# Patient Record
Sex: FEMALE | Race: WHITE | NOT HISPANIC OR LATINO | Employment: FULL TIME | URBAN - METROPOLITAN AREA
[De-identification: names, ages, dates, MRNs, and addresses within clinical notes are randomized per-mention and may not be internally consistent; named-entity substitution may affect disease eponyms.]

---

## 2017-11-12 ENCOUNTER — APPOINTMENT (EMERGENCY)
Dept: CT IMAGING | Facility: HOSPITAL | Age: 52
End: 2017-11-12
Payer: COMMERCIAL

## 2017-11-12 ENCOUNTER — HOSPITAL ENCOUNTER (EMERGENCY)
Facility: HOSPITAL | Age: 52
Discharge: HOME | End: 2017-11-12
Attending: EMERGENCY MEDICINE | Admitting: EMERGENCY MEDICINE
Payer: COMMERCIAL

## 2017-11-12 VITALS
BODY MASS INDEX: 26.34 KG/M2 | HEART RATE: 84 BPM | WEIGHT: 158.07 LBS | RESPIRATION RATE: 17 BRPM | DIASTOLIC BLOOD PRESSURE: 76 MMHG | SYSTOLIC BLOOD PRESSURE: 143 MMHG | HEIGHT: 65 IN | TEMPERATURE: 98.8 F | OXYGEN SATURATION: 100 %

## 2017-11-12 DIAGNOSIS — R42 DIZZINESS: Primary | ICD-10-CM

## 2017-11-12 PROCEDURE — 99284 EMERGENCY DEPT VISIT MOD MDM: CPT

## 2017-11-12 PROCEDURE — 93005 ELECTROCARDIOGRAM TRACING: CPT | Performed by: EMERGENCY MEDICINE

## 2017-11-12 PROCEDURE — 70450 CT HEAD/BRAIN W/O DYE: CPT

## 2017-11-12 RX ORDER — METRONIDAZOLE 500 MG/1
500 TABLET ORAL 2 TIMES DAILY
COMMUNITY
Start: 2017-11-09 | End: 2017-11-16

## 2017-11-12 NOTE — DISCHARGE INSTRUCTIONS
Dizziness   WHAT YOU NEED TO KNOW:   What is dizziness? Dizziness is a feeling of being off balance or unsteady  Common causes of dizziness are an inner ear fluid imbalance or a lack of oxygen in your blood  Dizziness may be acute (lasts 3 days or less) or chronic (lasts longer than 3 days)  You may have dizzy spells that last from seconds to a few hours  What increases my risk for dizziness? Dizziness may get worse during certain activities or when you move a certain way  The following may also increase your risk for dizziness:  · Older age    · An infection, ear surgery, or an inner ear condition, such as Ménière disease    · Stroke, a brain tumor, or a recent head trauma     · An injury that causes a large amount of blood loss    · Heart or blood pressure problems     · Exposure to chemicals, or long-term alcohol use     · Medicines used to treat high blood pressure, seizure disorders, or anxiety and depression     · A nerve disorder, such as multiple sclerosis  What signs and symptoms may happen with dizziness? · A feeling that your surroundings are moving even though you are standing still    · Ringing in your ears or hearing loss     · Feeling faint or lightheaded     · Weakness or unsteadiness     · Double vision or eye movements you cannot control    · Nausea or vomiting     · Confusion  How is the cause of dizziness diagnosed? Your healthcare provider may ask when the dizziness started  Tell the provider if you have dizzy spells, and how long they last  Tell him or her what happens before you become dizzy  The provider will ask if you have other health conditions and if you take any medicines  He or she will check your blood pressure and pulse to see if your dizziness may be related to your heart  Your balance, strength, reflexes, and the way you walk may also be checked   You may need any of the following tests to help find the cause of your dizziness:  · An EKG  records the electrical activity of your heart  An EKG can be used to check for an abnormal heart beat or heart damage  · Blood tests  will check your blood sugar level, infection, and your blood cell levels  · CT or MRI  pictures check for a stroke, head injury, or brain tumor  They also check for brain bleeding or swelling  You may be given contrast liquid to help your brain show up better in the pictures  Tell the healthcare provider if you have ever had an allergic reaction to contrast liquid  Do not enter the MRI room with anything metal  Metal can cause serious injury  Tell the healthcare provider if you have any metal in or on your body  How is dizziness treated? Treatment will depend on the cause of your dizziness  Your healthcare provider may give you oxygen or medicines to decrease your dizziness and nausea  He may also refer you to a specialist  Reanna Bush may need to be admitted to the hospital for treatment  How can I manage my symptoms? · Do not drive  or operate heavy machinery when you are dizzy  · Get up slowly  from sitting or lying down  · Drink plenty of liquids  Liquids help prevent dehydration  Ask how much liquid to drink each day and which liquids are best for you  When should I seek immediate care? · You have a headache and a stiff neck  · You have shaking chills and a fever  · You vomit over and over with no relief  · Your vomit or bowel movements are red or black  · You have pain in your chest, back, or abdomen  · You have numbness, especially in your face, arms, or legs  · You have trouble moving your arms or legs  · You are confused  When should I contact my healthcare provider? · You have a fever  · Your symptoms do not get better with treatment  · You have questions or concerns about your condition or care  CARE AGREEMENT:   You have the right to help plan your care  Learn about your health condition and how it may be treated   Discuss treatment options with your caregivers to decide what care you want to receive  You always have the right to refuse treatment  The above information is an  only  It is not intended as medical advice for individual conditions or treatments  Talk to your doctor, nurse or pharmacist before following any medical regimen to see if it is safe and effective for you  © 2017 [employer] [employer] Information is for End User's use only and may not be sold, redistributed or otherwise used for commercial purposes  All illustrations and images included in CareNotes® are the copyrighted property of [employer]  or Randy Shaunna  Dizziness   WHAT YOU NEED TO KNOW:   Dizziness is a feeling of being off balance or unsteady  Common causes of dizziness are an inner ear fluid imbalance or a lack of oxygen in your blood  Dizziness may be acute (lasts 3 days or less) or chronic (lasts longer than 3 days)  You may have dizzy spells that last from seconds to a few hours  DISCHARGE INSTRUCTIONS:   Return to the emergency department if:   · You have a headache and a stiff neck  · You have shaking chills and a fever  · You vomit over and over with no relief  · Your vomit or bowel movements are red or black  · You have pain in your chest, back, or abdomen  · You have numbness, especially in your face, arms, or legs  · You have trouble moving your arms or legs  · You are confused  Contact your healthcare provider if:   · You have a fever  · Your symptoms do not get better with treatment  · You have questions or concerns about your condition or care  Manage your symptoms:   · Do not drive  or operate heavy machinery when you are dizzy  · Get up slowly  from sitting or lying down  · Drink plenty of liquids  Liquids help prevent dehydration  Ask how much liquid to drink each day and which liquids are best for you    Follow up with your healthcare provider as directed:  Write down your questions so you remember to ask them during your visits  © 2017 [employer] [employer] Information is for End User's use only and may not be sold, redistributed or otherwise used for commercial purposes  All illustrations and images included in CareNotes® are the copyrighted property of [employer]  or Randy Maza  The above information is an  only  It is not intended as medical advice for individual conditions or treatments  Talk to your doctor, nurse or pharmacist before following any medical regimen to see if it is safe and effective for you

## 2017-11-13 LAB
ATRIAL RATE: 71 BPM
P AXIS: 72 DEGREES
PR INTERVAL: 166 MS
QRS AXIS: 6 DEGREES
QRSD INTERVAL: 96 MS
QT INTERVAL: 402 MS
QTC INTERVAL: 436 MS
T WAVE AXIS: 68 DEGREES
VENTRICULAR RATE: 71 BPM

## 2018-11-03 ENCOUNTER — HOSPITAL ENCOUNTER (EMERGENCY)
Facility: HOSPITAL | Age: 53
Discharge: HOME | End: 2018-11-03
Attending: EMERGENCY MEDICINE | Admitting: EMERGENCY MEDICINE
Payer: COMMERCIAL

## 2018-11-03 ENCOUNTER — APPOINTMENT (EMERGENCY)
Dept: CT IMAGING | Facility: HOSPITAL | Age: 53
End: 2018-11-03
Payer: COMMERCIAL

## 2018-11-03 VITALS
DIASTOLIC BLOOD PRESSURE: 82 MMHG | HEIGHT: 65 IN | RESPIRATION RATE: 16 BRPM | OXYGEN SATURATION: 98 % | SYSTOLIC BLOOD PRESSURE: 122 MMHG | HEART RATE: 87 BPM | BODY MASS INDEX: 25.49 KG/M2 | TEMPERATURE: 98.1 F | WEIGHT: 153 LBS

## 2018-11-03 DIAGNOSIS — R55 NEAR SYNCOPE: Primary | ICD-10-CM

## 2018-11-03 DIAGNOSIS — R42 DIZZINESS: ICD-10-CM

## 2018-11-03 DIAGNOSIS — I10 HYPERTENSION: ICD-10-CM

## 2018-11-03 LAB
ALBUMIN SERPL BCP-MCNC: 4.4 G/DL (ref 3.5–5.7)
ALP SERPL-CCNC: 40 U/L (ref 40–150)
ALT SERPL W P-5'-P-CCNC: 15 U/L (ref 7–52)
ANION GAP SERPL CALCULATED.3IONS-SCNC: 9 MMOL/L (ref 4–13)
APTT PPP: 32 SECONDS (ref 24–36)
AST SERPL W P-5'-P-CCNC: 14 U/L (ref 13–39)
ATRIAL RATE: 76 BPM
BASOPHILS # BLD AUTO: 0 THOUSANDS/ΜL (ref 0–0.1)
BASOPHILS NFR BLD AUTO: 1 % (ref 0–2)
BILIRUB SERPL-MCNC: 0.4 MG/DL (ref 0.2–1)
BNP SERPL-MCNC: 21 PG/ML (ref 1–100)
BUN SERPL-MCNC: 16 MG/DL (ref 7–25)
CALCIUM SERPL-MCNC: 9.6 MG/DL (ref 8.6–10.5)
CHLORIDE SERPL-SCNC: 105 MMOL/L (ref 98–107)
CO2 SERPL-SCNC: 25 MMOL/L (ref 21–31)
CREAT SERPL-MCNC: 0.71 MG/DL (ref 0.6–1.2)
EOSINOPHIL # BLD AUTO: 0.1 THOUSAND/ΜL (ref 0–0.61)
EOSINOPHIL NFR BLD AUTO: 3 % (ref 0–5)
ERYTHROCYTE [DISTWIDTH] IN BLOOD BY AUTOMATED COUNT: 13.5 % (ref 11.5–14.5)
GFR SERPL CREATININE-BSD FRML MDRD: 100 ML/MIN/1.73SQ M
GLUCOSE SERPL-MCNC: 102 MG/DL (ref 65–99)
HCT VFR BLD AUTO: 42.5 % (ref 34.8–46.1)
HGB BLD-MCNC: 14.2 G/DL (ref 12–16)
INR PPP: 0.94 (ref 0.9–1.5)
LYMPHOCYTES # BLD AUTO: 0.9 THOUSANDS/ΜL (ref 0.6–4.47)
LYMPHOCYTES NFR BLD AUTO: 20 % (ref 21–51)
MCH RBC QN AUTO: 31 PG (ref 26–34)
MCHC RBC AUTO-ENTMCNC: 33.3 G/DL (ref 31–37)
MCV RBC AUTO: 93 FL (ref 81–99)
MONOCYTES # BLD AUTO: 0.4 THOUSAND/ΜL (ref 0.17–1.22)
MONOCYTES NFR BLD AUTO: 8 % (ref 2–12)
NEUTROPHILS # BLD AUTO: 3 THOUSANDS/ΜL (ref 1.4–6.5)
NEUTS SEG NFR BLD AUTO: 68 % (ref 42–75)
NRBC BLD AUTO-RTO: 0 /100 WBCS
P AXIS: 74 DEGREES
PLATELET # BLD AUTO: 224 THOUSANDS/UL (ref 149–390)
PMV BLD AUTO: 8.8 FL (ref 8.6–11.7)
POTASSIUM SERPL-SCNC: 3.9 MMOL/L (ref 3.5–5.5)
PR INTERVAL: 154 MS
PROT SERPL-MCNC: 6.6 G/DL (ref 6.4–8.9)
PROTHROMBIN TIME: 10.9 SECONDS (ref 10.1–12.9)
QRS AXIS: 0 DEGREES
QRSD INTERVAL: 96 MS
QT INTERVAL: 410 MS
QTC INTERVAL: 461 MS
RBC # BLD AUTO: 4.56 MILLION/UL (ref 3.9–5.2)
SODIUM SERPL-SCNC: 139 MMOL/L (ref 134–143)
T WAVE AXIS: 54 DEGREES
TROPONIN I SERPL-MCNC: <0.03 NG/ML
VENTRICULAR RATE: 76 BPM
WBC # BLD AUTO: 4.4 THOUSAND/UL (ref 4.8–10.8)

## 2018-11-03 PROCEDURE — 85610 PROTHROMBIN TIME: CPT | Performed by: EMERGENCY MEDICINE

## 2018-11-03 PROCEDURE — 85730 THROMBOPLASTIN TIME PARTIAL: CPT | Performed by: EMERGENCY MEDICINE

## 2018-11-03 PROCEDURE — 36415 COLL VENOUS BLD VENIPUNCTURE: CPT | Performed by: EMERGENCY MEDICINE

## 2018-11-03 PROCEDURE — 85025 COMPLETE CBC W/AUTO DIFF WBC: CPT | Performed by: EMERGENCY MEDICINE

## 2018-11-03 PROCEDURE — 93005 ELECTROCARDIOGRAM TRACING: CPT

## 2018-11-03 PROCEDURE — 70450 CT HEAD/BRAIN W/O DYE: CPT

## 2018-11-03 PROCEDURE — 84484 ASSAY OF TROPONIN QUANT: CPT | Performed by: EMERGENCY MEDICINE

## 2018-11-03 PROCEDURE — 96360 HYDRATION IV INFUSION INIT: CPT

## 2018-11-03 PROCEDURE — 93010 ELECTROCARDIOGRAM REPORT: CPT | Performed by: INTERNAL MEDICINE

## 2018-11-03 PROCEDURE — 99284 EMERGENCY DEPT VISIT MOD MDM: CPT

## 2018-11-03 PROCEDURE — 83880 ASSAY OF NATRIURETIC PEPTIDE: CPT | Performed by: EMERGENCY MEDICINE

## 2018-11-03 PROCEDURE — 80053 COMPREHEN METABOLIC PANEL: CPT | Performed by: EMERGENCY MEDICINE

## 2018-11-03 RX ADMIN — SODIUM CHLORIDE 1000 ML: 0.9 INJECTION, SOLUTION INTRAVENOUS at 13:12

## 2018-11-03 NOTE — DISCHARGE INSTRUCTIONS
Dizziness   WHAT YOU NEED TO KNOW:   Dizziness is a feeling of being off balance or unsteady  Common causes of dizziness are an inner ear fluid imbalance or a lack of oxygen in your blood  Dizziness may be acute (lasts 3 days or less) or chronic (lasts longer than 3 days)  You may have dizzy spells that last from seconds to a few hours  DISCHARGE INSTRUCTIONS:   Return to the emergency department if:   · You have a headache and a stiff neck  · You have shaking chills and a fever  · You vomit over and over with no relief  · Your vomit or bowel movements are red or black  · You have pain in your chest, back, or abdomen  · You have numbness, especially in your face, arms, or legs  · You have trouble moving your arms or legs  · You are confused  Contact your healthcare provider if:   · You have a fever  · Your symptoms do not get better with treatment  · You have questions or concerns about your condition or care  Manage your symptoms:   · Do not drive  or operate heavy machinery when you are dizzy  · Get up slowly  from sitting or lying down  · Drink plenty of liquids  Liquids help prevent dehydration  Ask how much liquid to drink each day and which liquids are best for you  Follow up with your healthcare provider as directed:  Write down your questions so you remember to ask them during your visits  © 2017 [address] Information is for End User's use only and may not be sold, redistributed or otherwise used for commercial purposes  All illustrations and images included in CareNotes® are the copyrighted property of [employer]  or Randy Maza  The above information is an  only  It is not intended as medical advice for individual conditions or treatments  Talk to your doctor, nurse or pharmacist before following any medical regimen to see if it is safe and effective for you      Lightheadedness   WHAT YOU NEED TO KNOW: Lightheadedness is the feeling that you may faint, but you do not  Your heartbeat may be fast or feel like it flutters  Lightheadedness may occur when you take certain medicines, such as medicine to lower your blood pressure  Dehydration, low sodium, low blood sugar, an abnormal heart rhythm, and anxiety are other common causes  DISCHARGE INSTRUCTIONS:   Return to the emergency department if:   · You have sudden chest pain  · You have trouble breathing or shortness of breath  · You have vision changes, are sweating, and have nausea while you are sitting or lying down  · You feel flushed and your heart is fluttering  · You faint  Contact your healthcare provider if:   · You feel lightheaded often  · Your heart beats faster or slower than usual      · You have questions or concerns about your condition or care  Follow up with your healthcare provider as directed: You may need more tests to help find the cause of your lightheadedness  The tests will help healthcare providers plan the best treatment for you  Write down your questions so you remember to ask them during your visits  Self-care:  Talk with your healthcare provider about these and other ways to manage your symptoms:  · Lie down  when you feel lightheaded, your throat gets tight, or your vision changes  Raise your legs above the level of your heart  · Stand up slowly  Sit on the side of the bed or couch for a few minutes before you stand up  · Take slow, deep breaths when you feel lightheaded  This can help decrease the feeling that you might faint  · Ask if you need to avoid hot baths and saunas  These may make your symptoms worse  Watch for signs of low blood sugar: These include hunger, nervousness, sweating, and fast or fluttery heartbeats  Talk with your healthcare provider about ways to keep your blood sugar level steady    Check your blood pressure often:  You should do this especially if you take medicine to lower your blood pressure  Check your blood pressure when you are lying down and when you are standing  Ask how often to check during the day  Keep a record of your blood pressure numbers  Your healthcare provider may use the record to help plan your treatment  Keep a record of your lightheadedness episodes:  Include your symptoms and your activity before and after the episode  The record can help your healthcare provider find the cause of your lightheadedness and help you manage episodes  © 2017 [address] [facility] Information is for End User's use only and may not be sold, redistributed or otherwise used for commercial purposes  All illustrations and images included in CareNotes® are the copyrighted property of [employer]  or Randy Maza  The above information is an  only  It is not intended as medical advice for individual conditions or treatments  Talk to your doctor, nurse or pharmacist before following any medical regimen to see if it is safe and effective for you

## 2018-11-03 NOTE — ED NOTES
Pt states she still feels uncomfortably  BP sitting 123/62 pulse88   BP standing 121/76 pulse 78      After short walk /80     Sylvester Nava RN  11/03/18 1314

## 2018-11-03 NOTE — ED PROVIDER NOTES
History  Chief Complaint   Patient presents with    Dizziness     on and off for several months     Patient is a 61-year-old female with prior history of getting dizzy and lightheaded with similar symptoms about a year ago and she reports that this was attributed to a medication reaction from Flagyl at the time she presents to the emergency department now complaining of lightheadedness and near syncope with exertion by her description ongoing for last 2 days she denies any chest pain or trouble breathing  Patient denies any numbness weakness or headaches or change in mental status although she does admit to feeling very cloudy in the head  History provided by:  Patient  Dizziness   Quality:  Lightheadedness  Severity:  Moderate  Onset quality:  Gradual  Duration:  2 days  Timing:  Intermittent  Progression:  Waxing and waning  Chronicity:  New  Context: physical activity and standing up    Context: not with head movement    Relieved by:  Lying down  Worsened by:  Standing up and movement  Associated symptoms: no chest pain, no diarrhea, no headaches, no nausea, no palpitations, no shortness of breath, no vomiting and no weakness    Risk factors: no anemia, no heart disease, no hx of stroke, no hx of vertigo, no Meniere's disease, no multiple medications and no new medications        Prior to Admission Medications   Prescriptions Last Dose Informant Patient Reported? Taking? DIPHENHYDRAMINE HCL PO   Yes No   Sig: Take 50 mg by mouth      Facility-Administered Medications: None       History reviewed  No pertinent past medical history  Past Surgical History:   Procedure Laterality Date    HYSTERECTOMY  1997       History reviewed  No pertinent family history  I have reviewed and agree with the history as documented      Social History   Substance Use Topics    Smoking status: Former Smoker     Types: Cigarettes     Quit date: 1/12/2001    Smokeless tobacco: Never Used    Alcohol use No Review of Systems   Constitutional: Negative for activity change, appetite change, chills, fatigue and fever  HENT: Negative for congestion, ear pain, rhinorrhea and sore throat  Eyes: Negative for discharge, redness and visual disturbance  Respiratory: Negative for cough, chest tightness, shortness of breath and wheezing  Cardiovascular: Negative for chest pain and palpitations  Gastrointestinal: Negative for abdominal pain, constipation, diarrhea, nausea and vomiting  Endocrine: Negative for polydipsia and polyuria  Genitourinary: Negative for difficulty urinating, dysuria, frequency, hematuria and urgency  Musculoskeletal: Negative for arthralgias and myalgias  Skin: Negative for color change, pallor and rash  Neurological: Positive for dizziness  Negative for weakness, light-headedness, numbness and headaches  Hematological: Negative for adenopathy  Does not bruise/bleed easily  All other systems reviewed and are negative  Physical Exam  Physical Exam   Constitutional: She is oriented to person, place, and time  She appears well-developed and well-nourished  HENT:   Head: Normocephalic and atraumatic  Right Ear: External ear normal    Left Ear: External ear normal    Nose: Nose normal    Mouth/Throat: Oropharynx is clear and moist    Eyes: Pupils are equal, round, and reactive to light  Conjunctivae and EOM are normal    Neck: Normal range of motion  Neck supple  Cardiovascular: Normal rate, regular rhythm, normal heart sounds and intact distal pulses  Pulmonary/Chest: Effort normal and breath sounds normal  No respiratory distress  She has no wheezes  She has no rales  She exhibits no tenderness  Abdominal: Soft  Bowel sounds are normal  She exhibits no distension  There is no tenderness  There is no guarding  Musculoskeletal: Normal range of motion  Neurological: She is alert and oriented to person, place, and time  No cranial nerve deficit or sensory deficit  Skin: Skin is warm and dry  Psychiatric: She has a normal mood and affect  Nursing note and vitals reviewed  Vital Signs  ED Triage Vitals [11/03/18 1113]   Temperature Pulse Respirations Blood Pressure SpO2   (!) 96 9 °F (36 1 °C) 96 20 (!) 173/77 99 %      Temp Source Heart Rate Source Patient Position - Orthostatic VS BP Location FiO2 (%)   Temporal -- Lying Left arm --      Pain Score       No Pain           Vitals:    11/03/18 1113   BP: (!) 173/77   Pulse: 96   Patient Position - Orthostatic VS: Lying       Visual Acuity  Visual Acuity      Most Recent Value   L Pupil Size (mm)  4   R Pupil Size (mm)  4          ED Medications  Medications   sodium chloride 0 9 % bolus 1,000 mL (not administered)       Diagnostic Studies  Results Reviewed     Procedure Component Value Units Date/Time    B-Type Natriuretic Peptide [facility] ONLY) [[id number]]  (Normal) Collected:  11/03/18 1136    Lab Status:  Final result Specimen:  Blood from Arm, Left Updated:  11/03/18 1221     BNP 21 pg/mL     Comprehensive metabolic panel [[id number]]  (Abnormal) Collected:  11/03/18 1136    Lab Status:  Final result Specimen:  Blood from Arm, Left Updated:  11/03/18 1215     Sodium 139 mmol/L      Potassium 3 9 mmol/L      Chloride 105 mmol/L      CO2 25 mmol/L      ANION GAP 9 mmol/L      BUN 16 mg/dL      Creatinine 0 71 mg/dL      Glucose 102 (H) mg/dL      Calcium 9 6 mg/dL      AST 14 U/L      ALT 15 U/L      Alkaline Phosphatase 40 U/L      Total Protein 6 6 g/dL      Albumin 4 4 g/dL      Total Bilirubin 0 40 mg/dL      eGFR 100 ml/min/1 73sq m     Narrative:         National Kidney Disease Education Program recommendations are as follows:  GFR calculation is accurate only with a steady state creatinine  Chronic Kidney disease less than 60 ml/min/1 73 sq  meters  Kidney failure less than 15 ml/min/1 73 sq  meters      Troponin I [[id number]]  (Normal) Collected:  11/03/18 1136    Lab Status:  Final result Specimen: Blood from Arm, Left Updated:  11/03/18 1215     Troponin I <0 03 ng/mL     Protime-INR [[id number]]  (Normal) Collected:  11/03/18 1136    Lab Status:  Final result Specimen:  Blood from Arm, Left Updated:  11/03/18 1159     Protime 10 9 seconds      INR 0 94    APTT [[id number]]  (Normal) Collected:  11/03/18 1136    Lab Status:  Final result Specimen:  Blood from Arm, Left Updated:  11/03/18 1159     PTT 32 seconds     CBC and differential [[id number]]  (Abnormal) Collected:  11/03/18 1136    Lab Status:  Final result Specimen:  Blood from Arm, Left Updated:  11/03/18 1145     WBC 4 40 (L) Thousand/uL      RBC 4 56 Million/uL      Hemoglobin 14 2 g/dL      Hematocrit 42 5 %      MCV 93 fL      MCH 31 0 pg      MCHC 33 3 g/dL      RDW 13 5 %      MPV 8 8 fL      Platelets 151 Thousands/uL      nRBC 0 /100 WBCs      Neutrophils Relative 68 %      Lymphocytes Relative 20 (L) %      Monocytes Relative 8 %      Eosinophils Relative 3 %      Basophils Relative 1 %      Neutrophils Absolute 3 00 Thousands/µL      Lymphocytes Absolute 0 90 Thousands/µL      Monocytes Absolute 0 40 Thousand/µL      Eosinophils Absolute 0 10 Thousand/µL      Basophils Absolute 0 00 Thousands/µL                  CT head without contrast   Final Result by Beata Pop MD (11/03 1232)      1  There are no findings of intra or extra-axial hemorrhage, midline   shift or herniation  2   The MRI of the brain would be more sensitive examination and can be   considered for further evaluation with patient history of dizziness           Signed by Beata Pop                 Procedures  ECG 12 Lead Documentation  Date/Time: 11/3/2018 11:35 AM  Performed by: Liz Young  Authorized by: Liz Young     ECG reviewed by me, the ED Provider: yes    Patient location:  ED  Previous ECG:     Comparison to cardiac monitor: Yes    Quality:     Tracing quality:  Limited by artifact  Rate:     ECG rate:  76    ECG rate assessment: normal    Rhythm: Rhythm: sinus rhythm    QRS:     QRS axis:  Left  ST segments:     ST segments:  Non-specific  T waves:     T waves: non-specific             Phone Contacts  ED Phone Contact    ED Course                               MDM  Number of Diagnoses or Management Options  Dizziness: new and requires workup  Hypertension: new and requires workup  Near syncope: new and requires workup  Diagnosis management comments: Patient remained stable in the emergency department felt improved and well after IV fluids administered she states that her blood pressure was normal at her last physical she is advised of elevated blood pressure noted in the emergency department today and recommended follow-up with primary care physician promptly for further evaluation re-evaluation and obtain test results patient is neurologically intact with normal nonfocal neurologic examination in the ED workup in ED is unremarkable at this point she remains clinically stable for discharge in return home and follow up with family doctor return precautions and anticipatory guidance discussed           Amount and/or Complexity of Data Reviewed  Clinical lab tests: ordered and reviewed  Tests in the radiology section of CPT®: ordered and reviewed  Tests in the medicine section of CPT®: ordered and reviewed  Independent visualization of images, tracings, or specimens: yes    Risk of Complications, Morbidity, and/or Mortality  Presenting problems: moderate  Management options: moderate    Patient Progress  Patient progress: stable    CritCare Time    Disposition  Final diagnoses:   Near syncope   Dizziness   Hypertension     Time reflects when diagnosis was documented in both MDM as applicable and the Disposition within this note     Time User Action Codes Description Comment    11/3/2018 12:27 PM Vj Burton Add [R55] Near syncope     11/3/2018 12:27 PM Vj Burton Add [R42] Dizziness     11/3/2018 12:27 PM Vj Burton Add [I10] Hypertension       ED Disposition     ED Disposition Condition Comment    Discharge  Ton Castro discharge to home/self care  Condition at discharge: Stable        Follow-up Information     Follow up With Specialties Details Why Contact Info    Lewis Rene DO Family Medicine Schedule an appointment as soon as possible for a visit in 2 days  [address] [place] [address],[address]  [address]  [facility] [address]  [phone]            Patient's Medications   Discharge Prescriptions    No medications on file     No discharge procedures on file      ED Provider  Electronically Signed by           Mariposa Kay DO  11/03/18 3440

## 2018-11-03 NOTE — ED NOTES
Pt states she is feeling better after the fluids  Pt states she is on a diet and at times doesn't eat enough at times and typically doesn't drink water  Spoke with pt re: dietary needs and supplements  Pt in agreement and verbalizes understanding of the same        Romayne Arnold, RN  11/03/18 4572

## 2019-01-30 ENCOUNTER — APPOINTMENT (EMERGENCY)
Dept: RADIOLOGY | Facility: HOSPITAL | Age: 54
End: 2019-01-30
Payer: COMMERCIAL

## 2019-01-30 ENCOUNTER — APPOINTMENT (EMERGENCY)
Dept: CT IMAGING | Facility: HOSPITAL | Age: 54
End: 2019-01-30
Payer: COMMERCIAL

## 2019-01-30 ENCOUNTER — HOSPITAL ENCOUNTER (EMERGENCY)
Facility: HOSPITAL | Age: 54
Discharge: HOME | End: 2019-01-30
Attending: EMERGENCY MEDICINE | Admitting: EMERGENCY MEDICINE
Payer: COMMERCIAL

## 2019-01-30 VITALS
DIASTOLIC BLOOD PRESSURE: 104 MMHG | HEART RATE: 90 BPM | OXYGEN SATURATION: 100 % | TEMPERATURE: 98.3 F | RESPIRATION RATE: 18 BRPM | SYSTOLIC BLOOD PRESSURE: 139 MMHG

## 2019-01-30 DIAGNOSIS — R42 DIZZINESS: Primary | ICD-10-CM

## 2019-01-30 DIAGNOSIS — J01.90 ACUTE SINUSITIS: ICD-10-CM

## 2019-01-30 LAB
ALBUMIN SERPL BCP-MCNC: 4.2 G/DL (ref 3.5–5)
ALP SERPL-CCNC: 56 U/L (ref 46–116)
ALT SERPL W P-5'-P-CCNC: 29 U/L (ref 12–78)
AMPHETAMINES SERPL QL SCN: NEGATIVE
ANION GAP SERPL CALCULATED.3IONS-SCNC: 12 MMOL/L (ref 4–13)
APTT PPP: 25 SECONDS (ref 26–38)
AST SERPL W P-5'-P-CCNC: 11 U/L (ref 5–45)
BARBITURATES UR QL: NEGATIVE
BASOPHILS # BLD AUTO: 0.06 THOUSANDS/ΜL (ref 0–0.1)
BASOPHILS NFR BLD AUTO: 1 % (ref 0–1)
BENZODIAZ UR QL: NEGATIVE
BILIRUB SERPL-MCNC: 0.4 MG/DL (ref 0.2–1)
BILIRUB UR QL STRIP: NEGATIVE
BUN SERPL-MCNC: 24 MG/DL (ref 5–25)
CALCIUM SERPL-MCNC: 9.4 MG/DL (ref 8.3–10.1)
CHLORIDE SERPL-SCNC: 103 MMOL/L (ref 100–108)
CLARITY UR: CLEAR
CO2 SERPL-SCNC: 27 MMOL/L (ref 21–32)
COCAINE UR QL: NEGATIVE
COLOR UR: YELLOW
CREAT SERPL-MCNC: 0.82 MG/DL (ref 0.6–1.3)
EOSINOPHIL # BLD AUTO: 0.2 THOUSAND/ΜL (ref 0–0.61)
EOSINOPHIL NFR BLD AUTO: 3 % (ref 0–6)
ERYTHROCYTE [DISTWIDTH] IN BLOOD BY AUTOMATED COUNT: 12.8 % (ref 11.6–15.1)
GFR SERPL CREATININE-BSD FRML MDRD: 83 ML/MIN/1.73SQ M
GLUCOSE SERPL-MCNC: 100 MG/DL (ref 65–140)
GLUCOSE UR STRIP-MCNC: NEGATIVE MG/DL
HCT VFR BLD AUTO: 44.3 % (ref 34.8–46.1)
HGB BLD-MCNC: 14.4 G/DL (ref 11.5–15.4)
HGB UR QL STRIP.AUTO: NEGATIVE
IMM GRANULOCYTES # BLD AUTO: 0.02 THOUSAND/UL (ref 0–0.2)
IMM GRANULOCYTES NFR BLD AUTO: 0 % (ref 0–2)
INR PPP: 0.88 (ref 0.86–1.17)
KETONES UR STRIP-MCNC: ABNORMAL MG/DL
LEUKOCYTE ESTERASE UR QL STRIP: NEGATIVE
LYMPHOCYTES # BLD AUTO: 2.1 THOUSANDS/ΜL (ref 0.6–4.47)
LYMPHOCYTES NFR BLD AUTO: 33 % (ref 14–44)
MAGNESIUM SERPL-MCNC: 1.9 MG/DL (ref 1.6–2.6)
MCH RBC QN AUTO: 30.7 PG (ref 26.8–34.3)
MCHC RBC AUTO-ENTMCNC: 32.5 G/DL (ref 31.4–37.4)
MCV RBC AUTO: 95 FL (ref 82–98)
METHADONE UR QL: NEGATIVE
MONOCYTES # BLD AUTO: 0.53 THOUSAND/ΜL (ref 0.17–1.22)
MONOCYTES NFR BLD AUTO: 8 % (ref 4–12)
NEUTROPHILS # BLD AUTO: 3.49 THOUSANDS/ΜL (ref 1.85–7.62)
NEUTS SEG NFR BLD AUTO: 55 % (ref 43–75)
NITRITE UR QL STRIP: NEGATIVE
NRBC BLD AUTO-RTO: 0 /100 WBCS
NT-PROBNP SERPL-MCNC: 23 PG/ML
OPIATES UR QL SCN: NEGATIVE
PCP UR QL: NEGATIVE
PH UR STRIP.AUTO: 5.5 [PH] (ref 4.5–8)
PLATELET # BLD AUTO: 296 THOUSANDS/UL (ref 149–390)
PMV BLD AUTO: 10.4 FL (ref 8.9–12.7)
POTASSIUM SERPL-SCNC: 3.8 MMOL/L (ref 3.5–5.3)
PROT SERPL-MCNC: 7.3 G/DL (ref 6.4–8.2)
PROT UR STRIP-MCNC: NEGATIVE MG/DL
PROTHROMBIN TIME: 11.7 SECONDS (ref 11.8–14.2)
RBC # BLD AUTO: 4.69 MILLION/UL (ref 3.81–5.12)
SODIUM SERPL-SCNC: 142 MMOL/L (ref 136–145)
SP GR UR STRIP.AUTO: <=1.005 (ref 1–1.03)
THC UR QL: NEGATIVE
TROPONIN I SERPL-MCNC: <0.02 NG/ML
TSH SERPL DL<=0.05 MIU/L-ACNC: 2.23 UIU/ML (ref 0.36–3.74)
UROBILINOGEN UR QL STRIP.AUTO: 0.2 E.U./DL
WBC # BLD AUTO: 6.4 THOUSAND/UL (ref 4.31–10.16)

## 2019-01-30 PROCEDURE — 93005 ELECTROCARDIOGRAM TRACING: CPT

## 2019-01-30 PROCEDURE — 81003 URINALYSIS AUTO W/O SCOPE: CPT

## 2019-01-30 PROCEDURE — 70450 CT HEAD/BRAIN W/O DYE: CPT

## 2019-01-30 PROCEDURE — 85025 COMPLETE CBC W/AUTO DIFF WBC: CPT | Performed by: EMERGENCY MEDICINE

## 2019-01-30 PROCEDURE — 99285 EMERGENCY DEPT VISIT HI MDM: CPT

## 2019-01-30 PROCEDURE — 83735 ASSAY OF MAGNESIUM: CPT | Performed by: EMERGENCY MEDICINE

## 2019-01-30 PROCEDURE — 80053 COMPREHEN METABOLIC PANEL: CPT | Performed by: EMERGENCY MEDICINE

## 2019-01-30 PROCEDURE — 85610 PROTHROMBIN TIME: CPT | Performed by: EMERGENCY MEDICINE

## 2019-01-30 PROCEDURE — 80307 DRUG TEST PRSMV CHEM ANLYZR: CPT | Performed by: EMERGENCY MEDICINE

## 2019-01-30 PROCEDURE — 71045 X-RAY EXAM CHEST 1 VIEW: CPT

## 2019-01-30 PROCEDURE — 84443 ASSAY THYROID STIM HORMONE: CPT | Performed by: EMERGENCY MEDICINE

## 2019-01-30 PROCEDURE — 83880 ASSAY OF NATRIURETIC PEPTIDE: CPT | Performed by: EMERGENCY MEDICINE

## 2019-01-30 PROCEDURE — 36415 COLL VENOUS BLD VENIPUNCTURE: CPT | Performed by: EMERGENCY MEDICINE

## 2019-01-30 PROCEDURE — 84484 ASSAY OF TROPONIN QUANT: CPT | Performed by: EMERGENCY MEDICINE

## 2019-01-30 PROCEDURE — 85730 THROMBOPLASTIN TIME PARTIAL: CPT | Performed by: EMERGENCY MEDICINE

## 2019-01-30 RX ORDER — FLUTICASONE PROPIONATE 50 MCG
1 SPRAY, SUSPENSION (ML) NASAL DAILY
Qty: 16 G | Refills: 0 | Status: SHIPPED | OUTPATIENT
Start: 2019-01-30 | End: 2021-12-22

## 2019-01-30 RX ORDER — MECLIZINE HYDROCHLORIDE 25 MG/1
25 TABLET ORAL 3 TIMES DAILY PRN
Qty: 30 TABLET | Refills: 0 | Status: SHIPPED | OUTPATIENT
Start: 2019-01-30 | End: 2019-06-25

## 2019-01-30 NOTE — ED PROVIDER NOTES
History  Chief Complaint   Patient presents with    Altered Mental Status     pt was seen for dizziness 2 months ago in the [facility] ER  SInce then states has not had any improvement of symptoms  Continues to complain of dizziness and confusion  53-year-old female comes in for evaluation of dizziness  Patient states that approximately 2 months ago she had a similar problem was seen at [facility] emergency department she had blood work and a CT scan that were deemed normal   Patient was told to follow up but never did up until yesterday, where she went to go see her family doctor who saw and examined her and reviewed those findings and have was going to have her follow up with a neurologist   Patient comes in very anxious and upset that something is wrong and no one is finding it"  Patient's exam is benign she has no focal neurological deficits patient's only complaint is of a posterior headache no nausea no vomiting no fever no chills no chest pain no shortness of breath no abdominal pain no dysuria no hematuria no constipation  Patient's only complaint is of a posterior headache and intermittent blurry vision states that her vision is normal right now          History provided by:  Patient   used: No    Dizziness   Quality:  Lightheadedness  Severity:  Moderate  Onset quality:  Gradual  Timing:  Intermittent  Progression:  Waxing and waning  Chronicity:  New  Context: not with ear pain and not with loss of consciousness    Ineffective treatments:  None tried  Associated symptoms: vision changes    Associated symptoms: no blood in stool, no chest pain, no diarrhea, no headaches, no hearing loss, no nausea, no palpitations, no shortness of breath, no syncope, no tinnitus, no vomiting and no weakness    Risk factors: no anemia, no Meniere's disease, no multiple medications and no new medications        Prior to Admission Medications   Prescriptions Last Dose Informant Patient Reported? Taking? DIPHENHYDRAMINE HCL PO   Yes No   Sig: Take 50 mg by mouth      Facility-Administered Medications: None       History reviewed  No pertinent past medical history  Past Surgical History:   Procedure Laterality Date    HYSTERECTOMY  1997       History reviewed  No pertinent family history  I have reviewed and agree with the history as documented  Social History   Substance Use Topics    Smoking status: Former Smoker     Types: Cigarettes     Quit date: 1/12/2001    Smokeless tobacco: Never Used    Alcohol use No        Review of Systems   Constitutional: Negative for fatigue and fever  HENT: Negative for congestion, ear pain, hearing loss and tinnitus  Eyes: Negative for discharge and redness  Respiratory: Negative for apnea, cough, shortness of breath and wheezing  Cardiovascular: Negative for chest pain, palpitations and syncope  Gastrointestinal: Negative for abdominal pain, blood in stool, diarrhea, nausea and vomiting  Endocrine: Negative for cold intolerance and polydipsia  Genitourinary: Negative for difficulty urinating and hematuria  Musculoskeletal: Negative for arthralgias and back pain  Skin: Negative for color change and rash  Allergic/Immunologic: Negative for environmental allergies and immunocompromised state  Neurological: Positive for dizziness  Negative for weakness, numbness and headaches  Hematological: Negative for adenopathy  Does not bruise/bleed easily  Psychiatric/Behavioral: Negative for agitation and behavioral problems  Physical Exam  Physical Exam   Constitutional: She is oriented to person, place, and time  Vital signs are normal  She appears well-developed and well-nourished  Non-toxic appearance  HENT:   Head: Normocephalic and atraumatic  Right Ear: Tympanic membrane and external ear normal    Left Ear: Tympanic membrane and external ear normal    Nose: Mucosal edema and rhinorrhea present   No sinus tenderness or nasal deformity  Mouth/Throat: Uvula is midline and oropharynx is clear and moist  Normal dentition  Eyes: Pupils are equal, round, and reactive to light  Conjunctivae, EOM and lids are normal  Right eye exhibits no discharge  Left eye exhibits no discharge  Neck: Trachea normal and normal range of motion  Neck supple  No JVD present  Carotid bruit is not present  Cardiovascular: Normal rate, regular rhythm, intact distal pulses and normal pulses  No extrasystoles are present  PMI is not displaced  Pulmonary/Chest: Effort normal and breath sounds normal  No accessory muscle usage  No respiratory distress  She has no wheezes  She has no rhonchi  She has no rales  Abdominal: Soft  Normal appearance and bowel sounds are normal  She exhibits no mass  There is no tenderness  There is no rigidity, no rebound and no guarding  Musculoskeletal:        Right shoulder: She exhibits normal range of motion, no bony tenderness, no swelling and no deformity  Cervical back: Normal  She exhibits normal range of motion, no tenderness, no bony tenderness and no deformity  Lymphadenopathy:     She has no cervical adenopathy  She has no axillary adenopathy  Neurological: She is alert and oriented to person, place, and time  She has normal strength and normal reflexes  No cranial nerve deficit or sensory deficit  GCS eye subscore is 4  GCS verbal subscore is 5  GCS motor subscore is 6  Skin: Skin is warm and dry  No rash noted  Psychiatric: She has a normal mood and affect  Her speech is normal and behavior is normal    Nursing note and vitals reviewed        Vital Signs  ED Triage Vitals [01/30/19 1825]   Temperature Pulse Respirations Blood Pressure SpO2   98 3 °F (36 8 °C) 90 18 (!) 139/104 100 %      Temp Source Heart Rate Source Patient Position - Orthostatic VS BP Location FiO2 (%)   Oral Monitor Sitting Right arm --      Pain Score       No Pain           Vitals:    01/30/19 1825   BP: (!) 139/104   Pulse: 90   Patient Position - Orthostatic VS: Sitting       Visual Acuity      ED Medications  Medications - No data to display    Diagnostic Studies  Results Reviewed     Procedure Component Value Units Date/Time    Troponin I [[id number]]  (Normal) Collected:  01/30/19 1848    Lab Status:  Final result Specimen:  Blood from Arm, Left Updated:  01/30/19 1936     Troponin I <0 02 ng/mL     Protime-INR [[id number]]  (Abnormal) Collected:  01/30/19 1848    Lab Status:  Final result Specimen:  Blood from Arm, Left Updated:  01/30/19 1922     Protime 11 7 (L) seconds      INR 0 88    APTT [[id number]]  (Abnormal) Collected:  01/30/19 1848    Lab Status:  Final result Specimen:  Blood from Arm, Left Updated:  01/30/19 1922     PTT 25 (L) seconds     TSH, 3rd generation [[id number]]  (Normal) Collected:  01/30/19 1848    Lab Status:  Final result Specimen:  Blood from Arm, Left Updated:  01/30/19 1921     TSH 3RD GENERATON 2 226 uIU/mL     Narrative:         Patients undergoing fluorescein dye angiography may retain small amounts of fluorescein in the body for 48-72 hours post procedure  Samples containing fluorescein can produce falsely depressed TSH values  If the patient had this procedure,a specimen should be resubmitted post fluorescein clearance            The recommended reference ranges for TSH during pregnancy are as follows:  First trimester 0 1 to 2 5 uIU/mL  Second trimester  0 2 to 3 0 uIU/mL  Third trimester 0 3 to 3 0 uIU/m      Magnesium [[id number]]  (Normal) Collected:  01/30/19 1848    Lab Status:  Final result Specimen:  Blood from Arm, Left Updated:  01/30/19 1921     Magnesium 1 9 mg/dL     B-type natriuretic peptide [[id number]]  (Normal) Collected:  01/30/19 1848    Lab Status:  Final result Specimen:  Blood from Arm, Left Updated:  01/30/19 1921     NT-proBNP 23 pg/mL     Comprehensive metabolic panel [[id number]] Collected:  01/30/19 1848    Lab Status:  Final result Specimen:  Blood from Arm, Left Updated:  01/30/19 1911     Sodium 142 mmol/L      Potassium 3 8 mmol/L      Chloride 103 mmol/L      CO2 27 mmol/L      ANION GAP 12 mmol/L      BUN 24 mg/dL      Creatinine 0 82 mg/dL      Glucose 100 mg/dL      Calcium 9 4 mg/dL      AST 11 U/L      ALT 29 U/L      Alkaline Phosphatase 56 U/L      Total Protein 7 3 g/dL      Albumin 4 2 g/dL      Total Bilirubin 0 40 mg/dL      eGFR 83 ml/min/1 73sq m     Narrative:         National Kidney Disease Education Program recommendations are as follows:  GFR calculation is accurate only with a steady state creatinine  Chronic Kidney disease less than 60 ml/min/1 73 sq  meters  Kidney failure less than 15 ml/min/1 73 sq  meters  Rapid drug screen, urine [[id number]]  (Normal) Collected:  01/30/19 1848    Lab Status:  Final result Specimen:  Urine from Urine, Clean Catch Updated:  01/30/19 1906     Amph/Meth UR Negative     Barbiturate Ur Negative     Benzodiazepine Urine Negative     Cocaine Urine Negative     Methadone Urine Negative     Opiate Urine Negative     PCP Ur Negative     THC Urine Negative    Narrative:         FOR MEDICAL PURPOSES ONLY  IF CONFIRMATION NEEDED PLEASE CONTACT THE LAB WITHIN 5 DAYS      Drug Screen Cutoff Levels:  AMPHETAMINE/METHAMPHETAMINES  1000 ng/mL  BARBITURATES     200 ng/mL  BENZODIAZEPINES     200 ng/mL  COCAINE      300 ng/mL  METHADONE      300 ng/mL  OPIATES      300 ng/mL  PHENCYCLIDINE     25 ng/mL  THC       50 ng/mL    CBC and differential [[id number]] Collected:  01/30/19 1848    Lab Status:  Final result Specimen:  Blood from Arm, Left Updated:  01/30/19 1854     WBC 6 40 Thousand/uL      RBC 4 69 Million/uL      Hemoglobin 14 4 g/dL      Hematocrit 44 3 %      MCV 95 fL      MCH 30 7 pg      MCHC 32 5 g/dL      RDW 12 8 %      MPV 10 4 fL      Platelets 721 Thousands/uL      nRBC 0 /100 WBCs      Neutrophils Relative 55 %      Immat GRANS % 0 %      Lymphocytes Relative 33 %      Monocytes Relative 8 %      Eosinophils Relative 3 %      Basophils Relative 1 %      Neutrophils Absolute 3 49 Thousands/µL      Immature Grans Absolute 0 02 Thousand/uL      Lymphocytes Absolute 2 10 Thousands/µL      Monocytes Absolute 0 53 Thousand/µL      Eosinophils Absolute 0 20 Thousand/µL      Basophils Absolute 0 06 Thousands/µL     ED Urine Macroscopic [[id number]]  (Abnormal) Collected:  01/30/19 1852    Lab Status:  Final result Specimen:  Urine Updated:  01/30/19 1851     Color, UA Yellow     Clarity, UA Clear     pH, UA 5 5     Leukocytes, UA Negative     Nitrite, UA Negative     Protein, UA Negative mg/dl      Glucose, UA Negative mg/dl      Ketones, UA 15 (1+) (A) mg/dl      Urobilinogen, UA 0 2 E U /dl      Bilirubin, UA Negative     Blood, UA Negative     Specific Gravity, UA <=1 005    Narrative:       CLINITEK RESULT                 CT head without contrast   Final Result by Graciela Pritchard MD (01/30 1928)      No acute intracranial abnormality  Acute on chronic left maxillary sinusitis              Workstation performed: [id number]         XR chest 1 view portable    (Results Pending)              Procedures  ECG 12 Lead Documentation  Date/Time: 1/30/2019 6:46 PM  Performed by: Thomasina Blizzard by: Sherie LEA     Patient location:  ED  Rate:     ECG rate:  71  Rhythm:     Rhythm: sinus rhythm    Ectopy:     Ectopy: none    QRS:     QRS axis:  Normal  Conduction:     Conduction: normal    ST segments:     ST segments:  Normal  T waves:     T waves: normal             Phone Contacts  ED Phone Contact    ED Course                               MDM  Number of Diagnoses or Management Options  Acute sinusitis: new and requires workup  Dizziness: new and requires workup     Amount and/or Complexity of Data Reviewed  Clinical lab tests: ordered and reviewed  Tests in the radiology section of CPT®: ordered and reviewed  Tests in the medicine section of CPT®: ordered and reviewed  Review and summarize past medical records: yes    Patient Progress  Patient progress: stable      Disposition  Final diagnoses:   Dizziness   Acute sinusitis     Time reflects when diagnosis was documented in both MDM as applicable and the Disposition within this note     Time User Action Codes Description Comment    1/30/2019  7:45 PM Deepthi Ford Add [R42] Dizziness     1/30/2019  7:46 PM Deepthi Ford Add [J01 90] Acute sinusitis       ED Disposition     ED Disposition Condition Date/Time Comment    Discharge  Wed Jan 30, 2019  7:46 PM Too Martinez discharge to home/self care  Condition at discharge: Good        Follow-up Information     Follow up With Specialties Details Why Contact Info    Charlie Valdes DO Family Medicine Schedule an appointment as soon as possible for a visit  [id number] [place] [address],[address]  [address]  [facility] [address]  [address] [place] [address],[address] Ent Otolaryngology Schedule an appointment as soon as possible for a visit  [address] [place] [address] 3  [phone]            Patient's Medications   Discharge Prescriptions    FLUTICASONE (FLONASE) 50 MCG/ACT NASAL SPRAY    1 spray into each nostril daily       Start Date: 1/30/2019 End Date: --       Order Dose: 1 spray       Quantity: 16 g    Refills: 0    MECLIZINE (ANTIVERT) 25 MG TABLET    Take 1 tablet (25 mg total) by mouth 3 (three) times a day as needed for dizziness       Start Date: 1/30/2019 End Date: --       Order Dose: 25 mg       Quantity: 30 tablet    Refills: 0     No discharge procedures on file      ED Provider  Electronically Signed by           Waqar Jhaveri DO  01/30/19 1948

## 2019-01-31 NOTE — DISCHARGE INSTRUCTIONS
Dizziness, Ambulatory Care   GENERAL INFORMATION:   Dizziness  is a term used to describe a feeling of being off balance or unsteady  Common causes of dizziness are an inner ear fluid imbalance or a lack of oxygen in your blood  Your dizziness may be acute (lasts 3 days or less) or chronic (lasts longer than 3 days)  You may have dizzy spells that last from seconds to a few hours  Common symptoms related to dizziness include the following:   · A feeling that your surroundings are moving even though you are standing still    · Ringing in your ears or hearing loss     · Feeling faint or lightheaded     · Weakness or unsteadiness     · Double vision or eye movements you cannot control    · Nausea or vomiting     · Confusion  Seek immediate care for the following symptoms:   · You have a headache that does not go away with medicine  · You have shaking chills and a fever  · You vomit over and over with no relief  · Your vomit or bowel movements are red or black  · You have pain in your chest, back, or abdomen  · You have numbness, especially in your face, arms, or legs  · You have trouble moving your arms or legs  Treatment for dizziness  depends on the cause of your dizziness  You may need medicines to decrease your dizziness or nausea  You may need to be admitted to the hospital for treatment  Manage your symptoms:  Get up slowly from sitting or lying down  Do not drive or operate machinery when you are dizzy  Follow up with your healthcare provider as directed:  Write down your questions so you remember to ask them during your visits  CARE AGREEMENT:   You have the right to help plan your care  Learn about your health condition and how it may be treated  Discuss treatment options with your caregivers to decide what care you want to receive  You always have the right to refuse treatment  The above information is an  only   It is not intended as medical advice for individual conditions or treatments  Talk to your doctor, nurse or pharmacist before following any medical regimen to see if it is safe and effective for you  © 2014 [address] is for End User's use only and may not be sold, redistributed or otherwise used for commercial purposes  All illustrations and images included in CareNotes® are the copyrighted property of A "[employer] A M , Inc  or Randy Maza  Sinusitis, Ambulatory Care   GENERAL INFORMATION:   Sinusitis  is inflammation or infection of your sinuses  It is most often caused by a virus  Acute sinusitis may last up to 12 weeks  Chronic sinusitis lasts longer than 12 weeks  Recurrent sinusitis is when you have 3 or more episodes of sinusitis in 1 year  Common symptoms include the following:   · Fever    · Pain, pressure, redness, or swelling around the forehead, cheeks, or eyes    · Thick yellow or green discharge from your nose    · Tenderness when you touch your face over your sinuses    · Dry cough that happens mostly at night or when you lie down    · Headache and face pain that is worse when you lean forward    · Teeth pain or pain when you chew  Seek immediate care for the following symptoms:   · Vision changes such as double vision    · Confusion or trouble thinking clearly    · Headache and stiff neck    · Trouble breathing  Treatment for sinusitis  may include medicines to relieve nasal and sinus congestion or to decrease pain and fever  Ask your healthcare provider which medicines you should take and how much is safe  Manage sinusitis:   · Drink liquids as directed  Ask your healthcare provider how much liquid to drink each day and which liquids are best for you  Liquids will help loosen and drain the mucus in your sinuses  · Breathe in steam   Heat a bowl of water until you see steam  Lean over the bowl and make a tent over your head with a large towel  Breathe deeply for about 20 minutes   Be careful not to get too close to the steam or burn yourself  Do this 3 times a day  You can also breathe deeply when you take a hot shower  · Rinse your sinuses  Use a sinus rinse device to rinse your nasal passages with a saline (salt water) solution  This will help thin the mucus in your nose and rinse away pollen and dirt  It will also help reduce swelling so you can breathe normally  Ask how often to do this  · Use heat on your sinuses  to decrease pain  Apply heat for 15 to 20 minutes every hour for as many days as directed  · Sleep with your head elevated  Place an extra pillow under your head before you go to sleep to help your sinuses drain  · Do not smoke and avoid secondhand smoke  If you smoke, it is never too late to quit  Ask for information about how to stop smoking if you need help  Prevent the spread of germs that cause sinusitis:  Wash your hands often with soap and water  Wash your hands after you use the bathroom, change a child's diaper, or sneeze  Wash your hands before you prepare or eat food  Follow up with your healthcare provider as directed:  Write down your questions so you remember to ask them during your visits  CARE AGREEMENT:   You have the right to help plan your care  Learn about your health condition and how it may be treated  Discuss treatment options with your caregivers to decide what care you want to receive  You always have the right to refuse treatment  The above information is an  only  It is not intended as medical advice for individual conditions or treatments  Talk to your doctor, nurse or pharmacist before following any medical regimen to see if it is safe and effective for you  © 2014 [address] is for End User's use only and may not be sold, redistributed or otherwise used for commercial purposes  All illustrations and images included in CareNotes® are the copyrighted property of A D A [employer]  or Randy Maza

## 2019-01-31 NOTE — ED NOTES
Discharge instruction given to patient  No questions or concerns at this time  Patient able to ambulate out without difficulty  Medication and follow-up information provided        Claudeen Russell, RN  01/30/19 2006

## 2019-02-01 LAB
ATRIAL RATE: 71 BPM
P AXIS: 28 DEGREES
PR INTERVAL: 154 MS
QRS AXIS: -6 DEGREES
QRSD INTERVAL: 98 MS
QT INTERVAL: 424 MS
QTC INTERVAL: 460 MS
T WAVE AXIS: 15 DEGREES
VENTRICULAR RATE: 71 BPM

## 2019-02-01 PROCEDURE — 93010 ELECTROCARDIOGRAM REPORT: CPT | Performed by: INTERNAL MEDICINE

## 2019-06-25 ENCOUNTER — HOSPITAL ENCOUNTER (EMERGENCY)
Facility: HOSPITAL | Age: 54
Discharge: HOME | End: 2019-06-25
Attending: EMERGENCY MEDICINE | Admitting: EMERGENCY MEDICINE
Payer: COMMERCIAL

## 2019-06-25 ENCOUNTER — APPOINTMENT (EMERGENCY)
Dept: NON INVASIVE DIAGNOSTICS | Facility: HOSPITAL | Age: 54
End: 2019-06-25
Payer: COMMERCIAL

## 2019-06-25 VITALS
BODY MASS INDEX: 26.16 KG/M2 | OXYGEN SATURATION: 100 % | HEIGHT: 65 IN | TEMPERATURE: 96.8 F | DIASTOLIC BLOOD PRESSURE: 66 MMHG | SYSTOLIC BLOOD PRESSURE: 131 MMHG | HEART RATE: 72 BPM | WEIGHT: 157 LBS | RESPIRATION RATE: 18 BRPM

## 2019-06-25 DIAGNOSIS — I10 HYPERTENSION, UNSPECIFIED TYPE: Primary | ICD-10-CM

## 2019-06-25 DIAGNOSIS — I80.9 SUPERFICIAL THROMBOPHLEBITIS, INVOLVING UNSPECIFIED SITE: ICD-10-CM

## 2019-06-25 LAB
ALBUMIN SERPL BCP-MCNC: 4.4 G/DL (ref 3.5–5.7)
ALP SERPL-CCNC: 42 U/L (ref 40–150)
ALT SERPL W P-5'-P-CCNC: 20 U/L (ref 7–52)
ANION GAP SERPL CALCULATED.3IONS-SCNC: 8 MMOL/L (ref 4–13)
APTT PPP: 32 SECONDS (ref 23–37)
AST SERPL W P-5'-P-CCNC: 16 U/L (ref 13–39)
ATRIAL RATE: 65 BPM
ATRIAL RATE: 71 BPM
BASOPHILS # BLD AUTO: 0 THOUSANDS/ΜL (ref 0–0.1)
BASOPHILS NFR BLD AUTO: 1 % (ref 0–2)
BILIRUB SERPL-MCNC: 0.3 MG/DL (ref 0.2–1)
BILIRUB UR QL STRIP: NEGATIVE
BUN SERPL-MCNC: 18 MG/DL (ref 7–25)
CALCIUM SERPL-MCNC: 9.6 MG/DL (ref 8.6–10.5)
CHLORIDE SERPL-SCNC: 103 MMOL/L (ref 98–107)
CLARITY UR: CLEAR
CO2 SERPL-SCNC: 27 MMOL/L (ref 21–31)
COLOR UR: YELLOW
CREAT SERPL-MCNC: 0.97 MG/DL (ref 0.6–1.2)
DEPRECATED D DIMER PPP: 286 NG/ML (FEU)
EOSINOPHIL # BLD AUTO: 0.1 THOUSAND/ΜL (ref 0–0.61)
EOSINOPHIL NFR BLD AUTO: 3 % (ref 0–5)
ERYTHROCYTE [DISTWIDTH] IN BLOOD BY AUTOMATED COUNT: 13.9 % (ref 11.5–14.5)
GFR SERPL CREATININE-BSD FRML MDRD: 68 ML/MIN/1.73SQ M
GLUCOSE SERPL-MCNC: 96 MG/DL (ref 65–99)
GLUCOSE UR STRIP-MCNC: NEGATIVE MG/DL
HCT VFR BLD AUTO: 41.7 % (ref 42–47)
HGB BLD-MCNC: 14 G/DL (ref 12–16)
HGB UR QL STRIP.AUTO: NEGATIVE
INR PPP: 0.85 (ref 0.9–1.5)
KETONES UR STRIP-MCNC: NEGATIVE MG/DL
LEUKOCYTE ESTERASE UR QL STRIP: NEGATIVE
LYMPHOCYTES # BLD AUTO: 1.6 THOUSANDS/ΜL (ref 0.6–4.47)
LYMPHOCYTES NFR BLD AUTO: 32 % (ref 21–51)
MCH RBC QN AUTO: 31.3 PG (ref 26–34)
MCHC RBC AUTO-ENTMCNC: 33.6 G/DL (ref 31–37)
MCV RBC AUTO: 93 FL (ref 81–99)
MONOCYTES # BLD AUTO: 0.4 THOUSAND/ΜL (ref 0.17–1.22)
MONOCYTES NFR BLD AUTO: 8 % (ref 2–12)
NEUTROPHILS # BLD AUTO: 2.8 THOUSANDS/ΜL (ref 1.4–6.5)
NEUTS SEG NFR BLD AUTO: 57 % (ref 42–75)
NITRITE UR QL STRIP: NEGATIVE
P AXIS: 17 DEGREES
P AXIS: 71 DEGREES
PH UR STRIP.AUTO: 6 [PH]
PLATELET # BLD AUTO: 222 THOUSANDS/UL (ref 149–390)
PMV BLD AUTO: 8.6 FL (ref 8.6–11.7)
POTASSIUM SERPL-SCNC: 3.3 MMOL/L (ref 3.5–5.5)
PR INTERVAL: 158 MS
PR INTERVAL: 160 MS
PROT SERPL-MCNC: 6.8 G/DL (ref 6.4–8.9)
PROT UR STRIP-MCNC: NEGATIVE MG/DL
PROTHROMBIN TIME: 9.8 SECONDS (ref 10.2–13)
QRS AXIS: -7 DEGREES
QRS AXIS: -7 DEGREES
QRSD INTERVAL: 82 MS
QRSD INTERVAL: 92 MS
QT INTERVAL: 426 MS
QT INTERVAL: 460 MS
QTC INTERVAL: 462 MS
QTC INTERVAL: 478 MS
RBC # BLD AUTO: 4.49 MILLION/UL (ref 3.9–5.2)
SODIUM SERPL-SCNC: 138 MMOL/L (ref 134–143)
SP GR UR STRIP.AUTO: <=1.005 (ref 1–1.03)
T WAVE AXIS: 20 DEGREES
T WAVE AXIS: 40 DEGREES
TROPONIN I SERPL-MCNC: <0.03 NG/ML
TSH SERPL DL<=0.05 MIU/L-ACNC: 1.39 UIU/ML (ref 0.45–5.33)
UROBILINOGEN UR QL STRIP.AUTO: 0.2 E.U./DL
VENTRICULAR RATE: 65 BPM
VENTRICULAR RATE: 71 BPM
WBC # BLD AUTO: 4.9 THOUSAND/UL (ref 4.8–10.8)

## 2019-06-25 PROCEDURE — 81003 URINALYSIS AUTO W/O SCOPE: CPT | Performed by: PHYSICIAN ASSISTANT

## 2019-06-25 PROCEDURE — 93005 ELECTROCARDIOGRAM TRACING: CPT

## 2019-06-25 PROCEDURE — 99284 EMERGENCY DEPT VISIT MOD MDM: CPT

## 2019-06-25 PROCEDURE — 85379 FIBRIN DEGRADATION QUANT: CPT | Performed by: PHYSICIAN ASSISTANT

## 2019-06-25 PROCEDURE — 36415 COLL VENOUS BLD VENIPUNCTURE: CPT | Performed by: PHYSICIAN ASSISTANT

## 2019-06-25 PROCEDURE — 85730 THROMBOPLASTIN TIME PARTIAL: CPT | Performed by: PHYSICIAN ASSISTANT

## 2019-06-25 PROCEDURE — 93971 EXTREMITY STUDY: CPT

## 2019-06-25 PROCEDURE — 96374 THER/PROPH/DIAG INJ IV PUSH: CPT

## 2019-06-25 PROCEDURE — 85610 PROTHROMBIN TIME: CPT | Performed by: PHYSICIAN ASSISTANT

## 2019-06-25 PROCEDURE — 80053 COMPREHEN METABOLIC PANEL: CPT | Performed by: PHYSICIAN ASSISTANT

## 2019-06-25 PROCEDURE — 84484 ASSAY OF TROPONIN QUANT: CPT | Performed by: PHYSICIAN ASSISTANT

## 2019-06-25 PROCEDURE — 84443 ASSAY THYROID STIM HORMONE: CPT | Performed by: PHYSICIAN ASSISTANT

## 2019-06-25 PROCEDURE — 93010 ELECTROCARDIOGRAM REPORT: CPT | Performed by: INTERNAL MEDICINE

## 2019-06-25 PROCEDURE — 85025 COMPLETE CBC W/AUTO DIFF WBC: CPT | Performed by: PHYSICIAN ASSISTANT

## 2019-06-25 RX ORDER — ASPIRIN 325 MG
325 TABLET ORAL DAILY
COMMUNITY
End: 2021-12-22

## 2019-06-25 RX ORDER — LABETALOL 20 MG/4 ML (5 MG/ML) INTRAVENOUS SYRINGE
5 ONCE
Status: COMPLETED | OUTPATIENT
Start: 2019-06-25 | End: 2019-06-25

## 2019-06-25 RX ORDER — LABETALOL 20 MG/4 ML (5 MG/ML) INTRAVENOUS SYRINGE
10 ONCE
Status: DISCONTINUED | OUTPATIENT
Start: 2019-06-25 | End: 2019-06-25

## 2019-06-25 RX ORDER — POTASSIUM CHLORIDE 20 MEQ/1
20 TABLET, EXTENDED RELEASE ORAL ONCE
Status: COMPLETED | OUTPATIENT
Start: 2019-06-25 | End: 2019-06-25

## 2019-06-25 RX ADMIN — POTASSIUM CHLORIDE 20 MEQ: 1500 TABLET, EXTENDED RELEASE ORAL at 19:07

## 2019-06-25 RX ADMIN — LABETALOL 20 MG/4 ML (5 MG/ML) INTRAVENOUS SYRINGE 5 MG: at 18:47

## 2019-06-26 PROCEDURE — 93971 EXTREMITY STUDY: CPT | Performed by: SURGERY

## 2019-08-30 ENCOUNTER — APPOINTMENT (EMERGENCY)
Dept: CT IMAGING | Facility: HOSPITAL | Age: 54
End: 2019-08-30
Payer: COMMERCIAL

## 2019-08-30 ENCOUNTER — APPOINTMENT (EMERGENCY)
Dept: RADIOLOGY | Facility: HOSPITAL | Age: 54
End: 2019-08-30
Payer: COMMERCIAL

## 2019-08-30 ENCOUNTER — HOSPITAL ENCOUNTER (EMERGENCY)
Facility: HOSPITAL | Age: 54
Discharge: HOME | End: 2019-08-30
Attending: FAMILY MEDICINE | Admitting: EMERGENCY MEDICINE
Payer: COMMERCIAL

## 2019-08-30 VITALS
HEART RATE: 79 BPM | WEIGHT: 160 LBS | HEIGHT: 65 IN | RESPIRATION RATE: 16 BRPM | TEMPERATURE: 97.6 F | OXYGEN SATURATION: 100 % | DIASTOLIC BLOOD PRESSURE: 62 MMHG | BODY MASS INDEX: 26.66 KG/M2 | SYSTOLIC BLOOD PRESSURE: 120 MMHG

## 2019-08-30 DIAGNOSIS — R20.2 PARESTHESIA OF LEFT UPPER LIMB: ICD-10-CM

## 2019-08-30 DIAGNOSIS — R07.9 CHEST PAIN: Primary | ICD-10-CM

## 2019-08-30 DIAGNOSIS — E87.6 HYPOKALEMIA: ICD-10-CM

## 2019-08-30 DIAGNOSIS — R07.89 ATYPICAL CHEST PAIN: ICD-10-CM

## 2019-08-30 DIAGNOSIS — R20.0 LEFT ARM NUMBNESS: ICD-10-CM

## 2019-08-30 LAB
AMPHETAMINES SERPL QL SCN: NEGATIVE
ANION GAP SERPL CALCULATED.3IONS-SCNC: 12 MMOL/L (ref 4–13)
ATRIAL RATE: 89 BPM
BARBITURATES UR QL: NEGATIVE
BASOPHILS # BLD AUTO: 0 THOUSANDS/ΜL (ref 0–0.1)
BASOPHILS NFR BLD AUTO: 1 % (ref 0–2)
BENZODIAZ UR QL: NEGATIVE
BILIRUB UR QL STRIP: NEGATIVE
BUN SERPL-MCNC: 19 MG/DL (ref 7–25)
CALCIUM SERPL-MCNC: 9.2 MG/DL (ref 8.6–10.5)
CHLORIDE SERPL-SCNC: 102 MMOL/L (ref 98–107)
CLARITY UR: CLEAR
CO2 SERPL-SCNC: 21 MMOL/L (ref 21–31)
COCAINE UR QL: NEGATIVE
COLOR UR: ABNORMAL
CREAT SERPL-MCNC: 0.81 MG/DL (ref 0.6–1.2)
DEPRECATED D DIMER PPP: 176 NG/ML (FEU)
EOSINOPHIL # BLD AUTO: 0.2 THOUSAND/ΜL (ref 0–0.61)
EOSINOPHIL NFR BLD AUTO: 3 % (ref 0–5)
ERYTHROCYTE [DISTWIDTH] IN BLOOD BY AUTOMATED COUNT: 13.9 % (ref 11.5–14.5)
GFR SERPL CREATININE-BSD FRML MDRD: 84 ML/MIN/1.73SQ M
GLUCOSE SERPL-MCNC: 83 MG/DL (ref 65–140)
GLUCOSE SERPL-MCNC: 96 MG/DL (ref 65–99)
GLUCOSE UR STRIP-MCNC: NEGATIVE MG/DL
HCT VFR BLD AUTO: 39.2 % (ref 42–47)
HGB BLD-MCNC: 13.3 G/DL (ref 12–16)
HGB UR QL STRIP.AUTO: NEGATIVE
INR PPP: 0.96 (ref 0.9–1.5)
KETONES UR STRIP-MCNC: ABNORMAL MG/DL
LEUKOCYTE ESTERASE UR QL STRIP: NEGATIVE
LYMPHOCYTES # BLD AUTO: 1.6 THOUSANDS/ΜL (ref 0.6–4.47)
LYMPHOCYTES NFR BLD AUTO: 29 % (ref 21–51)
MCH RBC QN AUTO: 31.1 PG (ref 26–34)
MCHC RBC AUTO-ENTMCNC: 33.8 G/DL (ref 31–37)
MCV RBC AUTO: 92 FL (ref 81–99)
METHADONE UR QL: NEGATIVE
MONOCYTES # BLD AUTO: 0.5 THOUSAND/ΜL (ref 0.17–1.22)
MONOCYTES NFR BLD AUTO: 9 % (ref 2–12)
NEUTROPHILS # BLD AUTO: 3.2 THOUSANDS/ΜL (ref 1.4–6.5)
NEUTS SEG NFR BLD AUTO: 59 % (ref 42–75)
NITRITE UR QL STRIP: NEGATIVE
OPIATES UR QL SCN: NEGATIVE
P AXIS: 67 DEGREES
PCP UR QL: NEGATIVE
PH UR STRIP.AUTO: 5.5 [PH]
PLATELET # BLD AUTO: 243 THOUSANDS/UL (ref 149–390)
PMV BLD AUTO: 8.7 FL (ref 8.6–11.7)
POTASSIUM SERPL-SCNC: 3.2 MMOL/L (ref 3.5–5.5)
PR INTERVAL: 156 MS
PROT UR STRIP-MCNC: NEGATIVE MG/DL
PROTHROMBIN TIME: 11.1 SECONDS (ref 10.2–13)
QRS AXIS: -8 DEGREES
QRSD INTERVAL: 92 MS
QT INTERVAL: 392 MS
QTC INTERVAL: 476 MS
RBC # BLD AUTO: 4.27 MILLION/UL (ref 3.9–5.2)
SODIUM SERPL-SCNC: 135 MMOL/L (ref 134–143)
SP GR UR STRIP.AUTO: <=1.005 (ref 1–1.03)
T WAVE AXIS: 60 DEGREES
THC UR QL: NEGATIVE
TROPONIN I SERPL-MCNC: <0.03 NG/ML
TROPONIN I SERPL-MCNC: <0.03 NG/ML
UROBILINOGEN UR QL STRIP.AUTO: 0.2 E.U./DL
VENTRICULAR RATE: 89 BPM
WBC # BLD AUTO: 5.4 THOUSAND/UL (ref 4.8–10.8)

## 2019-08-30 PROCEDURE — 71045 X-RAY EXAM CHEST 1 VIEW: CPT

## 2019-08-30 PROCEDURE — 36415 COLL VENOUS BLD VENIPUNCTURE: CPT | Performed by: FAMILY MEDICINE

## 2019-08-30 PROCEDURE — 85379 FIBRIN DEGRADATION QUANT: CPT | Performed by: FAMILY MEDICINE

## 2019-08-30 PROCEDURE — 82948 REAGENT STRIP/BLOOD GLUCOSE: CPT

## 2019-08-30 PROCEDURE — 99285 EMERGENCY DEPT VISIT HI MDM: CPT

## 2019-08-30 PROCEDURE — 84484 ASSAY OF TROPONIN QUANT: CPT | Performed by: FAMILY MEDICINE

## 2019-08-30 PROCEDURE — 85610 PROTHROMBIN TIME: CPT | Performed by: FAMILY MEDICINE

## 2019-08-30 PROCEDURE — 93005 ELECTROCARDIOGRAM TRACING: CPT

## 2019-08-30 PROCEDURE — 93010 ELECTROCARDIOGRAM REPORT: CPT | Performed by: INTERNAL MEDICINE

## 2019-08-30 PROCEDURE — 80307 DRUG TEST PRSMV CHEM ANLYZR: CPT | Performed by: FAMILY MEDICINE

## 2019-08-30 PROCEDURE — 84484 ASSAY OF TROPONIN QUANT: CPT | Performed by: EMERGENCY MEDICINE

## 2019-08-30 PROCEDURE — 70450 CT HEAD/BRAIN W/O DYE: CPT

## 2019-08-30 PROCEDURE — 80048 BASIC METABOLIC PNL TOTAL CA: CPT | Performed by: FAMILY MEDICINE

## 2019-08-30 PROCEDURE — 81003 URINALYSIS AUTO W/O SCOPE: CPT | Performed by: FAMILY MEDICINE

## 2019-08-30 PROCEDURE — 85025 COMPLETE CBC W/AUTO DIFF WBC: CPT | Performed by: FAMILY MEDICINE

## 2019-08-30 RX ORDER — POTASSIUM CHLORIDE 20 MEQ/1
40 TABLET, EXTENDED RELEASE ORAL ONCE
Status: COMPLETED | OUTPATIENT
Start: 2019-08-30 | End: 2019-08-30

## 2019-08-30 RX ADMIN — POTASSIUM CHLORIDE 40 MEQ: 1500 TABLET, EXTENDED RELEASE ORAL at 18:49

## 2019-08-30 NOTE — ED PROVIDER NOTES
History  Chief Complaint   Patient presents with    Chest Pain       History provided by:  Patient and EMS personnel   used: No    Chest Pain   Pain location:  Substernal area  Pain quality: pressure    Pain radiates to:  L shoulder  Pain radiates to the back: no    Pain severity:  No pain  Onset quality:  Sudden  Timing:  Constant  Progression:  Resolved  Chronicity:  New  Context: at rest    Context: not breathing, no drug use, not lifting, no movement, no stress and no trauma    Relieved by:  Nitroglycerin and aspirin  Associated symptoms: numbness (Left arm numbness)    Associated symptoms: no abdominal pain, no anorexia, no anxiety, no headache, no PND, no shortness of breath, no syncope, not vomiting and no weakness    Risk factors: hypertension    Risk factors: no prior DVT/PE and no smoking      This is a 61-year-old female with history of hypertension received diagnosed with irregular heartbeat presented to ED with complain of chest pain and left arm numbness that started after she came home from work  Patient was rating her pain at 8/10 at that time radiating to left arm with left arm numbness  Patient states that she was having difficulty holding stopping her and  Patient was given 3 nitroglycerin and 325 mg aspirin prior to arrival   Upon arrival to the ED patient states her pain is 0/10 at this time  She still complain of numbness in her left arm  Patient states she never had similar episode in the past   She denies any cardiac history except irregular heartbeat  Patient states she is scheduled for carotid ultrasound and Holter monitor  Sitting comfortably does have slow and delayed respond to questions  Unsure if this is patient's baseline  Prior to Admission Medications   Prescriptions Last Dose Informant Patient Reported?  Taking?   aspirin 325 mg tablet   Yes No   Sig: Take 325 mg by mouth daily   fluticasone (FLONASE) 50 mcg/act nasal spray   No No   Si spray into each nostril daily      Facility-Administered Medications: None       History reviewed  No pertinent past medical history  Past Surgical History:   Procedure Laterality Date    HYSTERECTOMY         History reviewed  No pertinent family history  I have reviewed and agree with the history as documented  Social History     Tobacco Use    Smoking status: Former Smoker     Types: Cigarettes     Last attempt to quit: 2001     Years since quittin 6    Smokeless tobacco: Never Used   Substance Use Topics    Alcohol use: No    Drug use: No        Review of Systems   Constitutional: Negative  HENT: Negative  Eyes: Negative  Respiratory: Negative for shortness of breath  Cardiovascular: Positive for chest pain  Negative for syncope and PND  Gastrointestinal: Negative for abdominal pain, anorexia and vomiting  Genitourinary: Negative  Musculoskeletal: Negative  Neurological: Positive for numbness (Left arm numbness)  Negative for weakness and headaches  Physical Exam  Physical Exam   Constitutional: She is oriented to person, place, and time  She appears well-developed and well-nourished  No distress  HENT:   Head: Normocephalic and atraumatic  Eyes: Pupils are equal, round, and reactive to light  EOM are normal    Neck: Normal range of motion  Neck supple  Cardiovascular: Normal rate, regular rhythm and normal heart sounds  Pulmonary/Chest: Effort normal and breath sounds normal    Abdominal: Soft  Bowel sounds are normal  There is no tenderness  Musculoskeletal: Normal range of motion  Neurological: She is alert and oriented to person, place, and time  Skin: Skin is warm  Nursing note and vitals reviewed        Vital Signs  ED Triage Vitals [19 1740]   Temperature Pulse Respirations Blood Pressure SpO2   98 6 °F (37 °C) 86 16 150/69 94 %      Temp Source Heart Rate Source Patient Position - Orthostatic VS BP Location FiO2 (%)   Temporal Monitor Sitting Right arm --      Pain Score       No Pain           Vitals:    08/30/19 1740 08/30/19 1853 08/30/19 1857   BP: 150/69  146/67   Pulse: 86 86 86   Patient Position - Orthostatic VS: Sitting  Lying         Visual Acuity      ED Medications  Medications   potassium chloride (K-DUR,KLOR-CON) CR tablet 40 mEq (40 mEq Oral Given 8/30/19 1849)       Diagnostic Studies  Results Reviewed     Procedure Component Value Units Date/Time    Rapid drug screen, urine [[id number]]  (Normal) Collected:  08/30/19 1810    Lab Status:  Final result Specimen:  Urine Updated:  08/30/19 1835     Amph/Meth UR Negative     Barbiturate Ur Negative     Benzodiazepine Urine Negative     Cocaine Urine Negative     Methadone Urine Negative     Opiate Urine Negative     PCP Ur Negative     THC Urine Negative    Narrative:       FOR MEDICAL PURPOSES ONLY  IF CONFIRMATION NEEDED PLEASE CONTACT THE LAB WITHIN 5 DAYS      Drug Screen Cutoff Levels:  AMPHETAMINE/METHAMPHETAMINES  1000 ng/mL  BARBITURATES     200 ng/mL  BENZODIAZEPINES     200 ng/mL  COCAINE      300 ng/mL  METHADONE      300 ng/mL  OPIATES      300 ng/mL  PHENCYCLIDINE     25 ng/mL  THC       50 ng/mL      UA w Reflex to Microscopic w Reflex to Culture [[id number]]  (Abnormal) Collected:  08/30/19 1804    Lab Status:  Final result Specimen:  Urine, Clean Catch Updated:  08/30/19 1819     Color, UA Straw     Clarity, UA Clear     Specific Gravity, UA <=1 005     pH, UA 5 5     Leukocytes, UA Negative     Nitrite, UA Negative     Protein, UA Negative mg/dl      Glucose, UA Negative mg/dl      Ketones, UA 15 (1+) mg/dl      Urobilinogen, UA 0 2 E U /dl      Bilirubin, UA Negative     Blood, UA Negative    Basic metabolic panel [[id number]]  (Abnormal) Collected:  08/30/19 1744    Lab Status:  Final result Specimen:  Blood from Arm, Left Updated:  08/30/19 1810     Sodium 135 mmol/L      Potassium 3 2 mmol/L      Chloride 102 mmol/L      CO2 21 mmol/L      ANION GAP 12 mmol/L      BUN 19 mg/dL      Creatinine 0 81 mg/dL      Glucose 96 mg/dL      Calcium 9 2 mg/dL      eGFR 84 ml/min/1 73sq m     Narrative:       [place] guidelines for Chronic Kidney Disease (CKD):     Stage 1 with normal or high GFR (GFR > 90 mL/min/1 73 square meters)    Stage 2 Mild CKD (GFR = 60-89 mL/min/1 73 square meters)    Stage 3A Moderate CKD (GFR = 45-59 mL/min/1 73 square meters)    Stage 3B Moderate CKD (GFR = 30-44 mL/min/1 73 square meters)    Stage 4 Severe CKD (GFR = 15-29 mL/min/1 73 square meters)    Stage 5 End Stage CKD (GFR <15 mL/min/1 73 square meters)  Note: GFR calculation is accurate only with a steady state creatinine    Troponin I [[id number]]  (Normal) Collected:  08/30/19 1744    Lab Status:  Final result Specimen:  Blood from Arm, Left Updated:  08/30/19 1810     Troponin I <0 03 ng/mL     D-Dimer [[id number]]  (Normal) Collected:  08/30/19 1748    Lab Status:  Final result Specimen:  Blood from Arm, Left Updated:  08/30/19 1803     D-Dimer, Quant 176 ng/ml (FEU)     Protime-INR [[id number]]  (Normal) Collected:  08/30/19 1748    Lab Status:  Final result Specimen:  Blood from Arm, Left Updated:  08/30/19 1803     Protime 11 1 seconds      INR 0 96    CBC and differential [[id number]]  (Abnormal) Collected:  08/30/19 1744    Lab Status:  Final result Specimen:  Blood from Arm, Left Updated:  08/30/19 1751     WBC 5 40 Thousand/uL      RBC 4 27 Million/uL      Hemoglobin 13 3 g/dL      Hematocrit 39 2 %      MCV 92 fL      MCH 31 1 pg      MCHC 33 8 g/dL      RDW 13 9 %      MPV 8 7 fL      Platelets 540 Thousands/uL      Neutrophils Relative 59 %      Lymphocytes Relative 29 %      Monocytes Relative 9 %      Eosinophils Relative 3 %      Basophils Relative 1 %      Neutrophils Absolute 3 20 Thousands/µL      Lymphocytes Absolute 1 60 Thousands/µL      Monocytes Absolute 0 50 Thousand/µL      Eosinophils Absolute 0 20 Thousand/µL      Basophils Absolute 0 00 Thousands/µL Fingerstick Glucose (POCT) [[id number]]  (Normal) Collected:  08/30/19 1746    Lab Status:  Final result Updated:  08/30/19 1747     POC Glucose 83 mg/dl                  CT head wo contrast    (Results Pending)   XR chest 1 view    (Results Pending)              Procedures  Procedures       ED Course  ED Course as of Aug 30 1905   Fri Aug 30, 2019   1817 Negative troponin chest pain is 0/10 at this time  Troponin I: <0 03   1817 Patient is given a 40 mEq p o  Potassium  Potassium(!): 3 2 1900 Lab and x-ray result of discussed with the patient hospital by bedside will wait for 2nd troponin  Patient care is transferred to Dr Jose Hernandez    patient is awake alert oriented x3 denies any chest pain at this time  MDM    Disposition  Final diagnoses:   Chest pain   Hypokalemia   Left arm numbness     Time reflects when diagnosis was documented in both MDM as applicable and the Disposition within this note     Time User Action Codes Description Comment    8/30/2019  7:04 PM Adriana Terry [R07 9] Chest pain     8/30/2019  7:04 PM Adriana Terry [E87 6] Hypokalemia     8/30/2019  7:05 PM Elizabeth Graham Add [R20 0] Left arm numbness       ED Disposition     None      Follow-up Information    None         Patient's Medications   Discharge Prescriptions    No medications on file     No discharge procedures on file      ED Provider  Electronically Signed by           Eidlberto Vela MD  08/30/19 6564

## 2019-08-31 NOTE — ED NOTES
1st contact for d/c  Iv dc'ed  Pt denied questions or needs        Bennett Gonzalez RN  08/30/19 2959

## 2019-08-31 NOTE — ED PROVIDER NOTES
History  Chief Complaint   Patient presents with    Chest Pain     Patient received sign-out awaiting 2nd troponin  Prior to Admission Medications   Prescriptions Last Dose Informant Patient Reported? Taking?   aspirin 325 mg tablet   Yes No   Sig: Take 325 mg by mouth daily   fluticasone (FLONASE) 50 mcg/act nasal spray   No No   Si spray into each nostril daily      Facility-Administered Medications: None       History reviewed  No pertinent past medical history  Past Surgical History:   Procedure Laterality Date    HYSTERECTOMY         History reviewed  No pertinent family history  I have reviewed and agree with the history as documented  Social History     Tobacco Use    Smoking status: Former Smoker     Types: Cigarettes     Last attempt to quit: 2001     Years since quittin 6    Smokeless tobacco: Never Used   Substance Use Topics    Alcohol use: No    Drug use: No        Review of Systems   Cardiovascular: Positive for chest pain  Neurological: Positive for numbness  Physical Exam  Physical Exam   Constitutional: She is oriented to person, place, and time  She appears well-developed and well-nourished  Cardiovascular: Normal rate and regular rhythm  Pulmonary/Chest: Effort normal and breath sounds normal    Musculoskeletal:   Negative Phalen sign  Negative Tinel sign  On the left breast   Neurological: She is alert and oriented to person, place, and time  5/5 muscle strength bilateral upper extremities  Grossly nonfocal    Skin: Skin is warm and dry  Psychiatric: She has a normal mood and affect  Her behavior is normal    Nursing note and vitals reviewed        Vital Signs  ED Triage Vitals [19 1740]   Temperature Pulse Respirations Blood Pressure SpO2   98 6 °F (37 °C) 86 16 150/69 94 %      Temp Source Heart Rate Source Patient Position - Orthostatic VS BP Location FiO2 (%)   Temporal Monitor Sitting Right arm --      Pain Score       No Pain Vitals:    08/30/19 1853 08/30/19 1857 08/30/19 2000 08/30/19 2130   BP:  146/67 121/75 120/69   Pulse: 86 86 74 76   Patient Position - Orthostatic VS:  Lying Lying          Visual Acuity      ED Medications  Medications   potassium chloride (K-DUR,KLOR-CON) CR tablet 40 mEq (40 mEq Oral Given 8/30/19 1849)       Diagnostic Studies  Results Reviewed     Procedure Component Value Units Date/Time    Troponin I [[id number]]  (Normal) Collected:  08/30/19 2043    Lab Status:  Final result Specimen:  Blood from Arm, Left Updated:  08/30/19 2126     Troponin I <0 03 ng/mL     Rapid drug screen, urine [[id number]]  (Normal) Collected:  08/30/19 1810    Lab Status:  Final result Specimen:  Urine Updated:  08/30/19 1835     Amph/Meth UR Negative     Barbiturate Ur Negative     Benzodiazepine Urine Negative     Cocaine Urine Negative     Methadone Urine Negative     Opiate Urine Negative     PCP Ur Negative     THC Urine Negative    Narrative:       FOR MEDICAL PURPOSES ONLY  IF CONFIRMATION NEEDED PLEASE CONTACT THE LAB WITHIN 5 DAYS      Drug Screen Cutoff Levels:  AMPHETAMINE/METHAMPHETAMINES  1000 ng/mL  BARBITURATES     200 ng/mL  BENZODIAZEPINES     200 ng/mL  COCAINE      300 ng/mL  METHADONE      300 ng/mL  OPIATES      300 ng/mL  PHENCYCLIDINE     25 ng/mL  THC       50 ng/mL      UA w Reflex to Microscopic w Reflex to Culture [[id number]]  (Abnormal) Collected:  08/30/19 1804    Lab Status:  Final result Specimen:  Urine, Clean Catch Updated:  08/30/19 1819     Color, UA Straw     Clarity, UA Clear     Specific Gravity, UA <=1 005     pH, UA 5 5     Leukocytes, UA Negative     Nitrite, UA Negative     Protein, UA Negative mg/dl      Glucose, UA Negative mg/dl      Ketones, UA 15 (1+) mg/dl      Urobilinogen, UA 0 2 E U /dl      Bilirubin, UA Negative     Blood, UA Negative    Basic metabolic panel [[id number]]  (Abnormal) Collected:  08/30/19 1744    Lab Status:  Final result Specimen:  Blood from Arm, Left Updated:  08/30/19 1810     Sodium 135 mmol/L      Potassium 3 2 mmol/L      Chloride 102 mmol/L      CO2 21 mmol/L      ANION GAP 12 mmol/L      BUN 19 mg/dL      Creatinine 0 81 mg/dL      Glucose 96 mg/dL      Calcium 9 2 mg/dL      eGFR 84 ml/min/1 73sq m     Narrative:       [place] guidelines for Chronic Kidney Disease (CKD):     Stage 1 with normal or high GFR (GFR > 90 mL/min/1 73 square meters)    Stage 2 Mild CKD (GFR = 60-89 mL/min/1 73 square meters)    Stage 3A Moderate CKD (GFR = 45-59 mL/min/1 73 square meters)    Stage 3B Moderate CKD (GFR = 30-44 mL/min/1 73 square meters)    Stage 4 Severe CKD (GFR = 15-29 mL/min/1 73 square meters)    Stage 5 End Stage CKD (GFR <15 mL/min/1 73 square meters)  Note: GFR calculation is accurate only with a steady state creatinine    Troponin I [[id number]]  (Normal) Collected:  08/30/19 1744    Lab Status:  Final result Specimen:  Blood from Arm, Left Updated:  08/30/19 1810     Troponin I <0 03 ng/mL     D-Dimer [[id number]]  (Normal) Collected:  08/30/19 1748    Lab Status:  Final result Specimen:  Blood from Arm, Left Updated:  08/30/19 1803     D-Dimer, Quant 176 ng/ml (FEU)     Protime-INR [[id number]]  (Normal) Collected:  08/30/19 1748    Lab Status:  Final result Specimen:  Blood from Arm, Left Updated:  08/30/19 1803     Protime 11 1 seconds      INR 0 96    CBC and differential [[id number]]  (Abnormal) Collected:  08/30/19 1744    Lab Status:  Final result Specimen:  Blood from Arm, Left Updated:  08/30/19 1751     WBC 5 40 Thousand/uL      RBC 4 27 Million/uL      Hemoglobin 13 3 g/dL      Hematocrit 39 2 %      MCV 92 fL      MCH 31 1 pg      MCHC 33 8 g/dL      RDW 13 9 %      MPV 8 7 fL      Platelets 375 Thousands/uL      Neutrophils Relative 59 %      Lymphocytes Relative 29 %      Monocytes Relative 9 %      Eosinophils Relative 3 %      Basophils Relative 1 %      Neutrophils Absolute 3 20 Thousands/µL      Lymphocytes Absolute 1 60 Thousands/µL      Monocytes Absolute 0 50 Thousand/µL      Eosinophils Absolute 0 20 Thousand/µL      Basophils Absolute 0 00 Thousands/µL     Fingerstick Glucose (POCT) [[id number]]  (Normal) Collected:  08/30/19 1746    Lab Status:  Final result Updated:  08/30/19 1747     POC Glucose 83 mg/dl                  CT head wo contrast   Final Result by Penelope Boucher MD (08/30 1909)      No acute intracranial abnormality  Workstation performed: [id number]         XR chest 1 view    (Results Pending)              Procedures  Procedures       ED Course         HEART Risk Score      Most Recent Value   History  1 Filed at: 08/30/2019 1905   ECG  0 Filed at: 08/30/2019 1905   Age  1 Filed at: 08/30/2019 1905   Risk Factors  1 Filed at: 08/30/2019 1905   Troponin  0 Filed at: 08/30/2019 1905   Heart Score Risk Calculator   History  1 Filed at: 08/30/2019 1905   ECG  0 Filed at: 08/30/2019 1905   Age  1 Filed at: 08/30/2019 1905   Risk Factors  1 Filed at: 08/30/2019 1905   Troponin  0 Filed at: 08/30/2019 1905   HEART Score  3 Filed at: 08/30/2019 1905   HEART Score  3 Filed at: 08/30/2019 1905                            MDM  Number of Diagnoses or Management Options  Chest pain:   Hypokalemia:   Left arm numbness: established and improving  Diagnosis management comments: Patient reported paresthesias of the left hand  Neurologic exam normal at this time  Patient says sensation is improved  Patient with no chest pain    Cardiac workup normal will discharge to follow up with primary doctor       Amount and/or Complexity of Data Reviewed  Clinical lab tests: reviewed  Tests in the radiology section of CPT®: reviewed  Tests in the medicine section of CPT®: reviewed    Risk of Complications, Morbidity, and/or Mortality  Presenting problems: moderate  Diagnostic procedures: moderate  Management options: low        Disposition  Final diagnoses:   Chest pain   Hypokalemia   Left arm numbness Time reflects when diagnosis was documented in both MDM as applicable and the Disposition within this note     Time User Action Codes Description Comment    8/30/2019  7:04 PM Anaamy Jimenez Add [R07 9] Chest pain     8/30/2019  7:04 PM Mame Castillo [E87 6] Hypokalemia     8/30/2019  7:05 PM Ana Quarry Add [R20 0] Left arm numbness       ED Disposition     None      Follow-up Information    None         Patient's Medications   Discharge Prescriptions    No medications on file     No discharge procedures on file      ED Provider  Electronically Signed by           Silviano Bolton MD  08/30/19 6356

## 2019-12-31 ENCOUNTER — OFFICE VISIT (OUTPATIENT)
Dept: URGENT CARE | Facility: CLINIC | Age: 54
End: 2019-12-31

## 2019-12-31 ENCOUNTER — APPOINTMENT (EMERGENCY)
Dept: NON INVASIVE DIAGNOSTICS | Facility: HOSPITAL | Age: 54
End: 2019-12-31
Payer: COMMERCIAL

## 2019-12-31 ENCOUNTER — HOSPITAL ENCOUNTER (EMERGENCY)
Facility: HOSPITAL | Age: 54
Discharge: HOME | End: 2019-12-31
Attending: INTERNAL MEDICINE | Admitting: INTERNAL MEDICINE
Payer: COMMERCIAL

## 2019-12-31 VITALS
HEART RATE: 77 BPM | DIASTOLIC BLOOD PRESSURE: 69 MMHG | OXYGEN SATURATION: 100 % | RESPIRATION RATE: 16 BRPM | WEIGHT: 160 LBS | SYSTOLIC BLOOD PRESSURE: 155 MMHG | TEMPERATURE: 98.1 F | HEIGHT: 65 IN | BODY MASS INDEX: 26.66 KG/M2

## 2019-12-31 VITALS
SYSTOLIC BLOOD PRESSURE: 136 MMHG | WEIGHT: 160 LBS | BODY MASS INDEX: 26.66 KG/M2 | DIASTOLIC BLOOD PRESSURE: 73 MMHG | RESPIRATION RATE: 20 BRPM | TEMPERATURE: 97.9 F | HEIGHT: 65 IN | OXYGEN SATURATION: 98 % | HEART RATE: 81 BPM

## 2019-12-31 DIAGNOSIS — I80.01 SUPERFICIAL PHLEBITIS AND THROMBOPHLEBITIS OF RIGHT LOWER EXTREMITY: ICD-10-CM

## 2019-12-31 DIAGNOSIS — M79.661 RIGHT CALF PAIN: Primary | ICD-10-CM

## 2019-12-31 DIAGNOSIS — I82.4Z1 ACUTE DEEP VEIN THROMBOSIS (DVT) OF DISTAL VEIN OF RIGHT LOWER EXTREMITY (HCC): ICD-10-CM

## 2019-12-31 DIAGNOSIS — M79.604 RIGHT LEG PAIN: Primary | ICD-10-CM

## 2019-12-31 PROCEDURE — 93971 EXTREMITY STUDY: CPT | Performed by: SURGERY

## 2019-12-31 PROCEDURE — 99284 EMERGENCY DEPT VISIT MOD MDM: CPT | Performed by: INTERNAL MEDICINE

## 2019-12-31 PROCEDURE — 99283 EMERGENCY DEPT VISIT LOW MDM: CPT

## 2019-12-31 PROCEDURE — 93971 EXTREMITY STUDY: CPT

## 2019-12-31 RX ORDER — MULTIVIT-MIN/IRON FUM/FOLIC AC 7.5 MG-4
1 TABLET ORAL DAILY
COMMUNITY

## 2019-12-31 RX ORDER — DIAZEPAM 2 MG/1
TABLET ORAL
COMMUNITY
Start: 2019-12-19 | End: 2021-12-22

## 2019-12-31 RX ORDER — ATORVASTATIN CALCIUM 20 MG/1
TABLET, FILM COATED ORAL
COMMUNITY
Start: 2019-09-11 | End: 2021-12-22

## 2019-12-31 RX ORDER — DIPHENHYDRAMINE HCL 50 MG
50 CAPSULE ORAL
COMMUNITY
End: 2021-12-22

## 2019-12-31 NOTE — PROGRESS NOTES
[email or web address]        NAME: Nasir Pisano is a 48 y o  female  : 1965    MRN: [id number]  DATE: 2019  TIME: 4:10 PM    Assessment and Plan   Right calf pain [M79 661]  1  Right calf pain     2  Acute deep vein thrombosis (DVT) of distal vein of right lower extremity ([address] )  Transfer to other facility         Patient Instructions     1  Go directly to the [facility] in [place] for further evaluation  Chief Complaint     Chief Complaint   Patient presents with    Leg Pain     right 1 days         History of Present Illness       This is a 62-year-old female with a history of DVT in her right leg 3 years ago secondary to having a broken leg  She rise today with complaint of pain in her right calf all day long  She states that the pain is intense and feels like she has recurrence DVT  She was 3 years ago placed on Eliquis for 3 months for this  She is currently taking no anticoagulants  She has had no chest pain and no shortness of breath with this  Review of Systems   Review of Systems   Constitutional: Negative for fever  HENT: Negative for congestion  Respiratory: Negative for cough  Cardiovascular: Negative for chest pain  Musculoskeletal:        Sharp pain in the right calf           Current Medications       Current Outpatient Medications:     atorvastatin (LIPITOR) 20 mg tablet, One daily for lipids, Disp: , Rfl:     diazepam (VALIUM) 2 mg tablet, , Disp: , Rfl:     aspirin 325 mg tablet, Take 325 mg by mouth daily, Disp: , Rfl:     fluticasone (FLONASE) 50 mcg/act nasal spray, 1 spray into each nostril daily (Patient not taking: Reported on 2019), Disp: 16 g, Rfl: 0    Current Allergies     Allergies as of 2019 - Reviewed 2019   Allergen Reaction Noted    Metronidazole Other (See Comments) 11/15/2017            The following portions of the patient's history were reviewed and updated as appropriate: allergies, current medications, past family history, past medical history, past social history, past surgical history and problem list      Past Medical History:   Diagnosis Date    DVT of lower extremity (deep venous thrombosis) ([address] ) 06/01/2016    was on elequis x 3 months       Past Surgical History:   Procedure Laterality Date   [address]        History reviewed  No pertinent family history  Medications have been verified  Objective   /69   Pulse 77   Temp 98 1 °F (36 7 °C)   Resp 16   Ht 5' 5" (1 651 m)   Wt 72 6 kg (160 lb)   SpO2 100%   BMI 26 63 kg/m²        Physical Exam     Physical Exam   Constitutional: She is oriented to person, place, and time  She appears well-developed and well-nourished  Very anxious  HENT:   Head: Normocephalic and atraumatic  Right Ear: External ear normal    Left Ear: External ear normal    Nose: Nose normal    Mouth/Throat: Oropharynx is clear and moist    Eyes: Pupils are equal, round, and reactive to light  Conjunctivae and EOM are normal    Neck: Normal range of motion  Neck supple  Cardiovascular: Normal rate, regular rhythm and normal heart sounds  No murmur heard  Pulmonary/Chest: Effort normal and breath sounds normal  She has no wheezes  She has no rales  Musculoskeletal: Normal range of motion  There is slight tenderness to palpation of the right posterior calf  There is no swelling noted  Neurological: She is alert and oriented to person, place, and time  Skin: Skin is warm and dry  No rash noted  No erythema  Psychiatric: She has a normal mood and affect  Her behavior is normal    Nursing note and vitals reviewed  Pt with history of DVT, now with recurrent calf pain today  No SOB, or chest pain  Pt agrees to go to [facility] for further evaluation   I have spoken with Waqar Reynolds at the ER

## 2019-12-31 NOTE — ED PROVIDER NOTES
History  Chief Complaint   Patient presents with    Leg Pain     right calf pain  hx blood clot in same leg 3 years ago  woke up today with pain in right calf  sent from urgent care facility  pain worse with weight bearing     Pain and swelling right lower extremity today  She woke up with a charley horse just did get better  She has had a previous blood clot in that leg from a broken leg some 3 and half years ago  She is concerned that is returning  No significant past medical history otherwise          Prior to Admission Medications   Prescriptions Last Dose Informant Patient Reported? Taking? Multiple Vitamins-Minerals (MULTIVITAMIN WITH MINERALS) tablet   Yes Yes   Sig: Take 1 tablet by mouth daily   aspirin 325 mg tablet Not Taking at Unknown time  Yes No   Sig: Take 325 mg by mouth daily   atorvastatin (LIPITOR) 20 mg tablet 2019 at Unknown time  Yes Yes   Sig: One daily for lipids   diazepam (VALIUM) 2 mg tablet Not Taking at Unknown time  Yes No   diphenhydrAMINE (BENADRYL) 50 mg capsule   Yes Yes   Sig: Take 50 mg by mouth daily at bedtime as needed for itching   fluticasone (FLONASE) 50 mcg/act nasal spray Not Taking at Unknown time  No No   Si spray into each nostril daily   Patient not taking: Reported on 2019      Facility-Administered Medications: None       Past Medical History:   Diagnosis Date    DVT of lower extremity (deep venous thrombosis) ([facility] [address] ) 2016    was on elequis x 3 months       Past Surgical History:   Procedure Laterality Date    HYSTERECTOMY      VARICOSE VEIN SURGERY Right        History reviewed  No pertinent family history  I have reviewed and agree with the history as documented      Social History     Tobacco Use    Smoking status: Former Smoker     Types: Cigarettes     Last attempt to quit: 2001     Years since quittin 9    Smokeless tobacco: Never Used   Substance Use Topics    Alcohol use: No    Drug use: No        Review of Systems   Constitutional: Negative for chills and fever  HENT: Negative for rhinorrhea and sore throat  Eyes: Negative for visual disturbance  Respiratory: Negative for cough and shortness of breath  Cardiovascular: Positive for leg swelling  Negative for chest pain  Calf discomfort   Gastrointestinal: Negative for abdominal pain, diarrhea, nausea and vomiting  Genitourinary: Negative for dysuria  Musculoskeletal: Negative for back pain and myalgias  Skin: Negative for rash  Neurological: Negative for dizziness and headaches  Psychiatric/Behavioral: Negative for confusion  All other systems reviewed and are negative  Physical Exam  Physical Exam   Constitutional: She is oriented to person, place, and time  She appears well-developed and well-nourished  HENT:   Nose: Nose normal    Mouth/Throat: Oropharynx is clear and moist  No oropharyngeal exudate  Eyes: Pupils are equal, round, and reactive to light  Conjunctivae and EOM are normal  No scleral icterus  Neck: Normal range of motion  Neck supple  No JVD present  No tracheal deviation present  Cardiovascular: Normal rate, regular rhythm and normal heart sounds  No murmur heard  Pulmonary/Chest: Effort normal and breath sounds normal  No respiratory distress  She has no wheezes  She has no rales  Abdominal: Soft  Bowel sounds are normal  There is no tenderness  There is no guarding  Musculoskeletal: Normal range of motion  She exhibits tenderness  She exhibits no edema  Swelling tenderness right lower extremity, extensive amount of varicosities   Neurological: She is alert and oriented to person, place, and time  No cranial nerve deficit or sensory deficit  She exhibits normal muscle tone  5/5 motor, nl sens   Skin: Skin is warm and dry  Psychiatric: She has a normal mood and affect  Her behavior is normal    Nursing note and vitals reviewed        Vital Signs  ED Triage Vitals   Temperature Pulse Respirations Blood Pressure SpO2   12/31/19 1627 12/31/19 1627 12/31/19 1627 12/31/19 1627 12/31/19 1627   98 9 °F (37 2 °C) 75 18 159/74 100 %      Temp Source Heart Rate Source Patient Position - Orthostatic VS BP Location FiO2 (%)   12/31/19 1627 -- -- -- --   Temporal          Pain Score       12/31/19 1624       9           Vitals:    12/31/19 1627   BP: 159/74   Pulse: 75         Visual Acuity      ED Medications  Medications - No data to display    Diagnostic Studies  Results Reviewed     None                 No orders to display              Procedures  Procedures         ED Course                               MDM      Disposition  Final diagnoses:   None     ED Disposition     None      Follow-up Information    None         Patient's Medications   Discharge Prescriptions    No medications on file     No discharge procedures on file      ED Provider  Electronically Signed by           Shukri Allen DO  12/31/19 2217

## 2020-01-01 NOTE — DISCHARGE INSTRUCTIONS
Increase aspirin to 325 3 times a day for the next several days  If symptoms fail to improve consider prednisone, follow up with family doctor    Return if symptoms worsen

## 2020-06-11 ENCOUNTER — APPOINTMENT (EMERGENCY)
Dept: RADIOLOGY | Facility: HOSPITAL | Age: 55
End: 2020-06-11
Payer: COMMERCIAL

## 2020-06-11 ENCOUNTER — APPOINTMENT (EMERGENCY)
Dept: MRI IMAGING | Facility: HOSPITAL | Age: 55
End: 2020-06-11
Payer: COMMERCIAL

## 2020-06-11 ENCOUNTER — HOSPITAL ENCOUNTER (EMERGENCY)
Facility: HOSPITAL | Age: 55
Discharge: HOME | End: 2020-06-11
Attending: EMERGENCY MEDICINE | Admitting: EMERGENCY MEDICINE
Payer: COMMERCIAL

## 2020-06-11 VITALS
TEMPERATURE: 97.8 F | HEIGHT: 65 IN | RESPIRATION RATE: 18 BRPM | SYSTOLIC BLOOD PRESSURE: 133 MMHG | HEART RATE: 79 BPM | OXYGEN SATURATION: 98 % | BODY MASS INDEX: 27.49 KG/M2 | WEIGHT: 165 LBS | DIASTOLIC BLOOD PRESSURE: 70 MMHG

## 2020-06-11 DIAGNOSIS — E86.0 DEHYDRATION: ICD-10-CM

## 2020-06-11 DIAGNOSIS — R42 LIGHTHEADEDNESS: Primary | ICD-10-CM

## 2020-06-11 LAB
ALBUMIN SERPL BCP-MCNC: 4.3 G/DL (ref 3.5–5.7)
ALP SERPL-CCNC: 36 U/L (ref 40–150)
ALT SERPL W P-5'-P-CCNC: 15 U/L (ref 7–52)
AMPHETAMINES SERPL QL SCN: NEGATIVE
ANION GAP SERPL CALCULATED.3IONS-SCNC: 4 MMOL/L (ref 4–13)
APTT PPP: 26 SECONDS (ref 23–37)
AST SERPL W P-5'-P-CCNC: 17 U/L (ref 13–39)
BARBITURATES UR QL: NEGATIVE
BASOPHILS # BLD AUTO: 0 THOUSANDS/ΜL (ref 0–0.1)
BASOPHILS NFR BLD AUTO: 1 % (ref 0–2)
BENZODIAZ UR QL: NEGATIVE
BILIRUB SERPL-MCNC: 0.4 MG/DL (ref 0.2–1)
BILIRUB UR QL STRIP: NEGATIVE
BUN SERPL-MCNC: 15 MG/DL (ref 7–25)
CALCIUM SERPL-MCNC: 9.1 MG/DL (ref 8.6–10.5)
CHLORIDE SERPL-SCNC: 107 MMOL/L (ref 98–107)
CLARITY UR: CLEAR
CO2 SERPL-SCNC: 28 MMOL/L (ref 21–31)
COCAINE UR QL: NEGATIVE
COLOR UR: YELLOW
CREAT SERPL-MCNC: 0.72 MG/DL (ref 0.6–1.2)
EOSINOPHIL # BLD AUTO: 0.1 THOUSAND/ΜL (ref 0–0.61)
EOSINOPHIL NFR BLD AUTO: 2 % (ref 0–5)
ERYTHROCYTE [DISTWIDTH] IN BLOOD BY AUTOMATED COUNT: 13.7 % (ref 11.5–14.5)
GFR SERPL CREATININE-BSD FRML MDRD: 97 ML/MIN/1.73SQ M
GLUCOSE SERPL-MCNC: 102 MG/DL (ref 65–99)
GLUCOSE UR STRIP-MCNC: NEGATIVE MG/DL
HCT VFR BLD AUTO: 41.8 % (ref 42–47)
HGB BLD-MCNC: 13.8 G/DL (ref 12–16)
HGB UR QL STRIP.AUTO: NEGATIVE
INR PPP: 0.97 (ref 0.84–1.19)
KETONES UR STRIP-MCNC: ABNORMAL MG/DL
LEUKOCYTE ESTERASE UR QL STRIP: NEGATIVE
LYMPHOCYTES # BLD AUTO: 0.8 THOUSANDS/ΜL (ref 0.6–4.47)
LYMPHOCYTES NFR BLD AUTO: 19 % (ref 21–51)
MCH RBC QN AUTO: 31.1 PG (ref 26–34)
MCHC RBC AUTO-ENTMCNC: 33.1 G/DL (ref 31–37)
MCV RBC AUTO: 94 FL (ref 81–99)
METHADONE UR QL: NEGATIVE
MONOCYTES # BLD AUTO: 0.3 THOUSAND/ΜL (ref 0.17–1.22)
MONOCYTES NFR BLD AUTO: 8 % (ref 2–12)
NEUTROPHILS # BLD AUTO: 2.8 THOUSANDS/ΜL (ref 1.4–6.5)
NEUTS SEG NFR BLD AUTO: 71 % (ref 42–75)
NITRITE UR QL STRIP: NEGATIVE
OPIATES UR QL SCN: NEGATIVE
PCP UR QL: NEGATIVE
PH UR STRIP.AUTO: 5 [PH]
PLATELET # BLD AUTO: 213 THOUSANDS/UL (ref 149–390)
PMV BLD AUTO: 9.2 FL (ref 8.6–11.7)
POTASSIUM SERPL-SCNC: 4.2 MMOL/L (ref 3.5–5.5)
PROT SERPL-MCNC: 6.2 G/DL (ref 6.4–8.9)
PROT UR STRIP-MCNC: NEGATIVE MG/DL
PROTHROMBIN TIME: 12.4 SECONDS (ref 11.6–14.5)
RBC # BLD AUTO: 4.46 MILLION/UL (ref 3.9–5.2)
SODIUM SERPL-SCNC: 139 MMOL/L (ref 134–143)
SP GR UR STRIP.AUTO: >=1.03 (ref 1–1.03)
THC UR QL: NEGATIVE
TROPONIN I SERPL-MCNC: <0.03 NG/ML
TSH SERPL DL<=0.05 MIU/L-ACNC: 0.84 UIU/ML (ref 0.45–5.33)
UROBILINOGEN UR QL STRIP.AUTO: 0.2 E.U./DL
WBC # BLD AUTO: 4 THOUSAND/UL (ref 4.8–10.8)

## 2020-06-11 PROCEDURE — 80307 DRUG TEST PRSMV CHEM ANLYZR: CPT | Performed by: EMERGENCY MEDICINE

## 2020-06-11 PROCEDURE — 85025 COMPLETE CBC W/AUTO DIFF WBC: CPT | Performed by: EMERGENCY MEDICINE

## 2020-06-11 PROCEDURE — 36415 COLL VENOUS BLD VENIPUNCTURE: CPT | Performed by: EMERGENCY MEDICINE

## 2020-06-11 PROCEDURE — 99284 EMERGENCY DEPT VISIT MOD MDM: CPT

## 2020-06-11 PROCEDURE — 93005 ELECTROCARDIOGRAM TRACING: CPT

## 2020-06-11 PROCEDURE — 84443 ASSAY THYROID STIM HORMONE: CPT | Performed by: EMERGENCY MEDICINE

## 2020-06-11 PROCEDURE — 85730 THROMBOPLASTIN TIME PARTIAL: CPT | Performed by: EMERGENCY MEDICINE

## 2020-06-11 PROCEDURE — 99284 EMERGENCY DEPT VISIT MOD MDM: CPT | Performed by: EMERGENCY MEDICINE

## 2020-06-11 PROCEDURE — 96365 THER/PROPH/DIAG IV INF INIT: CPT

## 2020-06-11 PROCEDURE — 70547 MR ANGIOGRAPHY NECK W/O DYE: CPT

## 2020-06-11 PROCEDURE — 70551 MRI BRAIN STEM W/O DYE: CPT

## 2020-06-11 PROCEDURE — 84484 ASSAY OF TROPONIN QUANT: CPT | Performed by: EMERGENCY MEDICINE

## 2020-06-11 PROCEDURE — 96375 TX/PRO/DX INJ NEW DRUG ADDON: CPT

## 2020-06-11 PROCEDURE — 85610 PROTHROMBIN TIME: CPT | Performed by: EMERGENCY MEDICINE

## 2020-06-11 PROCEDURE — 80053 COMPREHEN METABOLIC PANEL: CPT | Performed by: EMERGENCY MEDICINE

## 2020-06-11 PROCEDURE — 71046 X-RAY EXAM CHEST 2 VIEWS: CPT

## 2020-06-11 PROCEDURE — 81003 URINALYSIS AUTO W/O SCOPE: CPT | Performed by: EMERGENCY MEDICINE

## 2020-06-11 RX ORDER — MIDAZOLAM HYDROCHLORIDE 2 MG/2ML
2 INJECTION, SOLUTION INTRAMUSCULAR; INTRAVENOUS ONCE
Status: COMPLETED | OUTPATIENT
Start: 2020-06-11 | End: 2020-06-11

## 2020-06-11 RX ADMIN — MIDAZOLAM HYDROCHLORIDE 2 MG: 1 INJECTION, SOLUTION INTRAMUSCULAR; INTRAVENOUS at 12:26

## 2020-06-11 RX ADMIN — SODIUM CHLORIDE, SODIUM LACTATE, POTASSIUM CHLORIDE, AND CALCIUM CHLORIDE 1000 ML: .6; .31; .03; .02 INJECTION, SOLUTION INTRAVENOUS at 14:44

## 2020-06-12 LAB
ATRIAL RATE: 67 BPM
P AXIS: 68 DEGREES
PR INTERVAL: 160 MS
QRS AXIS: -1 DEGREES
QRSD INTERVAL: 84 MS
QT INTERVAL: 444 MS
QTC INTERVAL: 469 MS
T WAVE AXIS: 33 DEGREES
VENTRICULAR RATE: 67 BPM

## 2020-06-12 PROCEDURE — 93010 ELECTROCARDIOGRAM REPORT: CPT | Performed by: INTERNAL MEDICINE

## 2020-08-21 ENCOUNTER — HOSPITAL ENCOUNTER (EMERGENCY)
Facility: HOSPITAL | Age: 55
Discharge: HOME | End: 2020-08-21
Attending: EMERGENCY MEDICINE | Admitting: EMERGENCY MEDICINE
Payer: COMMERCIAL

## 2020-08-21 ENCOUNTER — APPOINTMENT (EMERGENCY)
Dept: RADIOLOGY | Facility: HOSPITAL | Age: 55
End: 2020-08-21
Payer: COMMERCIAL

## 2020-08-21 VITALS
TEMPERATURE: 97.6 F | HEIGHT: 65 IN | HEART RATE: 77 BPM | WEIGHT: 165 LBS | SYSTOLIC BLOOD PRESSURE: 144 MMHG | DIASTOLIC BLOOD PRESSURE: 74 MMHG | OXYGEN SATURATION: 99 % | RESPIRATION RATE: 18 BRPM | BODY MASS INDEX: 27.49 KG/M2

## 2020-08-21 DIAGNOSIS — R00.2 PALPITATIONS: Primary | ICD-10-CM

## 2020-08-21 LAB
ALBUMIN SERPL BCP-MCNC: 4.6 G/DL (ref 3.5–5.7)
ALP SERPL-CCNC: 47 U/L (ref 40–150)
ALT SERPL W P-5'-P-CCNC: 20 U/L (ref 7–52)
ANION GAP SERPL CALCULATED.3IONS-SCNC: 11 MMOL/L (ref 4–13)
AST SERPL W P-5'-P-CCNC: 17 U/L (ref 13–39)
BASOPHILS # BLD AUTO: 0.1 THOUSANDS/ΜL (ref 0–0.1)
BASOPHILS NFR BLD AUTO: 1 % (ref 0–2)
BILIRUB SERPL-MCNC: 0.4 MG/DL (ref 0.2–1)
BUN SERPL-MCNC: 19 MG/DL (ref 7–25)
CALCIUM SERPL-MCNC: 9.2 MG/DL (ref 8.6–10.5)
CHLORIDE SERPL-SCNC: 104 MMOL/L (ref 98–107)
CO2 SERPL-SCNC: 24 MMOL/L (ref 21–31)
CREAT SERPL-MCNC: 1.05 MG/DL (ref 0.6–1.2)
EOSINOPHIL # BLD AUTO: 0.2 THOUSAND/ΜL (ref 0–0.61)
EOSINOPHIL NFR BLD AUTO: 3 % (ref 0–5)
ERYTHROCYTE [DISTWIDTH] IN BLOOD BY AUTOMATED COUNT: 13.7 % (ref 11.5–14.5)
GFR SERPL CREATININE-BSD FRML MDRD: 61 ML/MIN/1.73SQ M
GLUCOSE SERPL-MCNC: 87 MG/DL (ref 65–99)
GLUCOSE SERPL-MCNC: 90 MG/DL (ref 65–140)
HCT VFR BLD AUTO: 41.7 % (ref 42–47)
HGB BLD-MCNC: 14.1 G/DL (ref 12–16)
LYMPHOCYTES # BLD AUTO: 1.5 THOUSANDS/ΜL (ref 0.6–4.47)
LYMPHOCYTES NFR BLD AUTO: 26 % (ref 21–51)
MAGNESIUM SERPL-MCNC: 2 MG/DL (ref 1.9–2.7)
MCH RBC QN AUTO: 31 PG (ref 26–34)
MCHC RBC AUTO-ENTMCNC: 33.8 G/DL (ref 31–37)
MCV RBC AUTO: 92 FL (ref 81–99)
MONOCYTES # BLD AUTO: 0.5 THOUSAND/ΜL (ref 0.17–1.22)
MONOCYTES NFR BLD AUTO: 9 % (ref 2–12)
NEUTROPHILS # BLD AUTO: 3.5 THOUSANDS/ΜL (ref 1.4–6.5)
NEUTS SEG NFR BLD AUTO: 62 % (ref 42–75)
PLATELET # BLD AUTO: 234 THOUSANDS/UL (ref 149–390)
PMV BLD AUTO: 8.9 FL (ref 8.6–11.7)
POTASSIUM SERPL-SCNC: 3.3 MMOL/L (ref 3.5–5.5)
PROT SERPL-MCNC: 6.8 G/DL (ref 6.4–8.9)
RBC # BLD AUTO: 4.54 MILLION/UL (ref 3.9–5.2)
SODIUM SERPL-SCNC: 139 MMOL/L (ref 134–143)
TROPONIN I SERPL-MCNC: <0.03 NG/ML
TSH SERPL DL<=0.05 MIU/L-ACNC: 1.46 UIU/ML (ref 0.45–5.33)
WBC # BLD AUTO: 5.7 THOUSAND/UL (ref 4.8–10.8)

## 2020-08-21 PROCEDURE — 84443 ASSAY THYROID STIM HORMONE: CPT | Performed by: PHYSICIAN ASSISTANT

## 2020-08-21 PROCEDURE — 71045 X-RAY EXAM CHEST 1 VIEW: CPT

## 2020-08-21 PROCEDURE — 93005 ELECTROCARDIOGRAM TRACING: CPT

## 2020-08-21 PROCEDURE — 36415 COLL VENOUS BLD VENIPUNCTURE: CPT | Performed by: PHYSICIAN ASSISTANT

## 2020-08-21 PROCEDURE — 82948 REAGENT STRIP/BLOOD GLUCOSE: CPT

## 2020-08-21 PROCEDURE — 85025 COMPLETE CBC W/AUTO DIFF WBC: CPT | Performed by: PHYSICIAN ASSISTANT

## 2020-08-21 PROCEDURE — 99285 EMERGENCY DEPT VISIT HI MDM: CPT

## 2020-08-21 PROCEDURE — 84484 ASSAY OF TROPONIN QUANT: CPT | Performed by: PHYSICIAN ASSISTANT

## 2020-08-21 PROCEDURE — 80053 COMPREHEN METABOLIC PANEL: CPT | Performed by: PHYSICIAN ASSISTANT

## 2020-08-21 PROCEDURE — 99285 EMERGENCY DEPT VISIT HI MDM: CPT | Performed by: PHYSICIAN ASSISTANT

## 2020-08-21 PROCEDURE — 83735 ASSAY OF MAGNESIUM: CPT | Performed by: PHYSICIAN ASSISTANT

## 2020-08-21 PROCEDURE — 96361 HYDRATE IV INFUSION ADD-ON: CPT

## 2020-08-21 PROCEDURE — 96360 HYDRATION IV INFUSION INIT: CPT

## 2020-08-21 RX ORDER — POTASSIUM CHLORIDE 20 MEQ/1
20 TABLET, EXTENDED RELEASE ORAL ONCE
Status: COMPLETED | OUTPATIENT
Start: 2020-08-21 | End: 2020-08-21

## 2020-08-21 RX ADMIN — SODIUM CHLORIDE 1000 ML: 0.9 INJECTION, SOLUTION INTRAVENOUS at 20:20

## 2020-08-21 RX ADMIN — POTASSIUM CHLORIDE 20 MEQ: 1500 TABLET, EXTENDED RELEASE ORAL at 21:09

## 2020-08-22 LAB
ATRIAL RATE: 83 BPM
P AXIS: 74 DEGREES
PR INTERVAL: 158 MS
QRS AXIS: 4 DEGREES
QRSD INTERVAL: 94 MS
QT INTERVAL: 414 MS
QTC INTERVAL: 486 MS
T WAVE AXIS: 59 DEGREES
VENTRICULAR RATE: 83 BPM

## 2020-08-22 PROCEDURE — 93010 ELECTROCARDIOGRAM REPORT: CPT | Performed by: INTERNAL MEDICINE

## 2020-08-22 NOTE — ED PROVIDER NOTES
History  Chief Complaint   Patient presents with    Rapid Heart Rate     reports has been symptomatic of fast heart rate - has been feeling her heart racing and checking with pulse oximeter and heart rate at rest has been in the 6's     53-year-old female presents emergency department concern for palpitations  She states that she feels her heart racing at times she is noted to be between 110 and 130 with a finger pulse ox at home  She states that physical exertion ambulation 10 to trigger her symptoms the most   She states that she feels like she is going to fall over and pass out the symptoms began  She states the symptoms can last for several hours to a day at a time  She states that symptoms have been ongoing for approximately 1 week  Allergies reviewed          Prior to Admission Medications   Prescriptions Last Dose Informant Patient Reported? Taking? Multiple Vitamins-Minerals (MULTIVITAMIN WITH MINERALS) tablet 2020 at Unknown time  Yes Yes   Sig: Take 1 tablet by mouth daily   aspirin 325 mg tablet Not Taking at Unknown time  Yes No   Sig: Take 325 mg by mouth daily   atorvastatin (LIPITOR) 20 mg tablet Not Taking at Unknown time  Yes No   Sig: One daily for lipids   diazepam (VALIUM) 2 mg tablet Not Taking at Unknown time  Yes No   diphenhydrAMINE (BENADRYL) 50 mg capsule 2020 at Unknown time  Yes Yes   Sig: Take 50 mg by mouth daily at bedtime as needed for itching   fluticasone (FLONASE) 50 mcg/act nasal spray   No No   Si spray into each nostril daily   Patient not taking: Reported on 2019      Facility-Administered Medications: None       Past Medical History:   Diagnosis Date    DVT of lower extremity (deep venous thrombosis) ([facility] [address] ) 2016    was on elequis x 3 months       Past Surgical History:   Procedure Laterality Date    HYSTERECTOMY      VARICOSE VEIN SURGERY Right        History reviewed  No pertinent family history    I have reviewed and agree with the history as documented  E-Cigarette/Vaping     E-Cigarette/Vaping Substances     Social History     Tobacco Use    Smoking status: Former Smoker     Types: Cigarettes     Last attempt to quit: 2001     Years since quittin 6    Smokeless tobacco: Never Used    Tobacco comment: quit 20 years ago   Substance Use Topics    Alcohol use: No    Drug use: No       Review of Systems   Constitutional: Negative for chills, fatigue and fever  HENT: Negative for congestion, ear pain, rhinorrhea, sinus pressure, sneezing, sore throat and trouble swallowing  Eyes: Negative for discharge and itching  Respiratory: Positive for shortness of breath  Negative for cough, chest tightness, wheezing and stridor  Cardiovascular: Positive for palpitations  Negative for chest pain  Gastrointestinal: Negative for abdominal pain, diarrhea, nausea and vomiting  Neurological: Negative for dizziness, syncope, numbness and headaches  All other systems reviewed and are negative  Physical Exam  Physical Exam  Vitals signs and nursing note reviewed  Constitutional:       General: She is not in acute distress  Appearance: She is well-developed  She is not diaphoretic  HENT:      Head: Normocephalic and atraumatic  Right Ear: External ear normal       Left Ear: External ear normal       Nose: Nose normal    Eyes:      Conjunctiva/sclera: Conjunctivae normal       Pupils: Pupils are equal, round, and reactive to light  Neck:      Musculoskeletal: Normal range of motion  Cardiovascular:      Rate and Rhythm: Normal rate and regular rhythm  Heart sounds: Normal heart sounds  No murmur  No friction rub  No gallop  Pulmonary:      Effort: Pulmonary effort is normal  No respiratory distress  Breath sounds: Normal breath sounds  No stridor  No wheezing or rales  Abdominal:      General: Bowel sounds are normal  There is no distension  Palpations: Abdomen is soft  Tenderness:  There is no abdominal tenderness  There is no guarding  Musculoskeletal: Normal range of motion  General: No tenderness  Skin:     General: Skin is warm  Capillary Refill: Capillary refill takes less than 2 seconds  Neurological:      Mental Status: She is alert and oriented to person, place, and time  Vital Signs  ED Triage Vitals   Temperature Pulse Respirations Blood Pressure SpO2   08/21/20 1951 08/21/20 1951 08/21/20 1951 08/21/20 1951 08/21/20 1951   97 6 °F (36 4 °C) 86 18 164/78 98 %      Temp Source Heart Rate Source Patient Position - Orthostatic VS BP Location FiO2 (%)   08/21/20 1951 08/21/20 1951 08/21/20 2202 08/21/20 2202 --   Tympanic Monitor Sitting Left arm       Pain Score       --                  Vitals:    08/21/20 2045 08/21/20 2100 08/21/20 2115 08/21/20 2202   BP:    144/74   Pulse: 73 68 72 77   Patient Position - Orthostatic VS:    Sitting         Visual Acuity      ED Medications  Medications   sodium chloride 0 9 % bolus 1,000 mL (0 mL Intravenous Stopped 8/21/20 2203)   potassium chloride (K-DUR,KLOR-CON) CR tablet 20 mEq (20 mEq Oral Given 8/21/20 2109)       Diagnostic Studies  Results Reviewed     Procedure Component Value Units Date/Time    TSH, 3rd generation with Free T4 reflex [[id number]]  (Normal) Collected:  08/21/20 2016    Lab Status:  Final result Specimen:  Blood from Arm, Right Updated:  08/21/20 2121     TSH 3RD GENERATON 1 460 uIU/mL     Narrative:       Patients undergoing fluorescein dye angiography may retain small amounts of fluorescein in the body for 48-72 hours post procedure  Samples containing fluorescein can produce falsely depressed TSH values  If the patient had this procedure,a specimen should be resubmitted post fluorescein clearance        Troponin I [[id number]]  (Normal) Collected:  08/21/20 2016    Lab Status:  Final result Specimen:  Blood from Arm, Right Updated:  08/21/20 2051     Troponin I <0 03 ng/mL     Comprehensive metabolic panel [[id number]]  (Abnormal) Collected:  08/21/20 2016    Lab Status:  Final result Specimen:  Blood from Arm, Right Updated:  08/21/20 2049     Sodium 139 mmol/L      Potassium 3 3 mmol/L      Chloride 104 mmol/L      CO2 24 mmol/L      ANION GAP 11 mmol/L      BUN 19 mg/dL      Creatinine 1 05 mg/dL      Glucose 87 mg/dL      Calcium 9 2 mg/dL      AST 17 U/L      ALT 20 U/L      Alkaline Phosphatase 47 U/L      Total Protein 6 8 g/dL      Albumin 4 6 g/dL      Total Bilirubin 0 40 mg/dL      eGFR 61 ml/min/1 73sq m     Narrative:       [facility] guidelines for Chronic Kidney Disease (CKD):     Stage 1 with normal or high GFR (GFR > 90 mL/min/1 73 square meters)    Stage 2 Mild CKD (GFR = 60-89 mL/min/1 73 square meters)    Stage 3A Moderate CKD (GFR = 45-59 mL/min/1 73 square meters)    Stage 3B Moderate CKD (GFR = 30-44 mL/min/1 73 square meters)    Stage 4 Severe CKD (GFR = 15-29 mL/min/1 73 square meters)    Stage 5 End Stage CKD (GFR <15 mL/min/1 73 square meters)  Note: GFR calculation is accurate only with a steady state creatinine    Magnesium [[id number]]  (Normal) Collected:  08/21/20 2016    Lab Status:  Final result Specimen:  Blood from Arm, Right Updated:  08/21/20 2049     Magnesium 2 0 mg/dL     CBC and differential [[id number]]  (Abnormal) Collected:  08/21/20 2016    Lab Status:  Final result Specimen:  Blood from Arm, Right Updated:  08/21/20 2025     WBC 5 70 Thousand/uL      RBC 4 54 Million/uL      Hemoglobin 14 1 g/dL      Hematocrit 41 7 %      MCV 92 fL      MCH 31 0 pg      MCHC 33 8 g/dL      RDW 13 7 %      MPV 8 9 fL      Platelets 572 Thousands/uL      Neutrophils Relative 62 %      Lymphocytes Relative 26 %      Monocytes Relative 9 %      Eosinophils Relative 3 %      Basophils Relative 1 %      Neutrophils Absolute 3 50 Thousands/µL      Lymphocytes Absolute 1 50 Thousands/µL      Monocytes Absolute 0 50 Thousand/µL      Eosinophils Absolute 0 20 Thousand/µL      Basophils Absolute 0 10 Thousands/µL     Fingerstick Glucose (POCT) [[id number]]  (Normal) Collected:  08/21/20 2022    Lab Status:  Final result Updated:  08/21/20 2023     POC Glucose 90 mg/dl                  XR chest 1 view portable   ED Interpretation by Braden Marc PA-C (08/21 2053)   No acute osseous, cardiopulmonary or vascular abnormalities identified by me at this time  Final Result by Melita Novoa MD (08/22 0115)      No acute cardiopulmonary disease  Workstation performed: [id number]                    Procedures  Procedures         ED Course  ED Course as of Aug 22 1002   Fri Aug 21, 2020   2055 Patient was ambulated at length around the emergency department  She states that usually ambulation triggers her symptoms  She remained symptom free during this evaluation  MDM  Number of Diagnoses or Management Options  Diagnosis management comments: Benign physical examination  Patient was ambulated at length in the emergency department without triggering of patient's reported symptoms  She remains well-appearing in no acute distress  Disposition pending laboratory evaluation    Patient care handoff given to Dr Licha Middleton at 21:00       Amount and/or Complexity of Data Reviewed  Clinical lab tests: ordered and reviewed  Tests in the radiology section of CPT®: ordered and reviewed    Risk of Complications, Morbidity, and/or Mortality  Presenting problems: moderate  Diagnostic procedures: low  Management options: low    Patient Progress  Patient progress: stable        Disposition  Final diagnoses:   Palpitations     Time reflects when diagnosis was documented in both MDM as applicable and the Disposition within this note     Time User Action Codes Description Comment    8/21/2020  9:00 PM Artemio Cannon Add [R00 2] Palpitations       ED Disposition     ED Disposition Condition Date/Time Comment    Discharge Stable Fri Aug 21, 2020  9:35 PM Ricardo Side discharge to home/self care              Follow-up Information     Follow up With Specialties Details Why Contact Info Additional Information    [facility] Schedule an appointment as soon as possible for a visit in 1 week For follow up regarding your symptoms and recheck [address] [facility] [address], [address], [place], [place], [address]          Discharge Medication List as of 8/21/2020  9:55 PM      CONTINUE these medications which have NOT CHANGED    Details   diphenhydrAMINE (BENADRYL) 50 mg capsule Take 50 mg by mouth daily at bedtime as needed for itching, Historical Med      Multiple Vitamins-Minerals (MULTIVITAMIN WITH MINERALS) tablet Take 1 tablet by mouth daily, Historical Med      aspirin 325 mg tablet Take 325 mg by mouth daily, Historical Med      atorvastatin (LIPITOR) 20 mg tablet One daily for lipids, Historical Med      diazepam (VALIUM) 2 mg tablet Starting Thu 12/19/2019, Historical Med      fluticasone (FLONASE) 50 mcg/act nasal spray 1 spray into each nostril daily, Starting Wed 1/30/2019, Print               PDMP Review     None          ED Provider  Electronically Signed by           Pat López PA-C  08/22/20 1002

## 2020-09-08 ENCOUNTER — OFFICE VISIT (OUTPATIENT)
Dept: CARDIOLOGY CLINIC | Facility: CLINIC | Age: 55
End: 2020-09-08
Payer: COMMERCIAL

## 2020-09-08 VITALS
DIASTOLIC BLOOD PRESSURE: 82 MMHG | BODY MASS INDEX: 28.82 KG/M2 | SYSTOLIC BLOOD PRESSURE: 120 MMHG | HEART RATE: 78 BPM | WEIGHT: 173 LBS | HEIGHT: 65 IN

## 2020-09-08 DIAGNOSIS — R00.2 PALPITATIONS: ICD-10-CM

## 2020-09-08 DIAGNOSIS — E78.2 MIXED HYPERLIPIDEMIA: ICD-10-CM

## 2020-09-08 DIAGNOSIS — G47.9 SLEEP DISORDER: Primary | ICD-10-CM

## 2020-09-08 PROCEDURE — 99204 OFFICE O/P NEW MOD 45 MIN: CPT | Performed by: INTERNAL MEDICINE

## 2020-09-08 NOTE — ASSESSMENT & PLAN NOTE
Her sleep is poor  Benadryl has not been useful  She is not eager to take more pills  I suggested that she see 1 of my sleep physician colleagues and she would like to follow through with that

## 2020-09-08 NOTE — PROGRESS NOTES
Patient ID: Munir Watkins is a 48 y o  female  Plan:      Palpitations  The seem benign  An echocardiogram last year showed a very minimal amount of atrial ectopy  There has been no syncope  I am going to check an echocardiogram and if that looks okay then further cardiac workup will not be needed in the near term  Sleep disorder  Her sleep is poor  Benadryl has not been useful  She is not eager to take more pills  I suggested that she see 1 of my sleep physician colleagues and she would like to follow through with that  Hyperlipidemia  Tolerating statin therapy  Follow up Plan:  If the echocardiogram is okay then follow-up with me can be on an as-needed basis  Mainly I reassured her as to the nature of her palpitations today  HPI:  Patient is seen today after having a request from the emergency room  Periodically over the past few years she has had a sense of extra beats  This is mainly notable at night but can occur during the day  She actually was in our emergency room recently for such symptoms  All seemed benign there but cardiology follow-up was suggested  There was some atrial ectopy  There has never been syncope  A Holter monitor was relatively benign 1 year ago despite symptoms  No strong family history of very early CAD  Most recent or relevant cardiac/vascular testing:    Holter monitor 09/10/2019:  Heart rate  beats per minute  No ectopic runs  No prior echo      Past Surgical History:   Procedure Laterality Date    HYSTERECTOMY  1997    VARICOSE VEIN SURGERY Right      CMP:   Lab Results   Component Value Date     02/28/2014    K 3 3 (L) 08/21/2020    K 4 3 02/28/2014     08/21/2020     02/28/2014    CO2 24 08/21/2020    CO2 28 02/28/2014    BUN 19 08/21/2020    BUN 12 02/28/2014    CREATININE 1 05 08/21/2020    CREATININE 0 56 (L) 02/28/2014    GLUCOSE 100 02/28/2014    EGFR 61 08/21/2020       Lipid Profile: No results found for: CHOL, TRIG, HDL, LDL      Review of Systems   10  point ROS  was otherwise non pertinent or negative except as per HPI or as below  Gait: Normal         Objective:     /82   Pulse 78   Ht 5' 5" (1 651 m)   Wt 78 5 kg (173 lb)   BMI 28 79 kg/m²     PHYSICAL EXAM:    General:  Normal appearance in no distress  Eyes:  Anicteric  Oral mucosa:  Moist   Neck:  No JVD  Carotid upstrokes are brisk without bruits  No masses  Chest:  Clear to auscultation and percussion  Cardiac:  No palpable PMI  Normal S1 and S2  No murmur gallop or rub  Abdomen:  Soft and nontender  No palpable organomegaly or aortic enlargement  Extremities:  No peripheral edema  Musculoskeletal:  Symmetric  Vascular:  Femoral pulses are brisk without bruits  Popliteal pulses are intact bilaterally  Pedal pulses are intact  Neuro:  Grossly symmetric  Psych:  Alert and oriented x3          Current Outpatient Medications:     diphenhydrAMINE (BENADRYL) 50 mg capsule, Take 50 mg by mouth daily at bedtime as needed for itching, Disp: , Rfl:     Multiple Vitamins-Minerals (MULTIVITAMIN WITH MINERALS) tablet, Take 1 tablet by mouth daily, Disp: , Rfl:     aspirin 325 mg tablet, Take 325 mg by mouth daily, Disp: , Rfl:     atorvastatin (LIPITOR) 20 mg tablet, One daily for lipids, Disp: , Rfl:     diazepam (VALIUM) 2 mg tablet, , Disp: , Rfl:     fluticasone (FLONASE) 50 mcg/act nasal spray, 1 spray into each nostril daily (Patient not taking: Reported on 2019), Disp: 16 g, Rfl: 0  Allergies   Allergen Reactions    Metronidazole Other (See Comments)     disoriented     Past Medical History:   Diagnosis Date    DVT of lower extremity (deep venous thrombosis) ([facility] [address] ) 2016    was on elequis x 3 months    Hyperlipidemia            Social History     Tobacco Use   Smoking Status Former Smoker    Types: Cigarettes    Last attempt to quit: 2001    Years since quittin 6   Smokeless Tobacco Never Used Tobacco Comment    quit 20 years ago

## 2020-09-08 NOTE — ASSESSMENT & PLAN NOTE
The seem benign  An echocardiogram last year showed a very minimal amount of atrial ectopy  There has been no syncope  I am going to check an echocardiogram and if that looks okay then further cardiac workup will not be needed in the near term

## 2021-03-31 DIAGNOSIS — Z23 ENCOUNTER FOR IMMUNIZATION: ICD-10-CM

## 2021-04-14 ENCOUNTER — IMMUNIZATIONS (OUTPATIENT)
Dept: FAMILY MEDICINE CLINIC | Facility: HOSPITAL | Age: 56
End: 2021-04-14

## 2021-04-14 DIAGNOSIS — Z23 ENCOUNTER FOR IMMUNIZATION: Primary | ICD-10-CM

## 2021-04-14 PROCEDURE — 0001A SARS-COV-2 / COVID-19 MRNA VACCINE (PFIZER-BIONTECH) 30 MCG: CPT

## 2021-04-14 PROCEDURE — 91300 SARS-COV-2 / COVID-19 MRNA VACCINE (PFIZER-BIONTECH) 30 MCG: CPT

## 2021-05-05 ENCOUNTER — IMMUNIZATIONS (OUTPATIENT)
Dept: FAMILY MEDICINE CLINIC | Facility: HOSPITAL | Age: 56
End: 2021-05-05

## 2021-05-05 DIAGNOSIS — Z23 ENCOUNTER FOR IMMUNIZATION: Primary | ICD-10-CM

## 2021-05-05 PROCEDURE — 0002A SARS-COV-2 / COVID-19 MRNA VACCINE (PFIZER-BIONTECH) 30 MCG: CPT

## 2021-05-05 PROCEDURE — 91300 SARS-COV-2 / COVID-19 MRNA VACCINE (PFIZER-BIONTECH) 30 MCG: CPT

## 2021-12-22 ENCOUNTER — HOSPITAL ENCOUNTER (EMERGENCY)
Facility: HOSPITAL | Age: 56
Discharge: HOME | End: 2021-12-22
Attending: EMERGENCY MEDICINE
Payer: COMMERCIAL

## 2021-12-22 ENCOUNTER — APPOINTMENT (EMERGENCY)
Dept: CT IMAGING | Facility: HOSPITAL | Age: 56
End: 2021-12-22
Payer: COMMERCIAL

## 2021-12-22 ENCOUNTER — APPOINTMENT (EMERGENCY)
Dept: RADIOLOGY | Facility: HOSPITAL | Age: 56
End: 2021-12-22
Payer: COMMERCIAL

## 2021-12-22 VITALS
HEART RATE: 68 BPM | DIASTOLIC BLOOD PRESSURE: 94 MMHG | OXYGEN SATURATION: 100 % | BODY MASS INDEX: 29.16 KG/M2 | RESPIRATION RATE: 16 BRPM | TEMPERATURE: 98 F | HEIGHT: 65 IN | WEIGHT: 175 LBS | SYSTOLIC BLOOD PRESSURE: 151 MMHG

## 2021-12-22 DIAGNOSIS — S22.39XA RIB FRACTURE: Primary | ICD-10-CM

## 2021-12-22 PROCEDURE — 96372 THER/PROPH/DIAG INJ SC/IM: CPT

## 2021-12-22 PROCEDURE — 99284 EMERGENCY DEPT VISIT MOD MDM: CPT

## 2021-12-22 PROCEDURE — G1004 CDSM NDSC: HCPCS

## 2021-12-22 PROCEDURE — 71101 X-RAY EXAM UNILAT RIBS/CHEST: CPT

## 2021-12-22 PROCEDURE — 71250 CT THORAX DX C-: CPT

## 2021-12-22 PROCEDURE — 99285 EMERGENCY DEPT VISIT HI MDM: CPT | Performed by: EMERGENCY MEDICINE

## 2021-12-22 RX ORDER — OXYCODONE HYDROCHLORIDE AND ACETAMINOPHEN 5; 325 MG/1; MG/1
1 TABLET ORAL ONCE
Status: COMPLETED | OUTPATIENT
Start: 2021-12-22 | End: 2021-12-22

## 2021-12-22 RX ORDER — HYDROMORPHONE HCL/PF 1 MG/ML
0.5 SYRINGE (ML) INJECTION ONCE
Status: COMPLETED | OUTPATIENT
Start: 2021-12-22 | End: 2021-12-22

## 2021-12-22 RX ORDER — OXYCODONE HYDROCHLORIDE AND ACETAMINOPHEN 5; 325 MG/1; MG/1
1 TABLET ORAL EVERY 6 HOURS PRN
Qty: 10 TABLET | Refills: 0 | Status: SHIPPED | OUTPATIENT
Start: 2021-12-22

## 2021-12-22 RX ADMIN — HYDROMORPHONE HYDROCHLORIDE 0.5 MG: 1 INJECTION, SOLUTION INTRAMUSCULAR; INTRAVENOUS; SUBCUTANEOUS at 08:28

## 2021-12-22 RX ADMIN — OXYCODONE HYDROCHLORIDE AND ACETAMINOPHEN 1 TABLET: 5; 325 TABLET ORAL at 07:52

## 2022-04-05 ENCOUNTER — OFFICE VISIT (OUTPATIENT)
Dept: URGENT CARE | Facility: CLINIC | Age: 57
End: 2022-04-05
Payer: COMMERCIAL

## 2022-04-05 VITALS
HEIGHT: 65 IN | WEIGHT: 178 LBS | OXYGEN SATURATION: 98 % | HEART RATE: 72 BPM | RESPIRATION RATE: 18 BRPM | SYSTOLIC BLOOD PRESSURE: 152 MMHG | BODY MASS INDEX: 29.66 KG/M2 | TEMPERATURE: 98 F | DIASTOLIC BLOOD PRESSURE: 76 MMHG

## 2022-04-05 DIAGNOSIS — T69.1XXA CHILBLAINS, INITIAL ENCOUNTER: Primary | ICD-10-CM

## 2022-04-05 DIAGNOSIS — Z86.79 HISTORY OF RAYNAUD'S SYNDROME: ICD-10-CM

## 2022-04-05 DIAGNOSIS — M79.675 TOE PAIN, LEFT: ICD-10-CM

## 2022-04-05 PROCEDURE — 99213 OFFICE O/P EST LOW 20 MIN: CPT | Performed by: NURSE PRACTITIONER

## 2022-04-05 RX ORDER — CEPHALEXIN 500 MG/1
500 CAPSULE ORAL EVERY 12 HOURS SCHEDULED
Qty: 14 CAPSULE | Refills: 0 | Status: SHIPPED | OUTPATIENT
Start: 2022-04-05 | End: 2022-04-07 | Stop reason: SDUPTHER

## 2022-04-05 NOTE — PROGRESS NOTES
[address]        NAME: Kendall Watkins is a 49 y o  female  : 1965    MRN: [id number]  DATE: 2022  TIME: 11:33 AM    Assessment and Plan   Chilblains, initial encounter [facility]  1XXA]  1  Chilblains, initial encounter  cephalexin (KEFLEX) 500 mg capsule    left 3rd toe      2  History of Raynaud's syndrome  cephalexin (KEFLEX) 500 mg capsule   3  Toe pain, left      left 3rd toe          Patient Instructions       Follow up with PCP in 3-5 days  Proceed to  ER if symptoms worsen  You could have chilblains toe or a toe infection as your nail appears short  You have been prescribed cephalexin   You are to take tylenol or motrin for pain  Use lambs wool over the toe to prevent rubbing   Follow up with your podiatrist on Thursday as scheduled  Continue with your vascular doctor  Follow up with your PCP  Go to the ED if symptoms worsen        Chief Complaint     Chief Complaint   Patient presents with    Toe Pain     middle toe left foot pain and redness         History of Present Illness       This is a 49year old female who has a history of varicose veins, sees vascular along with piotr and is scheduled to see podiatry on Thursday for left 3rd toe pain and possible chilblains symptoms of the toe  She states the toe is painful  She denies any known infection or injury  She is concerned that she would not be able to wait until Thursday to see her podiatrist so she came here for evaluation  She states her first two toes are always white but this one appears red  Review of Systems   Review of Systems   Constitutional: Negative  HENT: Negative  Eyes: Negative  Respiratory: Negative  Cardiovascular: Negative  Gastrointestinal: Negative  Endocrine: Negative  Genitourinary: Negative  Musculoskeletal:        Left 3rd toe pain    Skin: Positive for color change  Allergic/Immunologic: Negative  Neurological: Negative  Hematological: Negative  Psychiatric/Behavioral: Negative  Current Medications       Current Outpatient Medications:     cephalexin (KEFLEX) 500 mg capsule, Take 1 capsule (500 mg total) by mouth every 12 (twelve) hours for 7 days, Disp: 14 capsule, Rfl: 0    Multiple Vitamins-Minerals (MULTIVITAMIN WITH MINERALS) tablet, Take 1 tablet by mouth daily (Patient not taking: Reported on 4/5/2022 ), Disp: , Rfl:     oxyCODONE-acetaminophen (Percocet) 5-325 mg per tablet, Take 1 tablet by mouth every 6 (six) hours as needed for moderate pain Max Daily Amount: 4 tablets (Patient not taking: Reported on 4/5/2022 ), Disp: 10 tablet, Rfl: 0    Current Allergies     Allergies as of 04/05/2022 - Reviewed 04/05/2022   Allergen Reaction Noted    Metronidazole Other (See Comments) 11/15/2017            The following portions of the patient's history were reviewed and updated as appropriate: allergies, current medications, past family history, past medical history, past social history, past surgical history and problem list      Past Medical History:   Diagnosis Date    DVT of lower extremity (deep venous thrombosis) ([facility] [address] ) 06/01/2016    was on elequis x 3 months    Hyperlipidemia     Raynaud disease        Past Surgical History:   Procedure Laterality Date    HYSTERECTOMY  1997    VARICOSE VEIN SURGERY Right        History reviewed  No pertinent family history  Medications have been verified  Objective   /76 (BP Location: Right arm, Patient Position: Sitting, Cuff Size: Standard)   Pulse 72   Temp 98 °F (36 7 °C)   Resp 18   Ht 5' 5" (1 651 m)   Wt 80 7 kg (178 lb)   SpO2 98%   BMI 29 62 kg/m²   No LMP recorded  Patient has had a hysterectomy  Physical Exam     Physical Exam  Vitals and nursing note reviewed  Constitutional:       General: She is not in acute distress  Appearance: Normal appearance  She is normal weight  She is not ill-appearing, toxic-appearing or diaphoretic     HENT:      Head: Normocephalic and atraumatic  Eyes:      Extraocular Movements: Extraocular movements intact  Cardiovascular:      Rate and Rhythm: Normal rate  Pulses: Normal pulses  Pulmonary:      Effort: Pulmonary effort is normal    Musculoskeletal:         General: Normal range of motion  Cervical back: Normal range of motion  Skin:     Capillary Refill: Capillary refill takes 2 to 3 seconds  Comments: Left foot is cool to touch  Toes 1-2-4-5 are light pink in color but quinn  3rd toe is red  TTP  FROM  Nail appears to be cut short into the right lateral side of the toe  No warmth or drainage from toe  There is hydrocortisone cream on the toe and it appears wet and soggy  Neurological:      General: No focal deficit present  Mental Status: She is alert and oriented to person, place, and time  Psychiatric:         Mood and Affect: Mood normal          Behavior: Behavior normal          Thought Content: Thought content normal          Judgment: Judgment normal                PDMP did not show any scheduled drugs prescribed  However in pt med list she had oxycodone prescribed 12/21    No record found for [place] or [place]

## 2022-04-05 NOTE — PATIENT INSTRUCTIONS
You could have chilblains toe or a toe infection as your nail appears short  You have been prescribed cephalexin   You are to take tylenol or motrin for pain    Use lambs wool over the toe to prevent rubbing   Follow up with your podiatrist on Thursday as scheduled  Continue with your vascular doctor  Follow up with your PCP  Go to the ED if symptoms worsen

## 2022-04-07 ENCOUNTER — APPOINTMENT (EMERGENCY)
Dept: RADIOLOGY | Facility: HOSPITAL | Age: 57
End: 2022-04-07
Payer: COMMERCIAL

## 2022-04-07 ENCOUNTER — HOSPITAL ENCOUNTER (EMERGENCY)
Facility: HOSPITAL | Age: 57
Discharge: HOME | End: 2022-04-07
Attending: FAMILY MEDICINE | Admitting: EMERGENCY MEDICINE
Payer: COMMERCIAL

## 2022-04-07 VITALS
TEMPERATURE: 97.5 F | OXYGEN SATURATION: 100 % | HEIGHT: 65 IN | HEART RATE: 78 BPM | DIASTOLIC BLOOD PRESSURE: 77 MMHG | WEIGHT: 173 LBS | RESPIRATION RATE: 16 BRPM | SYSTOLIC BLOOD PRESSURE: 165 MMHG | BODY MASS INDEX: 28.82 KG/M2

## 2022-04-07 DIAGNOSIS — Z86.79 HISTORY OF RAYNAUD'S SYNDROME: ICD-10-CM

## 2022-04-07 DIAGNOSIS — T69.1XXA CHILBLAINS, INITIAL ENCOUNTER: ICD-10-CM

## 2022-04-07 DIAGNOSIS — L03.032 CELLULITIS OF MIDDLE TOE, LEFT: Primary | ICD-10-CM

## 2022-04-07 PROCEDURE — 73630 X-RAY EXAM OF FOOT: CPT

## 2022-04-07 PROCEDURE — 99284 EMERGENCY DEPT VISIT MOD MDM: CPT | Performed by: PHYSICIAN ASSISTANT

## 2022-04-07 PROCEDURE — 99283 EMERGENCY DEPT VISIT LOW MDM: CPT

## 2022-04-07 RX ORDER — CEPHALEXIN 500 MG/1
500 CAPSULE ORAL EVERY 12 HOURS SCHEDULED
Qty: 14 CAPSULE | Refills: 0 | Status: SHIPPED | OUTPATIENT
Start: 2022-04-07 | End: 2022-04-14

## 2022-04-07 NOTE — ED PROVIDER NOTES
History  Chief Complaint   Patient presents with    Medical Problem     left foot middle toe color change  went from fire red to blue  has appointment at 3pm with podiatry  53-year-old female history of raynauds disease presents accompanied by her  complaining of left 3rd toe discoloration  She states that for the past few years she has been dealing with intermittent changes in the color of her fingers and toes  She states that her fingers and toes often turned white however they occasionally turn red  Patient reports being seen in urgent care few days ago with concern of erythema of her left 3rd toe  Patient reports having prior paronychia as with ingrown toenails that required excision previously at her podiatrist   She states that she had an appointment scheduled for her podiatrist at 3:00 p m  this afternoon however she canceled it to come to the ER with concern of her toe turning a bluish discoloration  She denies any pain at the affected area stating she has poor sensation in her bilateral feet that has been chronic for years  She denies any recent fevers or trauma to the affected area  Denies any recent puncture wounds, bites or any other complaints or concerns at this time  Prior to Admission Medications   Prescriptions Last Dose Informant Patient Reported? Taking?    Multiple Vitamins-Minerals (MULTIVITAMIN WITH MINERALS) tablet   Yes No   Sig: Take 1 tablet by mouth daily   Patient not taking: Reported on 4/5/2022    cephalexin (KEFLEX) 500 mg capsule   No No   Sig: Take 1 capsule (500 mg total) by mouth every 12 (twelve) hours for 7 days   cephalexin (KEFLEX) 500 mg capsule   No No   Sig: Take 1 capsule (500 mg total) by mouth every 12 (twelve) hours for 7 days   oxyCODONE-acetaminophen (Percocet) 5-325 mg per tablet   No No   Sig: Take 1 tablet by mouth every 6 (six) hours as needed for moderate pain Max Daily Amount: 4 tablets   Patient not taking: Reported on 4/5/2022 Facility-Administered Medications: None       Past Medical History:   Diagnosis Date    DVT of lower extremity (deep venous thrombosis) ([facility] [address] ) 2016    was on elequis x 3 months    Hyperlipidemia     Raynaud disease        Past Surgical History:   Procedure Laterality Date    HYSTERECTOMY      VARICOSE VEIN SURGERY Right        History reviewed  No pertinent family history  I have reviewed and agree with the history as documented  E-Cigarette/Vaping    E-Cigarette Use Never User      E-Cigarette/Vaping Substances    Nicotine No     THC No     CBD No     Flavoring No     Other No     Unknown No      Social History     Tobacco Use    Smoking status: Former Smoker     Types: Cigarettes     Quit date: 2001     Years since quittin 2    Smokeless tobacco: Never Used    Tobacco comment: quit 20 years ago   Vaping Use    Vaping Use: Never used   Substance Use Topics    Alcohol use: No    Drug use: No       Review of Systems   Constitutional: Negative for chills, fatigue and fever  HENT: Negative for ear pain and sore throat  Eyes: Negative for pain  Respiratory: Negative for cough, shortness of breath and wheezing  Cardiovascular: Negative for chest pain, palpitations and leg swelling  Gastrointestinal: Negative for abdominal pain, constipation, diarrhea, nausea and vomiting  Endocrine: Negative for polyuria  Genitourinary: Negative for dysuria and pelvic pain  Musculoskeletal: Negative for arthralgias, myalgias, neck pain and neck stiffness  Skin: Positive for color change  Negative for rash  Neurological: Negative for dizziness, syncope, light-headedness and headaches  All other systems reviewed and are negative  Physical Exam  Physical Exam  Constitutional:       General: She is not in acute distress  Appearance: Normal appearance  She is well-developed  HENT:      Head: Normocephalic and atraumatic        Right Ear: External ear normal  Left Ear: External ear normal       Mouth/Throat:      Pharynx: No oropharyngeal exudate  Eyes:      Extraocular Movements: Extraocular movements intact  Cardiovascular:      Rate and Rhythm: Normal rate and regular rhythm  Heart sounds: Normal heart sounds  Pulmonary:      Effort: Pulmonary effort is normal       Breath sounds: Normal breath sounds  Abdominal:      General: Bowel sounds are normal       Palpations: Abdomen is soft  Tenderness: There is no abdominal tenderness  Musculoskeletal:         General: Normal range of motion  Cervical back: Normal range of motion  Skin:     General: Skin is warm  Capillary Refill: Capillary refill takes less than 2 seconds  Findings: Erythema present  Comments: Erythema as seen in media  Area is nontender  No fluctuance  No palpable foreign body  Neurological:      General: No focal deficit present  Mental Status: She is alert and oriented to person, place, and time  Psychiatric:         Mood and Affect: Mood normal                      Vital Signs  ED Triage Vitals [04/07/22 1228]   Temperature Pulse Respirations Blood Pressure SpO2   97 5 °F (36 4 °C) 78 16 165/77 100 %      Temp Source Heart Rate Source Patient Position - Orthostatic VS BP Location FiO2 (%)   Tympanic Monitor Sitting Right arm --      Pain Score       No Pain           Vitals:    04/07/22 1228   BP: 165/77   Pulse: 78   Patient Position - Orthostatic VS: Sitting         Visual Acuity      ED Medications  Medications - No data to display    Diagnostic Studies  Results Reviewed     None                 XR foot 3+ views LEFT   ED Interpretation by Bel Montoya PA-C (04/07 1321)   No obvious signs of osteomyelitis or foreign body present                 Procedures  Procedures         ED Course  ED Course as of 04/07/22 1339   Thu Apr 07, 2022   1318 Discussed with Dr Kalina Mills of podiatry  Recommends keflex and outpatient follow up  MDM  Number of Diagnoses or Management Options  Cellulitis of middle toe, left  Diagnosis management comments: Patient presented with concerns of discoloration of her toe  Suspect likely cellulitis and Raynaud's as etiology of her symptoms  Discussed case with Podiatry as seen in ED course  Patient appears inadequately treated for cellulitis  Will increase Keflex does to q i d  dosing for 7 days with instructions to call her podiatrist and receive follow-up  No abscess or fluctuance appreciated on exam   Return precautions advised  All questions were answered and patient was agreeable to plan  Disposition  Final diagnoses:   Cellulitis of middle toe, left     Time reflects when diagnosis was documented in both MDM as applicable and the Disposition within this note     Time User Action Codes Description Comment    4/7/2022  1:21 PM Jenae Crane [W39 [id number]] Cellulitis of middle toe, left     4/7/2022  1:21 PM Jenae Crane [T69  1XXA] Chilblains, initial encounter left 3rd toe       4/7/2022  1:21 PM Jenae Crane [Z86 [id number]] History of Raynaud's syndrome       ED Disposition     ED Disposition Condition Date/Time Comment    Discharge Stable Thu Apr 7, 2022  1:21 PM Breana Mustafa discharge to home/self care  Follow-up Information    None         Current Discharge Medication List      CONTINUE these medications which have CHANGED    Details   cephalexin (KEFLEX) 500 mg capsule Take 1 capsule (500 mg total) by mouth every 12 (twelve) hours for 7 days  Qty: 14 capsule, Refills: 0    Associated Diagnoses: Chilblains, initial encounter;  History of Raynaud's syndrome         CONTINUE these medications which have NOT CHANGED    Details   Multiple Vitamins-Minerals (MULTIVITAMIN WITH MINERALS) tablet Take 1 tablet by mouth daily      oxyCODONE-acetaminophen (Percocet) 5-325 mg per tablet Take 1 tablet by mouth every 6 (six) hours as needed for moderate pain Max Daily Amount: 4 tablets  Qty: 10 tablet, Refills: 0    Associated Diagnoses: Rib fracture             No discharge procedures on file      PDMP Review       Value Time User    PDMP Reviewed  Yes 4/5/2022 11:16 AM Dami Diez          ED Provider  Electronically Signed by           Carmella Masterson PA-C  04/07/22 [id number]

## 2022-04-07 NOTE — DISCHARGE INSTRUCTIONS
Increase your dose of Keflex to 500 mg every 6 hours for 7 days for a total of 56 pills  Soak your toe in warm ideally salt water at least twice daily  Follow-up with your podiatrist   Return to the emergency department with any fevers or other significant change or worsening of your symptoms

## 2023-04-26 ENCOUNTER — OFFICE VISIT (OUTPATIENT)
Dept: FAMILY MEDICINE CLINIC | Facility: CLINIC | Age: 58
End: 2023-04-26

## 2023-04-26 VITALS
HEART RATE: 93 BPM | TEMPERATURE: 98.2 F | DIASTOLIC BLOOD PRESSURE: 104 MMHG | SYSTOLIC BLOOD PRESSURE: 192 MMHG | BODY MASS INDEX: 29.66 KG/M2 | HEIGHT: 65 IN | WEIGHT: 178 LBS | OXYGEN SATURATION: 98 %

## 2023-04-26 DIAGNOSIS — Z12.11 SCREENING FOR COLON CANCER: ICD-10-CM

## 2023-04-26 DIAGNOSIS — Z13.0 SCREENING FOR DEFICIENCY ANEMIA: ICD-10-CM

## 2023-04-26 DIAGNOSIS — Z13.29 SCREENING FOR THYROID DISORDER: ICD-10-CM

## 2023-04-26 DIAGNOSIS — Z12.31 ENCOUNTER FOR SCREENING MAMMOGRAM FOR MALIGNANT NEOPLASM OF BREAST: ICD-10-CM

## 2023-04-26 DIAGNOSIS — R00.2 PALPITATIONS: ICD-10-CM

## 2023-04-26 DIAGNOSIS — Z76.89 ENCOUNTER TO ESTABLISH CARE: Primary | ICD-10-CM

## 2023-04-26 DIAGNOSIS — Z11.59 NEED FOR HEPATITIS C SCREENING TEST: ICD-10-CM

## 2023-04-26 DIAGNOSIS — Z13.220 SCREENING FOR LIPID DISORDERS: ICD-10-CM

## 2023-04-26 DIAGNOSIS — I10 PRIMARY HYPERTENSION: ICD-10-CM

## 2023-04-26 DIAGNOSIS — Z13.1 SCREENING FOR DIABETES MELLITUS (DM): ICD-10-CM

## 2023-04-26 DIAGNOSIS — Z13.31 DEPRESSION SCREENING NEGATIVE: ICD-10-CM

## 2023-04-26 PROBLEM — K58.2 IRRITABLE BOWEL SYNDROME WITH BOTH CONSTIPATION AND DIARRHEA: Status: ACTIVE | Noted: 2020-10-19

## 2023-04-26 PROBLEM — K44.9 HIATAL HERNIA WITH GERD: Status: ACTIVE | Noted: 2022-08-24

## 2023-04-26 PROBLEM — K21.9 HIATAL HERNIA WITH GERD: Status: ACTIVE | Noted: 2022-08-24

## 2023-04-26 PROBLEM — M54.81 OCCIPITAL NEURALGIA OF RIGHT SIDE: Status: ACTIVE | Noted: 2019-02-05

## 2023-04-26 PROBLEM — D72.819 LEUKOPENIA: Status: ACTIVE | Noted: 2018-09-11

## 2023-04-26 PROBLEM — R13.19 ESOPHAGEAL DYSPHAGIA: Status: ACTIVE | Noted: 2018-09-11

## 2023-04-26 RX ORDER — VALSARTAN 80 MG/1
80 TABLET ORAL DAILY
Qty: 30 TABLET | Refills: 1 | Status: SHIPPED | OUTPATIENT
Start: 2023-04-26

## 2023-04-26 RX ORDER — VALSARTAN 80 MG/1
80 TABLET ORAL DAILY
Qty: 90 TABLET | Refills: 3 | Status: SHIPPED | OUTPATIENT
Start: 2023-04-26 | End: 2023-04-26 | Stop reason: SDUPTHER

## 2023-04-26 NOTE — PATIENT INSTRUCTIONS
Hypertension   AMBULATORY CARE:   Hypertension  is high blood pressure (BP)  Your BP is the force of your blood moving against the walls of your arteries  Normal BP is less than 120/80  Prehypertension is between 120/80 and 139/89  Hypertension is 140/90 or higher  Hypertension causes your BP to get so high that your heart has to work much harder than normal  This can damage your heart  You can control hypertension with a healthy lifestyle or medicines  A controlled blood pressure helps protect your organs, such as your heart, lungs, brain, and kidneys  Common symptoms include the following:   Headache     Blurred vision     Chest pain     Dizziness or weakness     Trouble breathing    Nosebleeds  Call 911 for any of the following: You have discomfort in your chest that feels like squeezing, pressure, fullness, or pain  You become confused or have difficulty speaking  You suddenly feel lightheaded or have trouble breathing  You have pain or discomfort in your back, neck, jaw, stomach, or arm  Seek care immediately if:   You have a severe headache or vision loss  You have weakness in an arm or leg  Contact your healthcare provider if:   You feel faint, dizzy, confused, or drowsy  You have been taking your BP medicine and your BP is still higher than your healthcare provider says it should be  You have questions or concerns about your condition or care  Treatment for hypertension  may include medicine to lower your BP and lower your cholesterol level  A low cholesterol level helps prevent heart disease and makes it easier to control your blood pressure  You may also need to make lifestyle changes  Take your medicine exactly as directed  Manage hypertension:  Talk with your healthcare provider about these and other ways to manage hypertension:  Check your BP at home  Sit and rest for 5 minutes before you take your BP  Extend your arm and support it on a flat surface   Your arm should be at the same level as your heart  Follow the directions that came with your BP monitor  If possible, take at least 2 BP readings each time  Take your BP at least twice a day at the same times each day, such as morning and evening  Keep a record of your BP readings and bring it to your follow-up visits  Ask your healthcare provider what your BP should be  Limit sodium (salt) as directed  Too much sodium can affect your fluid balance  Check labels to find low-sodium or no-salt-added foods  Some low-sodium foods use potassium salts for flavor  Too much potassium can also cause health problems  Your healthcare provider will tell you how much sodium and potassium are safe for you to have in a day  He or she may recommend that you limit sodium to 2,300 mg a day  Follow the meal plan recommended by your healthcare provider  A dietitian or your provider can give you more information on low-sodium plans or the DASH (Dietary Approaches to Stop Hypertension) eating plan  The DASH plan is low in sodium, unhealthy fats, and total fat  It is high in potassium, calcium, and fiber  Exercise to maintain a healthy weight  Exercise at least 30 minutes per day, on most days of the week  This will help decrease your blood pressure  Ask your healthcare provider about the best exercise plan for you  Decrease stress  This may help lower your BP  Learn ways to relax, such as deep breathing or listening to music  Limit alcohol  Women should limit alcohol to 1 drink a day  Men should limit alcohol to 2 drinks a day  A drink of alcohol is 12 ounces of beer, 5 ounces of wine, or 1½ ounces of liquor  Do not smoke  Nicotine and other chemicals in cigarettes and cigars can increase your BP and also cause lung damage  Ask your healthcare provider for information if you currently smoke and need help to quit  E-cigarettes or smokeless tobacco still contain nicotine   Talk to your healthcare provider before you use these products  Manage any other health conditions you have  Health conditions such as diabetes can increase your risk for hypertension  Follow your healthcare provider's instructions and take all your medicines as directed  Follow up with your healthcare provider as directed: You will need to return to have your BP checked and to have other lab tests done  Write down your questions so you remember to ask them during your visits  © 2017 [employer] Information is for End User's use only and may not be sold, redistributed or otherwise used for commercial purposes  All illustrations and images included in CareNotes® are the copyrighted property of A D A M [employer]  or Randy Maza  The above information is an  only  It is not intended as medical advice for individual conditions or treatments  Talk to your doctor, nurse or pharmacist before following any medical regimen to see if it is safe and effective for you  DASH Eating Plan   AMBULATORY CARE:   The DASH (Dietary Approaches to Stop Hypertension) Eating Plan  is designed to help prevent or lower high blood pressure  It can also help to lower LDL (bad) cholesterol and decrease your risk for heart disease  The plan is low in sodium, sugar, unhealthy fats, and total fat  It is high in potassium, calcium, magnesium, and fiber  These nutrients are added when you eat more fruits, vegetables, and whole grains  Your sodium limit each day: Your dietitian will tell you how much sodium is safe for you to have each day  People with high blood pressure should have no more than 1,500 to 2,300 mg of sodium in a day  A teaspoon (tsp) of salt has 2,300 mg of sodium  This may seem like a difficult goal, but small changes to the foods you eat can make a big difference  Your healthcare provider or dietitian can help you create a meal plan that follows your sodium limit  How to limit sodium:   Read food labels    Food labels can help you choose foods that are low in sodium  The amount of sodium is listed in milligrams (mg)  The % Daily Value (DV) column tells you how much of your daily needs are met by 1 serving of the food for each nutrient listed  Choose foods that have less than 5% of the DV of sodium  These foods are considered low in sodium  Foods that have 20% or more of the DV of sodium are considered high in sodium  Avoid foods that have more than 300 mg of sodium in each serving  Choose foods that say low-sodium, reduced-sodium, or no salt added on the food label  Avoid salt  Do not salt food at the table, and add very little salt to foods during cooking  Use herbs and spices, such as onions, garlic, and salt-free seasonings to add flavor to foods  Try lemon or lime juice or vinegar to give foods a tart flavor  Use hot peppers or a small amount of hot pepper sauce to add a spicy flavor to foods  Ask about salt substitutes  Ask your healthcare provider if you may use salt substitutes  Some salt substitutes have ingredients that can be harmful if you have certain health conditions  Choose foods carefully at restaurants  Meals from restaurants, especially fast food restaurants, are often high in sodium  Some restaurants have nutrition information that tells you the amount of sodium in their foods  Ask to have your food prepared with less, or no salt  What you need to know about fats:   Include healthy fats  Examples are unsaturated fats and omega-3 fatty acids  Unsaturated fats are found in soybean, canola, olive, or sunflower oil, and liquid and soft tub margarines  Omega-3 fatty acids are found in fatty fish, such as salmon, tuna, mackerel, and sardines  It is also found in flaxseed oil and ground flaxseed  Avoid unhealthy fats  Do not eat unhealthy fats, such as saturated fats and trans fats  Saturated fats are found in foods that contain fat from animals   Examples are fatty meats, whole milk, butter, cream, and other dairy foods  It is also found in shortening, stick margarine, palm oil, and coconut oil  Trans fats are found in fried foods, crackers, chips, and baked goods made with margarine or shortening  Foods to include: With the DASH eating plan, you need to eat a certain number of servings from each food group  This will help you get enough of certain nutrients and limit others  The amount of servings you should eat depends on how many calories you need  Your dietitian can tell you how many calories you need  The number of servings listed next to the food groups below are for people who need about 2,000 calories each day    Grains:  6 to 8 servings (3 of these servings should be whole-grain foods)    1 slice of whole-grain bread     1 ounce of dry cereal    ½ cup of cooked cereal, pasta, or brown rice    Vegetables and fruits:  4 to 5 servings of fruits and 4 to 5 servings of vegetables    1 medium fruit    ½ cup of frozen, canned (no added salt), or chopped fresh vegetables     ½ cup of fresh, frozen, dried, or canned fruit (canned in light syrup or fruit juice)    ½ cup of vegetable or fruit juice    Dairy:  2 to 3 servings    1 cup of nonfat (skim) or 1% milk    1½ ounces of fat-free or low-fat cheese    6 ounces of nonfat or low-fat yogurt    Lean meat, poultry, and fish:  6 ounces or less    Poultry (chicken, turkey) with no skin    Fish (especially fatty fish, such as salmon, fresh tuna, or mackerel)    Lean beef and pork (loin, round, extra lean hamburger)    Egg whites and egg substitutes    Nuts, seeds, and legumes:  4 to 5 servings each week    ½ cup of cooked beans and peas    1½ ounces of unsalted nuts    2 tablespoons of peanut butter or seeds    Sweets and added sugars:  5 or less each week    1 tablespoon of sugar, jelly, or jam    ½ cup of sorbet or gelatin    1 cup of lemonade    Fats:  2 to 3 servings each week    1 teaspoon of soft margarine or vegetable oil    1 tablespoon of [facility]    2 tablespoons of salad dressing  Foods to avoid:   Grains:      [employer], such as doughnuts, pastries, cookies, and biscuits (high in fat and sugar)    Mixes for cornbread and biscuits, packaged foods, such as bread stuffing, rice and pasta mixes, macaroni and cheese, and instant cereals (high in sodium)    Fruits and vegetables:      Regular, canned vegetables (high in sodium)    Sauerkraut, pickled vegetables, and other foods prepared in brine (high in sodium)    Fried vegetables or vegetables in butter or high-fat sauces    Fruit in cream or butter sauce (high in fat)    Dairy:      Whole milk, 2% milk, and cream (high in fat)    Regular cheese and processed cheese (high in fat and sodium)    Meats and protein foods:      Smoked or cured meat, such as corned beef, harkins, ham, hot dogs, and sausage (high in fat and sodium)    Canned beans and canned meats or spreads, such as potted meats, sardines, anchovies, and imitation seafood (high in sodium)    Deli or lunch meats, such as bologna, ham, turkey, and roast beef (high in sodium)    High-fat meat (T-bone steak, regular hamburger, and ribs)    Whole eggs and egg yolks (high in fat)    Other:      Seasonings made with salt, such as garlic salt, celery salt, onion salt, seasoned salt, meat tenderizers, and monosodium glutamate (MSG)    Miso soup and canned or dried soup mixes (high in sodium)    Regular soy sauce, barbecue sauce, teriyaki sauce, steak sauce, Worcestershire sauce, and most flavored vinegars (high in sodium)    Regular condiments, such as mustard, ketchup, and salad dressings (high in sodium)    Gravy and sauces, such as Paddy or cheese sauces (high in sodium and fat)    Drinks high in sugar, such as soda or fruit drinks    Snack foods, such as salted chips, popcorn, pretzels, pork rinds, salted crackers, and salted nuts    Frozen foods, such as dinners, entrees, vegetables with sauces, and breaded meats (high in sodium)  Other guidelines to follow:   Maintain a healthy weight  Your risk for heart disease is higher if you are overweight  Your healthcare provider may suggest that you lose weight if you are overweight  You can lose weight by eating fewer calories and foods that have added sugars and fat  The DASH meal plan can help you do this  Decrease calories by eating smaller portions at each meal and fewer snacks  Ask your healthcare provider for more information about how to lose weight  Exercise regularly  Regular exercise can help you reach or maintain a healthy weight  Regular exercise can also help decrease your blood pressure and improve your cholesterol levels  Get 30 minutes or more of moderate exercise each day of the week  To lose weight, get at least 60 minutes of exercise  Talk to your healthcare provider about the best exercise program for you  Limit alcohol  Women should limit alcohol to 1 drink a day  Men should limit alcohol to 2 drinks a day  A drink of alcohol is 12 ounces of beer, 5 ounces of wine, or 1½ ounces of liquor  © 2017 [facility] [facility] Information is for End User's use only and may not be sold, redistributed or otherwise used for commercial purposes  All illustrations and images included in CareNotes® are the copyrighted property of [employer]  or Randy Maza  The above information is an  only  It is not intended as medical advice for individual conditions or treatments  Talk to your doctor, nurse or pharmacist before following any medical regimen to see if it is safe and effective for you

## 2023-04-26 NOTE — PROGRESS NOTES
Assessment/Plan:    Problem List Items Addressed This Visit     Palpitations    Primary hypertension    Relevant Medications    valsartan (DIOVAN) 80 mg tablet   Other Visit Diagnoses     Encounter to establish care    -  Primary    Screening for deficiency anemia        Relevant Orders    CBC and differential    Screening for diabetes mellitus (DM)        Relevant Orders    Comprehensive metabolic panel    Screening for lipid disorders        Relevant Orders    Lipid Panel with Direct LDL reflex    Screening for thyroid disorder        Relevant Orders    TSH, 3rd generation with Free T4 reflex    Encounter for screening mammogram for malignant neoplasm of breast        Relevant Orders    Mammo screening bilateral w 3d & cad    Screening for colon cancer        Need for hepatitis C screening test        Relevant Orders    Hepatitis C Antibody    Depression screening negative               Diagnoses and all orders for this visit:    Encounter to establish care    Screening for deficiency anemia  -     CBC and differential; Future    Screening for diabetes mellitus (DM)  -     Comprehensive metabolic panel; Future    Screening for lipid disorders  -     Lipid Panel with Direct LDL reflex; Future    Screening for thyroid disorder  -     TSH, 3rd generation with Free T4 reflex; Future    Encounter for screening mammogram for malignant neoplasm of breast  -     Mammo screening bilateral w 3d & cad; Future    Screening for colon cancer    Primary hypertension  -     Discontinue: valsartan (DIOVAN) 80 mg tablet; Take 1 tablet (80 mg total) by mouth daily  -     valsartan (DIOVAN) 80 mg tablet; Take 1 tablet (80 mg total) by mouth daily    Need for hepatitis C screening test  -     Hepatitis C Antibody;  Future    Palpitations  Comments:  historical EKG/holter monitor have shown ectopic beats and PVCs    Depression screening negative        No problem-specific Assessment & Plan notes found for this encounter  Subjective:      Patient ID: Laura Ramos is a 56 y o  female  NP with PMHx sleep disturbance, palpitation, hyperlipidemia, and IBS with both constipation and diarrhea presents to establish care  Patient is taking her blood pressure at home all of the blood pressures have been elevated above 343 systolic  Patient also states that she notes dizziness and AM headaches  Hypertension  This is a new problem  The problem is uncontrolled  Associated symptoms include headaches  Pertinent negatives include no anxiety, blurred vision, chest pain, neck pain, orthopnea, palpitations, peripheral edema, PND or shortness of breath  The following portions of the patient's history were reviewed and updated as appropriate:   She has a past medical history of [facility] lower [facility] (06/01/2016), GERD (gastroesophageal reflux disease) (2016), Hyperlipidemia, Hypertension (2017), Inflammatory bowel disease (2022), Kidney stone, and Raynaud disease  ,  does not have any pertinent problems on file  ,   has a past surgical history that includes Hysterectomy (1997) and Varicose vein surgery (Right)  ,  family history includes Hypertension in her father, maternal grandmother, and mother  ,   reports that she quit smoking about 22 years ago  Her smoking use included cigarettes  She started smoking about 39 years ago  She has a 20 00 pack-year smoking history  She has never used smokeless tobacco  She reports that she does not drink alcohol and does not use drugs  ,  is allergic to metronidazole     Current Outpatient Medications   Medication Sig Dispense Refill   • Multiple Vitamins-Minerals (MULTIVITAMIN WITH MINERALS) tablet Take 1 tablet by mouth daily     • Omega-3 Fatty Acids (fish oil) 1,000 mg Take 2 g by mouth 2 (two) times a day     • valsartan (DIOVAN) 80 mg tablet Take 1 tablet (80 mg total) by mouth daily 30 tablet 1   • omeprazole (PriLOSEC) 20 mg delayed release capsule " (Patient not taking: Reported on 4/26/2023)       No current facility-administered medications for this visit  BMI Counseling: Body mass index is 29 62 kg/m²  The BMI is above normal  Nutrition recommendations include decreasing portion sizes, encouraging healthy choices of fruits and vegetables, decreasing fast food intake, consuming healthier snacks, limiting drinks that contain sugar, moderation in carbohydrate intake, increasing intake of lean protein, reducing intake of saturated and trans fat and reducing intake of cholesterol  Rationale for BMI follow-up plan is due to patient being overweight or obese  Depression Screening and Follow-up Plan: Patient was screened for depression during today's encounter  They screened negative with a PHQ-2 score of 0  Review of Systems   Eyes: Negative for blurred vision  Respiratory: Negative for shortness of breath  Cardiovascular: Negative for chest pain, palpitations, orthopnea and PND  Musculoskeletal: Negative for neck pain  Neurological: Positive for dizziness and headaches  All other systems reviewed and are negative  Objective:  Vitals:    04/26/23 1401   BP: (!) 192/104   BP Location: Left arm   Patient Position: Sitting   Cuff Size: Adult   Pulse: 93   Temp: 98 2 °F (36 8 °C)   TempSrc: Tympanic   SpO2: 98%   Weight: 80 7 kg (178 lb)   Height: 5' 5\" (1 651 m)     Body mass index is 29 62 kg/m²  Physical Exam  Vitals and nursing note reviewed  Constitutional:       Appearance: Normal appearance  She is well-developed  HENT:      Head: Normocephalic and atraumatic  Right Ear: Tympanic membrane, ear canal and external ear normal       Left Ear: Tympanic membrane, ear canal and external ear normal       Nose: Nose normal       Mouth/Throat:      Mouth: Mucous membranes are moist       Pharynx: Uvula midline     Eyes:      General: Lids are normal       Conjunctiva/sclera: Conjunctivae normal       Pupils: Pupils are " equal, round, and reactive to light  Neck:      Thyroid: No thyroid mass or thyromegaly  Vascular: No JVD  Trachea: Trachea and phonation normal    Cardiovascular:      Rate and Rhythm: Normal rate and regular rhythm  Pulses: Normal pulses  Heart sounds: Normal heart sounds, S1 normal and S2 normal  No murmur heard  No friction rub  No gallop  Pulmonary:      Effort: Pulmonary effort is normal       Breath sounds: Normal breath sounds  Abdominal:      General: Bowel sounds are normal       Palpations: Abdomen is soft  Tenderness: There is no abdominal tenderness  Genitourinary:     Comments: Deferred   Musculoskeletal:         General: Normal range of motion  Cervical back: Full passive range of motion without pain, normal range of motion and neck supple  Right lower leg: No edema  Left lower leg: No edema  Lymphadenopathy:      Head:      Right side of head: No submental, submandibular, tonsillar, preauricular, posterior auricular or occipital adenopathy  Left side of head: No submental, submandibular, tonsillar, preauricular, posterior auricular or occipital adenopathy  Cervical: No cervical adenopathy  Skin:     General: Skin is warm and dry  Capillary Refill: Capillary refill takes less than 2 seconds  Neurological:      General: No focal deficit present  Mental Status: She is alert and oriented to person, place, and time  Cranial Nerves: No cranial nerve deficit  Sensory: Sensation is intact  Motor: Motor function is intact  Coordination: Coordination is intact  Gait: Gait is intact  Deep Tendon Reflexes: Reflexes are normal and symmetric  Psychiatric:         Attention and Perception: Attention and perception normal          Mood and Affect: Mood and affect normal          Speech: Speech normal          Behavior: Behavior normal  Behavior is cooperative  Thought Content:  Thought content normal  "       Cognition and Memory: Cognition normal          Judgment: Judgment normal            Portions of the record may have been created with voice recognition software  Occasional wrong word or \"sound a like\" substitutions may have occurred due to the inherent limitations of voice recognition software  Read the chart carefully and recognize, using context, where substitutions have occurred  Contact me with any questions    "

## 2023-04-27 ENCOUNTER — HOSPITAL ENCOUNTER (EMERGENCY)
Facility: HOSPITAL | Age: 58
Discharge: HOME | End: 2023-04-27
Attending: FAMILY MEDICINE

## 2023-04-27 ENCOUNTER — TELEPHONE (OUTPATIENT)
Dept: ADMINISTRATIVE | Facility: OTHER | Age: 58
End: 2023-04-27

## 2023-04-27 VITALS
OXYGEN SATURATION: 98 % | RESPIRATION RATE: 21 BRPM | SYSTOLIC BLOOD PRESSURE: 136 MMHG | DIASTOLIC BLOOD PRESSURE: 63 MMHG | TEMPERATURE: 97.8 F | HEART RATE: 73 BPM

## 2023-04-27 DIAGNOSIS — I10 HYPERTENSION: Primary | ICD-10-CM

## 2023-04-27 LAB
ANION GAP SERPL CALCULATED.3IONS-SCNC: 10 MMOL/L (ref 4–13)
BASOPHILS # BLD AUTO: 0.04 THOUSANDS/ΜL (ref 0–0.1)
BASOPHILS NFR BLD AUTO: 1 % (ref 0–1)
BUN SERPL-MCNC: 12 MG/DL (ref 5–25)
CALCIUM SERPL-MCNC: 9.7 MG/DL (ref 8.4–10.2)
CARDIAC TROPONIN I PNL SERPL HS: <2 NG/L
CHLORIDE SERPL-SCNC: 104 MMOL/L (ref 96–108)
CO2 SERPL-SCNC: 27 MMOL/L (ref 21–32)
CREAT SERPL-MCNC: 0.76 MG/DL (ref 0.6–1.3)
EOSINOPHIL # BLD AUTO: 0.15 THOUSAND/ΜL (ref 0–0.61)
EOSINOPHIL NFR BLD AUTO: 3 % (ref 0–6)
ERYTHROCYTE [DISTWIDTH] IN BLOOD BY AUTOMATED COUNT: 12.3 % (ref 11.6–15.1)
GFR SERPL CREATININE-BSD FRML MDRD: 88 ML/MIN/1.73SQ M
GLUCOSE SERPL-MCNC: 138 MG/DL (ref 65–140)
HCT VFR BLD AUTO: 43.3 % (ref 34.8–46.1)
HGB BLD-MCNC: 14.2 G/DL (ref 11.5–15.4)
IMM GRANULOCYTES # BLD AUTO: 0.01 THOUSAND/UL (ref 0–0.2)
IMM GRANULOCYTES NFR BLD AUTO: 0 % (ref 0–2)
LYMPHOCYTES # BLD AUTO: 1.35 THOUSANDS/ΜL (ref 0.6–4.47)
LYMPHOCYTES NFR BLD AUTO: 28 % (ref 14–44)
MCH RBC QN AUTO: 31.6 PG (ref 26.8–34.3)
MCHC RBC AUTO-ENTMCNC: 32.8 G/DL (ref 31.4–37.4)
MCV RBC AUTO: 96 FL (ref 82–98)
MONOCYTES # BLD AUTO: 0.31 THOUSAND/ΜL (ref 0.17–1.22)
MONOCYTES NFR BLD AUTO: 6 % (ref 4–12)
NEUTROPHILS # BLD AUTO: 3.04 THOUSANDS/ΜL (ref 1.85–7.62)
NEUTS SEG NFR BLD AUTO: 62 % (ref 43–75)
NRBC BLD AUTO-RTO: 0 /100 WBCS
PLATELET # BLD AUTO: 260 THOUSANDS/UL (ref 149–390)
PMV BLD AUTO: 10.9 FL (ref 8.9–12.7)
POTASSIUM SERPL-SCNC: 3.1 MMOL/L (ref 3.5–5.3)
RBC # BLD AUTO: 4.49 MILLION/UL (ref 3.81–5.12)
SODIUM SERPL-SCNC: 141 MMOL/L (ref 135–147)
WBC # BLD AUTO: 4.9 THOUSAND/UL (ref 4.31–10.16)

## 2023-04-27 RX ORDER — POTASSIUM CHLORIDE 20 MEQ/1
40 TABLET, EXTENDED RELEASE ORAL ONCE
Status: COMPLETED | OUTPATIENT
Start: 2023-04-27 | End: 2023-04-27

## 2023-04-27 RX ADMIN — POTASSIUM CHLORIDE 40 MEQ: 1500 TABLET, EXTENDED RELEASE ORAL at 17:16

## 2023-04-27 NOTE — TELEPHONE ENCOUNTER
Upon review of the In Basket request we were able to locate, review, and update the patient chart as requested for Mammogram     Any additional questions or concerns should be emailed to the Practice Liaisons via the appropriate education email address, please do not reply via In Basket      Thank you  Yesenia Redmond

## 2023-04-27 NOTE — TELEPHONE ENCOUNTER
----- Message from Aravind Johnson sent at 4/26/2023  2:05 PM EDT -----  Regarding: mammogram  04/26/23 2:05 PM    Hello, our patient Maia Urena has had Mammogram completed/performed  Please assist in updating the patient chart by pulling the Care Everywhere (CE) document  The date of service is 4/28/22       Thank you,  Nika Jenkins  [facility]

## 2023-04-27 NOTE — TELEPHONE ENCOUNTER
Upon review of the In Basket request we were able to locate, review, and update the patient chart as requested for CRC: Colonoscopy  Any additional questions or concerns should be emailed to the Practice Liaisons via the appropriate education email address, please do not reply via In Basket      Thank you  Nannette Oshea

## 2023-04-27 NOTE — ED NOTES
Pt ambulated to the BR denies dizziness / lightheaded or any other symptoms  Kathie Santos, [address] [facility]  04/27/23 9215

## 2023-04-27 NOTE — ED PROVIDER NOTES
History  Chief Complaint   Patient presents with   • Hypertension     Ems reports pt has been having an ongoing issue with hypertension  Ems states pressure was  130/58  States she also has been having episodes of dizziness     HPI  Is a 53-year-old female presented with complaint of elevated blood pressure  Patient states that she was recently started on valsartan for her blood pressure which has been high for past couple weeks  She states that she took the first dose yesterday at 3 PM felt better and then took the second 1 around 11:00 this morning  Patient states that she was at work and was having some episode of dizziness  She denies any chest pain or shortness of breath denies abdominal pain nausea vomiting or diarrhea  Patient is otherwise stable sitting comfortably in bed answer question appropriately  Prior to Admission Medications   Prescriptions Last Dose Informant Patient Reported? Taking? Multiple Vitamins-Minerals (MULTIVITAMIN WITH MINERALS) tablet   Yes No   Sig: Take 1 tablet by mouth daily   Omega-3 Fatty Acids (fish oil) 1,000 mg   Yes No   Sig: Take 2 g by mouth 2 (two) times a day   omeprazole (PriLOSEC) 20 mg delayed release capsule   Yes No   Patient not taking: Reported on 4/26/2023   valsartan (DIOVAN) 80 mg tablet   No No   Sig: Take 1 tablet (80 mg total) by mouth daily      Facility-Administered Medications: None       Past Medical History:   Diagnosis Date   • D[facility] of lower extremity (deep venous thrombosis) ([facility] ) 06/01/2016    was on elequis x 3 months   • GERD (gastroesophageal reflux disease) 2016    Endoscopy dilation 8/2/22   • Hyperlipidemia    • Hypertension 2017    Dizziness and hard to walk steady   • Inflammatory bowel disease 2022    Colonoscopy 9/15/22   microscopic colitis   • Kidney stone     2mm stone found on left side   fluoroscopy 1/2/23   • Raynaud disease        Past Surgical History:   Procedure Laterality Date   • HYSTERECTOMY  1997   • VARICOSE VEIN SURGERY Right        Family History   Problem Relation Age of Onset   • Hypertension Mother    • Hypertension Father    • Hypertension Maternal Grandmother      I have reviewed and agree with the history as documented  E-Cigarette/Vaping   • E-Cigarette Use Never User      E-Cigarette/Vaping Substances   • Nicotine No    • THC No    • CBD No    • Flavoring No    • Other No    • Unknown No      Social History     Tobacco Use   • Smoking status: Former     Packs/day: 1 00     Years: 20 00     Pack years: 20 00     Types: Cigarettes     Start date: 1984     Quit date: 2001     Years since quittin 3   • Smokeless tobacco: Never   Vaping Use   • Vaping Use: Never used   Substance Use Topics   • Alcohol use: No   • Drug use: No       Review of Systems   Constitutional: Negative for chills and fever  HENT: Negative for rhinorrhea and sore throat  Eyes: Negative for visual disturbance  Respiratory: Negative for cough and shortness of breath  Cardiovascular: Negative for chest pain and leg swelling  Gastrointestinal: Negative for abdominal pain, diarrhea, nausea and vomiting  Genitourinary: Negative for dysuria  Musculoskeletal: Negative for back pain and myalgias  Skin: Negative for rash  Neurological: Positive for dizziness  Negative for headaches  Psychiatric/Behavioral: Negative for confusion  All other systems reviewed and are negative  Physical Exam  Physical Exam  Vitals and nursing note reviewed  Constitutional:       Appearance: She is well-developed  HENT:      Head: Normocephalic and atraumatic  Right Ear: External ear normal       Left Ear: External ear normal       Nose: Nose normal       Mouth/Throat:      Mouth: Mucous membranes are moist       Pharynx: No oropharyngeal exudate  Eyes:      General: No scleral icterus  Right eye: No discharge  Left eye: No discharge        Conjunctiva/sclera: Conjunctivae normal       Pupils: Pupils are equal, round, and reactive to light  Cardiovascular:      Rate and Rhythm: Normal rate and regular rhythm  Pulses: Normal pulses  Heart sounds: Normal heart sounds  Pulmonary:      Effort: Pulmonary effort is normal  No respiratory distress  Breath sounds: Normal breath sounds  No wheezing  Abdominal:      General: Bowel sounds are normal       Palpations: Abdomen is soft  Musculoskeletal:         General: Normal range of motion  Cervical back: Normal range of motion and neck supple  Lymphadenopathy:      Cervical: No cervical adenopathy  Skin:     General: Skin is warm and dry  Capillary Refill: Capillary refill takes less than 2 seconds  Neurological:      General: No focal deficit present  Mental Status: She is alert and oriented to person, place, and time     Psychiatric:         Mood and Affect: Mood normal          Behavior: Behavior normal          Vital Signs  ED Triage Vitals [04/27/23 1617]   Temperature Pulse Respirations Blood Pressure SpO2   97 8 °F (36 6 °C) 90 17 161/77 98 %      Temp Source Heart Rate Source Patient Position - Orthostatic VS BP Location FiO2 (%)   Temporal Monitor Lying Left arm --      Pain Score       5           Vitals:    04/27/23 1617 04/27/23 1630 04/27/23 1700 04/27/23 1730   BP: 161/77 149/70 126/60 136/63   Pulse: 90 84 78 73   Patient Position - Orthostatic VS: Lying Lying Lying Lying         Visual Acuity  Visual Acuity    Flowsheet Row Most Recent Value   L Pupil Size (mm) 3   R Pupil Size (mm) 3          ED Medications  Medications   potassium chloride (K-DUR,KLOR-CON) CR tablet 40 mEq (40 mEq Oral Given 4/27/23 1716)       Diagnostic Studies  Results Reviewed     Procedure Component Value Units Date/Time    HS Troponin 0hr (reflex protocol) [[id number]]  (Normal) Collected: 04/27/23 1628    Lab Status: Final result Specimen: Blood from Arm, Left Updated: 04/27/23 1658     hs TnI 0hr <2 ng/L     HS Troponin I 2hr [[id number]]     Lab Status: No result Specimen: Blood     Basic metabolic panel [[id number]]  (Abnormal) Collected: 04/27/23 1628    Lab Status: Final result Specimen: Blood from Arm, Left Updated: 04/27/23 1650     Sodium 141 mmol/L      Potassium 3 1 mmol/L      Chloride 104 mmol/L      CO2 27 mmol/L      ANION GAP 10 mmol/L      BUN 12 mg/dL      Creatinine 0 76 mg/dL      Glucose 138 mg/dL      Calcium 9 7 mg/dL      eGFR 88 ml/min/1 73sq m     Narrative:      [place] guidelines for Chronic Kidney Disease (CKD):   •  Stage 1 with normal or high GFR (GFR > 90 mL/min/1 73 square meters)  •  Stage 2 Mild CKD (GFR = 60-89 mL/min/1 73 square meters)  •  Stage 3A Moderate CKD (GFR = 45-59 mL/min/1 73 square meters)  •  Stage 3B Moderate CKD (GFR = 30-44 mL/min/1 73 square meters)  •  Stage 4 Severe CKD (GFR = 15-29 mL/min/1 73 square meters)  •  Stage 5 End Stage CKD (GFR <15 mL/min/1 73 square meters)  Note: GFR calculation is accurate only with a steady state creatinine    CBC and differential [[id number]] Collected: 04/27/23 1628    Lab Status: Final result Specimen: Blood from Arm, Left Updated: 04/27/23 1638     WBC 4 90 Thousand/uL      RBC 4 49 Million/uL      Hemoglobin 14 2 g/dL      Hematocrit 43 3 %      MCV 96 fL      MCH 31 6 pg      MCHC 32 8 g/dL      RDW 12 3 %      MPV 10 9 fL      Platelets 835 Thousands/uL      nRBC 0 /100 WBCs      Neutrophils Relative 62 %      Immat GRANS % 0 %      Lymphocytes Relative 28 %      Monocytes Relative 6 %      Eosinophils Relative 3 %      Basophils Relative 1 %      Neutrophils Absolute 3 04 Thousands/µL      Immature Grans Absolute 0 01 Thousand/uL      Lymphocytes Absolute 1 35 Thousands/µL      Monocytes Absolute 0 31 Thousand/µL      Eosinophils Absolute 0 15 Thousand/µL      Basophils Absolute 0 04 Thousands/µL                  No orders to display              Procedures  Procedures         ED Course  ED Course as of 04/27/23 1802   Thu Apr 27, 2023   1711 Blood Pressure: 149/70  Pt blood pressure improved    1712 hs TnI 0hr: <2  Negative troponin                                SBIRT 20yo+    Flowsheet Row Most Recent Value   Initial Alcohol Screen: US AUDIT-C     1  How often do you have a drink containing alcohol? 0 Filed at: 04/27/2023 1617   2  How many drinks containing alcohol do you have on a typical day you are drinking? 0 Filed at: 04/27/2023 1617   3a  Male UNDER 65: How often do you have five or more drinks on one occasion? 0 Filed at: 04/27/2023 1617   3b  FEMALE Any Age, or MALE 65+: How often do you have 4 or more drinks on one occassion? 0 Filed at: 04/27/2023 1617   Audit-C Score 0 Filed at: 04/27/2023 1617   DYAN: How many times in the past year have you    Used an illegal drug or used a prescription medication for non-medical reasons? Never Filed at: 04/27/2023 1617                    Medical Decision Making  56-year-old female recently started on a high blood pressure medication presented to ED with the complaint of feeling dizzy and elevated blood pressure  Patient states that she took 2 doses so far  Will obtain labs she also complained of left arm pain will obtain EKG  Differential diagnoses include: Hypertensive emergency/hypertensive urgency/renal artery stenosis/medication reaction/MI  EKG within normal limits troponin negative  Patient blood pressure improved to she was observed in the ED  Vitals are stable  Blood work within normal limits troponin negative  Patient states she is feeling much better denies any dizziness blurry vision double vision denies chest pain or shortness of breath  She is requesting to be discharged home at this time I had a detailed discussion with patient and recommending that patient should continue to observe her blood pressure at home if noticed recurrence of these symptoms return back to the ED  Otherwise follow-up with PCP  Patient verbalized understand the plan discharge home      Amount and/or Complexity of Data Reviewed  Labs: ordered  Decision-making details documented in ED Course  Risk  Prescription drug management  Disposition  Final diagnoses:   Hypertension     Time reflects when diagnosis was documented in both MDM as applicable and the Disposition within this note     Time User Action Codes Description Comment    4/27/2023  5:56 PM [email or web address] Hypertension       ED Disposition     ED Disposition   Discharge    Condition   Stable    Date/Time   Thu Apr 27, 2023  5:56 PM    Comment   Elfredia Saver discharge to home/self care  Follow-up Information     Follow up With Specialties Details Why Lencho Mosquera, [facility] Internal [facility], Nurse Practitioner Schedule an appointment as soon as possible for a visit in 2 days If symptoms worsen [address]   [phone]            Discharge Medication List as of 4/27/2023  5:56 PM      CONTINUE these medications which have NOT CHANGED    Details   Multiple Vitamins-Minerals (MULTIVITAMIN WITH MINERALS) tablet Take 1 tablet by mouth daily, Historical Med      Omega-3 Fatty Acids (fish oil) 1,000 mg Take 2 g by mouth 2 (two) times a day, Historical Med      omeprazole (PriLOSEC) 20 mg delayed release capsule Starting Sat 2/11/2023, Historical Med      valsartan (DIOVAN) 80 mg tablet Take 1 tablet (80 mg total) by mouth daily, Starting Wed 4/26/2023, Normal             No discharge procedures on file      PDMP Review       Value Time User    PDMP Reviewed  Yes 4/5/2022 11:16 AM Gómez Welch [facility]          ED Provider  Electronically Signed by           Brenda Omer MD  04/27/23 [id number]

## 2023-04-27 NOTE — TELEPHONE ENCOUNTER
----- Message from Dusty Ramirez sent at 4/26/2023  2:06 PM EDT -----  Regarding: colonoscopy  04/26/23 2:06 PM    Hello, our patient Symone Kaiser has had CRC: Colonoscopy completed/performed  Please assist in updating the patient chart by pulling the Care Everywhere (CE) document  The date of service is 9/15/22       Thank you,  Nika Jenkins  [facility] PRIMARY [facility]

## 2023-04-28 LAB
ATRIAL RATE: 90 BPM
P AXIS: 86 DEGREES
PR INTERVAL: 160 MS
QRS AXIS: 23 DEGREES
QRSD INTERVAL: 92 MS
QT INTERVAL: 412 MS
QTC INTERVAL: 504 MS
T WAVE AXIS: 86 DEGREES
VENTRICULAR RATE: 90 BPM

## 2023-05-22 ENCOUNTER — APPOINTMENT (OUTPATIENT)
Dept: LAB | Facility: CLINIC | Age: 58
End: 2023-05-22

## 2023-05-22 DIAGNOSIS — Z13.220 SCREENING FOR LIPID DISORDERS: ICD-10-CM

## 2023-05-22 DIAGNOSIS — Z13.29 SCREENING FOR THYROID DISORDER: ICD-10-CM

## 2023-05-22 DIAGNOSIS — Z13.0 SCREENING FOR DEFICIENCY ANEMIA: ICD-10-CM

## 2023-05-22 DIAGNOSIS — Z11.59 NEED FOR HEPATITIS C SCREENING TEST: ICD-10-CM

## 2023-05-22 DIAGNOSIS — Z13.1 SCREENING FOR DIABETES MELLITUS (DM): ICD-10-CM

## 2023-05-22 LAB
ALBUMIN SERPL BCP-MCNC: 3.7 G/DL (ref 3.5–5)
ALP SERPL-CCNC: 61 U/L (ref 46–116)
ALT SERPL W P-5'-P-CCNC: 30 U/L (ref 12–78)
ANION GAP SERPL CALCULATED.3IONS-SCNC: 1 MMOL/L (ref 4–13)
AST SERPL W P-5'-P-CCNC: 18 U/L (ref 5–45)
BASOPHILS # BLD AUTO: 0.05 THOUSANDS/ÂΜL (ref 0–0.1)
BASOPHILS NFR BLD AUTO: 1 % (ref 0–1)
BILIRUB SERPL-MCNC: 0.59 MG/DL (ref 0.2–1)
BUN SERPL-MCNC: 14 MG/DL (ref 5–25)
CALCIUM SERPL-MCNC: 9.4 MG/DL (ref 8.3–10.1)
CHLORIDE SERPL-SCNC: 110 MMOL/L (ref 96–108)
CHOLEST SERPL-MCNC: 213 MG/DL
CO2 SERPL-SCNC: 27 MMOL/L (ref 21–32)
CREAT SERPL-MCNC: 0.82 MG/DL (ref 0.6–1.3)
EOSINOPHIL # BLD AUTO: 0.16 THOUSAND/ÂΜL (ref 0–0.61)
EOSINOPHIL NFR BLD AUTO: 4 % (ref 0–6)
ERYTHROCYTE [DISTWIDTH] IN BLOOD BY AUTOMATED COUNT: 12.7 % (ref 11.6–15.1)
GFR SERPL CREATININE-BSD FRML MDRD: 80 ML/MIN/1.73SQ M
GLUCOSE P FAST SERPL-MCNC: 77 MG/DL (ref 65–99)
HCT VFR BLD AUTO: 42.8 % (ref 34.8–46.1)
HCV AB SER QL: NORMAL
HDLC SERPL-MCNC: 82 MG/DL
HGB BLD-MCNC: 13.4 G/DL (ref 11.5–15.4)
IMM GRANULOCYTES # BLD AUTO: 0 THOUSAND/UL (ref 0–0.2)
IMM GRANULOCYTES NFR BLD AUTO: 0 % (ref 0–2)
LDLC SERPL CALC-MCNC: 119 MG/DL (ref 0–100)
LYMPHOCYTES # BLD AUTO: 1.31 THOUSANDS/ÂΜL (ref 0.6–4.47)
LYMPHOCYTES NFR BLD AUTO: 35 % (ref 14–44)
MCH RBC QN AUTO: 30.2 PG (ref 26.8–34.3)
MCHC RBC AUTO-ENTMCNC: 31.3 G/DL (ref 31.4–37.4)
MCV RBC AUTO: 96 FL (ref 82–98)
MONOCYTES # BLD AUTO: 0.35 THOUSAND/ÂΜL (ref 0.17–1.22)
MONOCYTES NFR BLD AUTO: 9 % (ref 4–12)
NEUTROPHILS # BLD AUTO: 1.87 THOUSANDS/ÂΜL (ref 1.85–7.62)
NEUTS SEG NFR BLD AUTO: 51 % (ref 43–75)
NRBC BLD AUTO-RTO: 0 /100 WBCS
PLATELET # BLD AUTO: 269 THOUSANDS/UL (ref 149–390)
PMV BLD AUTO: 11.2 FL (ref 8.9–12.7)
POTASSIUM SERPL-SCNC: 4.2 MMOL/L (ref 3.5–5.3)
PROT SERPL-MCNC: 7.1 G/DL (ref 6.4–8.4)
RBC # BLD AUTO: 4.44 MILLION/UL (ref 3.81–5.12)
SODIUM SERPL-SCNC: 138 MMOL/L (ref 135–147)
TRIGL SERPL-MCNC: 62 MG/DL
TSH SERPL DL<=0.05 MIU/L-ACNC: 1.67 UIU/ML (ref 0.45–4.5)
WBC # BLD AUTO: 3.74 THOUSAND/UL (ref 4.31–10.16)

## 2023-05-31 ENCOUNTER — OFFICE VISIT (OUTPATIENT)
Dept: FAMILY MEDICINE CLINIC | Facility: CLINIC | Age: 58
End: 2023-05-31

## 2023-05-31 VITALS
TEMPERATURE: 97.5 F | OXYGEN SATURATION: 98 % | WEIGHT: 179 LBS | DIASTOLIC BLOOD PRESSURE: 80 MMHG | SYSTOLIC BLOOD PRESSURE: 140 MMHG | HEART RATE: 73 BPM | BODY MASS INDEX: 29.82 KG/M2 | HEIGHT: 65 IN

## 2023-05-31 DIAGNOSIS — F51.01 PRIMARY INSOMNIA: Primary | ICD-10-CM

## 2023-05-31 DIAGNOSIS — R13.19 ESOPHAGEAL DYSPHAGIA: ICD-10-CM

## 2023-05-31 DIAGNOSIS — K21.9 HIATAL HERNIA WITH GERD: ICD-10-CM

## 2023-05-31 DIAGNOSIS — K44.9 HIATAL HERNIA WITH GERD: ICD-10-CM

## 2023-05-31 DIAGNOSIS — K58.2 IRRITABLE BOWEL SYNDROME WITH BOTH CONSTIPATION AND DIARRHEA: ICD-10-CM

## 2023-05-31 RX ORDER — DIPHENHYDRAMINE HCL 50 MG
50 CAPSULE ORAL EVERY 6 HOURS PRN
COMMUNITY

## 2023-05-31 RX ORDER — TRAZODONE HYDROCHLORIDE 50 MG/1
50 TABLET ORAL
Qty: 30 TABLET | Refills: 0 | Status: SHIPPED | OUTPATIENT
Start: 2023-05-31

## 2023-05-31 NOTE — PROGRESS NOTES
[facility] CARE    NAME: Gamaliel Espinal  AGE: 56 y o  SEX: female  : 1965     DATE: 2023     Assessment and Plan:     Problem List Items Addressed This Visit     Esophageal dysphagia    Relevant Orders    Ambulatory Referral to Gastroenterology    Hiatal hernia with GERD    Relevant Orders    Ambulatory Referral to Gastroenterology    Irritable bowel syndrome with both constipation and diarrhea    Relevant Orders    Ambulatory Referral to Gastroenterology   Other Visit Diagnoses     Primary insomnia    -  Primary    Relevant Medications    traZODone (DESYREL) 50 mg tablet          Immunizations and preventive care screenings were discussed with patient today  Appropriate education was printed on patient's after visit summary  Counseling:  Alcohol/drug use: discussed moderation in alcohol intake, the recommendations for healthy alcohol use, and avoidance of illicit drug use  Dental Health: discussed importance of regular tooth brushing, flossing, and dental visits  Injury prevention: discussed safety/seat belts, safety helmets, smoke detectors, carbon dioxide detectors, and smoking near bedding or upholstery  Sexual health: discussed sexually transmitted diseases, partner selection, use of condoms, avoidance of unintended pregnancy, and contraceptive alternatives  Exercise: the importance of regular exercise/physical activity was discussed  Recommend exercise 3-5 times per week for at least 30 minutes  Return in about 6 months (around 2023)  Chief Complaint:     Chief Complaint   Patient presents with   • Annual Exam     And 1 month f/u      History of Present Illness:     Adult Annual Physical   Patient here for a comprehensive physical exam  The patient reports no problems  Hypertension  This is a chronic problem  The problem is controlled   Pertinent negatives include no anxiety, blurred vision, chest pain, neck pain, orthopnea, palpitations, peripheral edema, PND or shortness of breath  There are no associated agents to hypertension  Risk factors for coronary artery disease include post-menopausal state  Past treatments include angiotensin blockers  The current treatment provides significant improvement  There are no compliance problems  There is no history of angina, CAD/MI, heart failure or PVD  There is no history of chronic renal disease, hyperaldosteronism, hyperparathyroidism, a hypertension causing med, renovascular disease or a thyroid problem  Diet and Physical Activity  Diet/Nutrition: well balanced diet  Exercise: no formal exercise  Depression Screening  PHQ-2/9 Depression Screening         General Health  Sleep: sleeps poorly and gets 4-6 hours of sleep on average  Hearing: normal - bilateral   Vision: no vision problems, most recent eye exam <1 year ago, wears glasses and wears contacts  Dental: no dental visits for >1 year, brushes teeth twice daily and flosses teeth occasionally  /GYN Health  Patient is: postmenopausal  Last menstrual period: hysterectomy 1998  Review of Systems:     Review of Systems   Eyes: Negative for blurred vision  Respiratory: Negative for shortness of breath  Cardiovascular: Negative for chest pain, palpitations, orthopnea and PND  Musculoskeletal: Negative for neck pain  All other systems reviewed and are negative  Past Medical History:     Past Medical History:   Diagnosis Date   • [facility] lower extremity (deep [facility] ) 06/01/2016    was on elequis x 3 months   • GERD (gastroesophageal reflux disease) 2016    Endoscopy dilation 8/2/22   • Hyperlipidemia    • Hypertension 2017    Dizziness and hard to walk steady   • Inflammatory bowel disease 2022    Colonoscopy 9/15/22   microscopic colitis   • Kidney stone     2mm stone found on left side   fluoroscopy 1/2/23   • Raynaud disease       Past Surgical History:     Past Surgical History:   Procedure Laterality Date   • HYSTERECTOMY     • VARICOSE VEIN SURGERY Right       Social History:     Social History     Socioeconomic History   • Marital status: /Civil Union     Spouse name: None   • Number of children: None   • Years of education: None   • Highest education level: None   Occupational History   • None   Tobacco Use   • Smoking status: Former     Packs/day: 1 00     Years: 20 00     Total pack years: 20 00     Types: Cigarettes     Start date: 1984     Quit date: 2001     Years since quittin 4   • Smokeless tobacco: Never   Vaping Use   • Vaping Use: Never used   Substance and Sexual Activity   • Alcohol use: No   • Drug use: No   • Sexual activity: Yes     Partners: Male     Birth control/protection: Surgical   Other Topics Concern   • None   Social History Narrative   • None     Social Determinants of Health     Financial Resource Strain: Not on file   Food Insecurity: Not on file   Transportation Needs: Not on file   Physical Activity: Not on file   Stress: Not on file   Social Connections: Not on file   Intimate Partner Violence: Not on file   Housing Stability: Not on file      Family History:     Family History   Problem Relation Age of Onset   • Hypertension Mother    • Hypertension Father    • Hypertension Maternal Grandmother       Current Medications:     Current Outpatient Medications   Medication Sig Dispense Refill   • diphenhydrAMINE (BENADRYL) 50 mg capsule Take 50 mg by mouth every 6 (six) hours as needed for itching     • Multiple Vitamins-Minerals (MULTIVITAMIN WITH MINERALS) tablet Take 1 tablet by mouth daily     • Omega-3 Fatty Acids (fish oil) 1,000 mg Take 2 g by mouth 2 (two) times a day     • omeprazole (PriLOSEC) 20 mg delayed release capsule      • traZODone (DESYREL) 50 mg tablet Take 1 tablet (50 mg total) by mouth daily at bedtime 30 tablet 0   • valsartan (DIOVAN) 80 mg tablet TAKE 1 TABLET BY MOUTH EVERY "DAY 90 tablet 3     No current facility-administered medications for this visit  Allergies: Allergies   Allergen Reactions   • Metronidazole Other (See Comments)     disoriented      Physical Exam:     /80   Pulse 73   Temp 97 5 °F (36 4 °C) (Tympanic)   Ht 5' 5\" (1 651 m)   Wt 81 2 kg (179 lb)   SpO2 98%   BMI 29 79 kg/m²     Physical Exam  Vitals and nursing note reviewed  Constitutional:       Appearance: Normal appearance  She is well-developed  HENT:      Head: Normocephalic and atraumatic  Right Ear: Tympanic membrane, ear canal and external ear normal       Left Ear: Tympanic membrane, ear canal and external ear normal       Nose: Nose normal       Mouth/Throat:      Mouth: Mucous membranes are moist    Eyes:      General: Lids are normal  Vision grossly intact  Conjunctiva/sclera: Conjunctivae normal       Pupils: Pupils are equal, round, and reactive to light  Cardiovascular:      Rate and Rhythm: Normal rate and regular rhythm  Pulses: Normal pulses  Heart sounds: Normal heart sounds, S1 normal and S2 normal       No friction rub  No gallop  No S3 sounds  Pulmonary:      Effort: Pulmonary effort is normal       Breath sounds: Normal breath sounds  Abdominal:      General: Bowel sounds are normal       Palpations: Abdomen is soft  Tenderness: There is no abdominal tenderness  Genitourinary:     Comments: Deferred  Musculoskeletal:         General: Normal range of motion  Cervical back: Normal range of motion and neck supple  Right lower leg: No edema  Left lower leg: No edema  Lymphadenopathy:      Cervical: No cervical adenopathy  Skin:     General: Skin is warm and dry  Capillary Refill: Capillary refill takes less than 2 seconds  Neurological:      General: No focal deficit present  Mental Status: She is alert and oriented to person, place, and time  Motor: Motor function is intact  Gait: Gait is intact   " Psychiatric:         Attention and Perception: Attention and perception normal          Mood and Affect: Mood and affect normal          Speech: Speech normal          Behavior: Behavior normal  Behavior is cooperative  Thought Content: Thought content normal          Cognition and Memory: Cognition and memory normal          Judgment: Judgment normal           Appointment on 05/22/2023   Component Date Value Ref Range Status   • Cholesterol 05/22/2023 213 (H)  See Comment mg/dL Final    Cholesterol:         Pediatric <18 Years        Desirable          <170 mg/dL      Borderline High    170-199 mg/dL      High               >=200 mg/dL        Adult >=18 Years            Desirable         <200 mg/dL      Borderline High   200-239 mg/dL      High              >239 mg/dL     • Triglycerides 05/22/2023 62  See Comment mg/dL Final    Triglyceride:     0-9Y            <75mg/dL     10Y-17Y         <90 mg/dL       >=18Y     Normal          <150 mg/dL     Borderline High 150-199 mg/dL     High            200-499 mg/dL        Very High       >499 mg/dL    Specimen collection should occur prior to N-Acetylcysteine or Metamizole administration due to the potential for falsely depressed results  • HDL, Direct 05/22/2023 82  >=50 mg/dL Final   • LDL Calculated 05/22/2023 119 (H)  0 - 100 mg/dL Final    This screening LDL is a calculated result  It does not have the accuracy of the Direct Measured LDL in the monitoring of patients with hyperlipidemia and/or statin therapy  Direct Measure LDL (VAH775) must be ordered separately in these patients    LDL Cholesterol:     Optimal           <100 mg/dl     Near Optimal      100-129 mg/dl     Above Optimal       Borderline High 130-159 mg/dl       High            160-189 mg/dl       Very High       >189 mg/dl          • WBC 05/22/2023 3 74 (L)  4 31 - 10 16 Thousand/uL Final   • RBC 05/22/2023 4 44  3 81 - 5 12 Million/uL Final   • Hemoglobin 05/22/2023 13 4  11 5 - 15 4 g/dL Final   • Hematocrit 05/22/2023 42 8  34 8 - 46 1 % Final   • MCV 05/22/2023 96  82 - 98 fL Final   • MCH 05/22/2023 30 2  26 8 - 34 3 pg Final   • MCHC 05/22/2023 31 3 (L)  31 4 - 37 4 g/dL Final   • RDW 05/22/2023 12 7  11 6 - 15 1 % Final   • MPV 05/22/2023 11 2  8 9 - 12 7 fL Final   • Platelets 03/58/9702 269  149 - 390 Thousands/uL Final   • nRBC 05/22/2023 0  /100 WBCs Final   • Neutrophils Relative 05/22/2023 51  43 - 75 % Final   • Immat GRANS % 05/22/2023 0  0 - 2 % Final   • Lymphocytes Relative 05/22/2023 35  14 - 44 % Final   • Monocytes Relative 05/22/2023 9  4 - 12 % Final   • Eosinophils Relative 05/22/2023 4  0 - 6 % Final   • Basophils Relative 05/22/2023 1  0 - 1 % Final   • Neutrophils Absolute 05/22/2023 1 87  1 85 - 7 62 Thousands/µL Final   • Immature Grans Absolute 05/22/2023 0 00  0 00 - 0 20 Thousand/uL Final   • Lymphocytes Absolute 05/22/2023 1 31  0 60 - 4 47 Thousands/µL Final   • Monocytes Absolute 05/22/2023 0 35  0 17 - 1 22 Thousand/µL Final   • Eosinophils Absolute 05/22/2023 0 16  0 00 - 0 61 Thousand/µL Final   • Basophils Absolute 05/22/2023 0 05  0 00 - 0 10 Thousands/µL Final   • Sodium 05/22/2023 138  135 - 147 mmol/L Final   • Potassium 05/22/2023 4 2  3 5 - 5 3 mmol/L Final   • Chloride 05/22/2023 110 (H)  96 - 108 mmol/L Final   • CO2 05/22/2023 27  21 - 32 mmol/L Final   • ANION GAP 05/22/2023 1 (L)  4 - 13 mmol/L Final   • BUN 05/22/2023 14  5 - 25 mg/dL Final   • Creatinine 05/22/2023 0 82  0 60 - 1 30 mg/dL Final    Standardized to IDMS reference method   • Glucose, Fasting 05/22/2023 77  65 - 99 mg/dL Final    Specimen collection should occur prior to Sulfasalazine administration due to the potential for falsely depressed results  Specimen collection should occur prior to Sulfapyridine administration due to the potential for falsely elevated results     • Calcium 05/22/2023 9 4  8 3 - 10 1 mg/dL Final   • AST 05/22/2023 18  5 - 45 U/L Final    Specimen collection should occur prior to Sulfasalazine administration due to the potential for falsely depressed results  • ALT 05/22/2023 30  12 - 78 U/L Final    Specimen collection should occur prior to Sulfasalazine and/or Sulfapyridine administration due to the potential for falsely depressed results  • Alkaline Phosphatase 05/22/2023 61  46 - 116 U/L Final   • Total Protein 05/22/2023 7 1  6 4 - 8 4 g/dL Final   • Albumin 05/22/2023 3 7  3 5 - 5 0 g/dL Final   • Total Bilirubin 05/22/2023 0 59  0 20 - 1 00 mg/dL Final    Use of this assay is not recommended for patients undergoing treatment with eltrombopag due to the potential for falsely elevated results  • eGFR 05/22/2023 80  ml/min/1 73sq m Final   • TSH 3RD GENERATON 05/22/2023 1 672  0 450 - 4 500 uIU/mL Final    The recommended reference ranges for TSH during pregnancy are as follows:   First trimester 0 1 to 2 5 uIU/mL   Second trimester  0 2 to 3 0 uIU/mL   Third trimester 0 3 to 3 0 uIU/m    Note: Normal ranges may not apply to patients who are transgender, non-binary, or whose legal sex, sex at birth, and gender identity differ  Adult TSH (3rd generation) reference range follows the recommended guidelines of the [employer] Thy[employer] Association, January, 2020  • Hepatitis C Ab 05/22/2023 Non-reactive  Non-Reactive Final     I have reviewed all the lab results  There are some abnormalities that are not critical to the patient's health, I have discussed these in person at this office appointment      Ambar Valero, [address] [place] [address]

## 2023-06-01 ENCOUNTER — TELEPHONE (OUTPATIENT)
Dept: ADMINISTRATIVE | Facility: OTHER | Age: 58
End: 2023-06-01

## 2023-06-01 NOTE — TELEPHONE ENCOUNTER
Upon review of the In Basket request we were able to locate, review, and update the patient chart as requested for Mammogram     Any additional questions or concerns should be emailed to the Practice Liaisons via the appropriate education email address, please do not reply via In Basket      Thank you  Shelby Burgess

## 2023-06-01 NOTE — TELEPHONE ENCOUNTER
----- Message from Sydney Braswell sent at 5/31/2023  3:47 PM EDT -----  Regarding: mammogram  05/31/23 3:47 PM    Hello, our patient Sandra Walter has had Mammogram completed/performed  Please assist in updating the patient chart by pulling the Care Everywhere (CE) document  The date of service is 5/9/2023 [facility]       Thank you,  Nika Jenkins  [facility]

## 2023-06-13 DIAGNOSIS — I10 PRIMARY HYPERTENSION: ICD-10-CM

## 2023-06-13 RX ORDER — VALSARTAN 80 MG/1
80 TABLET ORAL DAILY
Qty: 90 TABLET | Refills: 1 | Status: SHIPPED | OUTPATIENT
Start: 2023-06-13

## 2023-06-14 DIAGNOSIS — F51.01 PRIMARY INSOMNIA: ICD-10-CM

## 2023-06-14 RX ORDER — TRAZODONE HYDROCHLORIDE 50 MG/1
TABLET ORAL
Qty: 90 TABLET | Refills: 1 | Status: SHIPPED | OUTPATIENT
Start: 2023-06-14

## 2023-08-30 ENCOUNTER — HOSPITAL ENCOUNTER (EMERGENCY)
Facility: HOSPITAL | Age: 58
Discharge: HOME | End: 2023-08-30
Attending: EMERGENCY MEDICINE | Admitting: EMERGENCY MEDICINE
Payer: COMMERCIAL

## 2023-08-30 ENCOUNTER — APPOINTMENT (EMERGENCY)
Dept: CT IMAGING | Facility: HOSPITAL | Age: 58
End: 2023-08-30
Payer: COMMERCIAL

## 2023-08-30 VITALS
BODY MASS INDEX: 29.79 KG/M2 | WEIGHT: 179 LBS | OXYGEN SATURATION: 97 % | TEMPERATURE: 98.3 F | DIASTOLIC BLOOD PRESSURE: 60 MMHG | HEART RATE: 90 BPM | RESPIRATION RATE: 20 BRPM | SYSTOLIC BLOOD PRESSURE: 124 MMHG

## 2023-08-30 DIAGNOSIS — R10.9 ABDOMINAL PAIN: Primary | ICD-10-CM

## 2023-08-30 DIAGNOSIS — N20.0 RENAL STONE: ICD-10-CM

## 2023-08-30 DIAGNOSIS — R19.7 DIARRHEA: ICD-10-CM

## 2023-08-30 DIAGNOSIS — R93.89 ABNORMAL CT SCAN: ICD-10-CM

## 2023-08-30 LAB
ALBUMIN SERPL BCP-MCNC: 4.7 G/DL (ref 3.5–5)
ALP SERPL-CCNC: 63 U/L (ref 34–104)
ALT SERPL W P-5'-P-CCNC: 24 U/L (ref 7–52)
ANION GAP SERPL CALCULATED.3IONS-SCNC: 10 MMOL/L
AST SERPL W P-5'-P-CCNC: 21 U/L (ref 13–39)
ATRIAL RATE: 87 BPM
BACTERIA UR QL AUTO: ABNORMAL /HPF
BASOPHILS # BLD AUTO: 0.02 THOUSANDS/ÂΜL (ref 0–0.1)
BASOPHILS NFR BLD AUTO: 0 % (ref 0–1)
BILIRUB SERPL-MCNC: 0.58 MG/DL (ref 0.2–1)
BILIRUB UR QL STRIP: NEGATIVE
BUN SERPL-MCNC: 15 MG/DL (ref 5–25)
CALCIUM SERPL-MCNC: 9.7 MG/DL (ref 8.4–10.2)
CHLORIDE SERPL-SCNC: 102 MMOL/L (ref 96–108)
CLARITY UR: ABNORMAL
CO2 SERPL-SCNC: 24 MMOL/L (ref 21–32)
COLOR UR: YELLOW
CREAT SERPL-MCNC: 0.89 MG/DL (ref 0.6–1.3)
EOSINOPHIL # BLD AUTO: 0.06 THOUSAND/ÂΜL (ref 0–0.61)
EOSINOPHIL NFR BLD AUTO: 1 % (ref 0–6)
ERYTHROCYTE [DISTWIDTH] IN BLOOD BY AUTOMATED COUNT: 12.1 % (ref 11.6–15.1)
GFR SERPL CREATININE-BSD FRML MDRD: 73 ML/MIN/1.73SQ M
GLUCOSE SERPL-MCNC: 102 MG/DL (ref 65–140)
GLUCOSE UR STRIP-MCNC: NEGATIVE MG/DL
HCT VFR BLD AUTO: 45.9 % (ref 34.8–46.1)
HGB BLD-MCNC: 14.5 G/DL (ref 11.5–15.4)
HGB UR QL STRIP.AUTO: NEGATIVE
IMM GRANULOCYTES # BLD AUTO: 0.02 THOUSAND/UL (ref 0–0.2)
IMM GRANULOCYTES NFR BLD AUTO: 0 % (ref 0–2)
KETONES UR STRIP-MCNC: NEGATIVE MG/DL
LEUKOCYTE ESTERASE UR QL STRIP: NEGATIVE
LIPASE SERPL-CCNC: 43 U/L (ref 11–82)
LYMPHOCYTES # BLD AUTO: 0.25 THOUSANDS/ÂΜL (ref 0.6–4.47)
LYMPHOCYTES NFR BLD AUTO: 3 % (ref 14–44)
MAGNESIUM SERPL-MCNC: 1.9 MG/DL (ref 1.9–2.7)
MCH RBC QN AUTO: 30.6 PG (ref 26.8–34.3)
MCHC RBC AUTO-ENTMCNC: 31.6 G/DL (ref 31.4–37.4)
MCV RBC AUTO: 97 FL (ref 82–98)
MONOCYTES # BLD AUTO: 0.43 THOUSAND/ÂΜL (ref 0.17–1.22)
MONOCYTES NFR BLD AUTO: 6 % (ref 4–12)
MUCOUS THREADS UR QL AUTO: ABNORMAL
NEUTROPHILS # BLD AUTO: 7.05 THOUSANDS/ÂΜL (ref 1.85–7.62)
NEUTS SEG NFR BLD AUTO: 90 % (ref 43–75)
NITRITE UR QL STRIP: NEGATIVE
NON-SQ EPI CELLS URNS QL MICRO: ABNORMAL /HPF
NRBC BLD AUTO-RTO: 0 /100 WBCS
P AXIS: 69 DEGREES
PH UR STRIP.AUTO: 5.5 [PH]
PLATELET # BLD AUTO: 236 THOUSANDS/UL (ref 149–390)
PMV BLD AUTO: 10.4 FL (ref 8.9–12.7)
POTASSIUM SERPL-SCNC: 3.4 MMOL/L (ref 3.5–5.3)
PR INTERVAL: 164 MS
PROT SERPL-MCNC: 7.2 G/DL (ref 6.4–8.4)
PROT UR STRIP-MCNC: ABNORMAL MG/DL
QRS AXIS: -1 DEGREES
QRSD INTERVAL: 90 MS
QT INTERVAL: 406 MS
QTC INTERVAL: 488 MS
RBC # BLD AUTO: 4.74 MILLION/UL (ref 3.81–5.12)
RBC #/AREA URNS AUTO: ABNORMAL /HPF
SODIUM SERPL-SCNC: 136 MMOL/L (ref 135–147)
SP GR UR STRIP.AUTO: >=1.03
T WAVE AXIS: 63 DEGREES
UROBILINOGEN UR QL STRIP.AUTO: 0.2 E.U./DL
VENTRICULAR RATE: 87 BPM
WBC # BLD AUTO: 7.83 THOUSAND/UL (ref 4.31–10.16)
WBC #/AREA URNS AUTO: ABNORMAL /HPF

## 2023-08-30 PROCEDURE — 93010 ELECTROCARDIOGRAM REPORT: CPT | Performed by: INTERNAL MEDICINE

## 2023-08-30 PROCEDURE — G1004 CDSM NDSC: HCPCS

## 2023-08-30 PROCEDURE — 83735 ASSAY OF MAGNESIUM: CPT

## 2023-08-30 PROCEDURE — 85025 COMPLETE CBC W/AUTO DIFF WBC: CPT

## 2023-08-30 PROCEDURE — 99285 EMERGENCY DEPT VISIT HI MDM: CPT

## 2023-08-30 PROCEDURE — 80053 COMPREHEN METABOLIC PANEL: CPT

## 2023-08-30 PROCEDURE — 81001 URINALYSIS AUTO W/SCOPE: CPT

## 2023-08-30 PROCEDURE — 81003 URINALYSIS AUTO W/O SCOPE: CPT

## 2023-08-30 PROCEDURE — 99284 EMERGENCY DEPT VISIT MOD MDM: CPT

## 2023-08-30 PROCEDURE — 93005 ELECTROCARDIOGRAM TRACING: CPT

## 2023-08-30 PROCEDURE — 36415 COLL VENOUS BLD VENIPUNCTURE: CPT

## 2023-08-30 PROCEDURE — 74177 CT ABD & PELVIS W/CONTRAST: CPT

## 2023-08-30 PROCEDURE — 83690 ASSAY OF LIPASE: CPT

## 2023-08-30 PROCEDURE — 96361 HYDRATE IV INFUSION ADD-ON: CPT

## 2023-08-30 PROCEDURE — 96374 THER/PROPH/DIAG INJ IV PUSH: CPT

## 2023-08-30 RX ORDER — POTASSIUM CHLORIDE 20 MEQ/1
20 TABLET, EXTENDED RELEASE ORAL ONCE
Status: COMPLETED | OUTPATIENT
Start: 2023-08-30 | End: 2023-08-30

## 2023-08-30 RX ORDER — ONDANSETRON 2 MG/ML
4 INJECTION INTRAMUSCULAR; INTRAVENOUS ONCE
Status: COMPLETED | OUTPATIENT
Start: 2023-08-30 | End: 2023-08-30

## 2023-08-30 RX ADMIN — IOHEXOL 100 ML: 350 INJECTION, SOLUTION INTRAVENOUS at 11:10

## 2023-08-30 RX ADMIN — SODIUM CHLORIDE 1000 ML: 0.9 INJECTION, SOLUTION INTRAVENOUS at 11:26

## 2023-08-30 RX ADMIN — POTASSIUM CHLORIDE 20 MEQ: 1500 TABLET, EXTENDED RELEASE ORAL at 13:53

## 2023-08-30 RX ADMIN — ONDANSETRON 4 MG: 2 INJECTION INTRAMUSCULAR; INTRAVENOUS at 11:53

## 2023-08-30 NOTE — ED PROVIDER NOTES
History  Chief Complaint   Patient presents with   • Abdominal Pain     Located under left breast radiating to under right rib cage. Patient also reporting "yellow mucus" when having a bowel movement. Patient is a 57 yo female pmhx of hypertension arriving for evaluation of bilateral upper abdominal pain. Patient tender to palpation in b/l upper quadrants. Patient denies nausea/vomiting. Patient states, "Feels bloated every time I eat." Patient denies "Yellow, mucous diarrhea like when I had a colonoscopy."  Patient reports that for the past 2 months intermittently she will have a bloated, full feeling after eating in her right upper quadrant. Patient states that she is tender in her right upper quadrant on palpation more so than her left. Patient has no epigastric pain. Patient has no chest pain, no shortness of breath, no difficulty breathing. Patient reports that she started with mucousy, yellow diarrhea last night at 10 PM.  Patient states that she has no urinary symptoms including blood in urine, pain with urination. Prior to Admission Medications   Prescriptions Last Dose Informant Patient Reported? Taking?    Multiple Vitamins-Minerals (MULTIVITAMIN WITH MINERALS) tablet 8/29/2023  Yes Yes   Sig: Take 1 tablet by mouth daily   Omega-3 Fatty Acids (fish oil) 1,000 mg 8/29/2023  Yes Yes   Sig: Take 2 g by mouth 2 (two) times a day   diphenhydrAMINE (BENADRYL) 50 mg capsule 8/29/2023  Yes Yes   Sig: Take 50 mg by mouth every 6 (six) hours as needed for itching   omeprazole (PriLOSEC) 20 mg delayed release capsule 8/29/2023  Yes Yes   traZODone (DESYREL) 50 mg tablet Not Taking  No No   Sig: TAKE 1 TABLET BY MOUTH DAILY AT BEDTIME   Patient not taking: Reported on 8/30/2023   valsartan (DIOVAN) 80 mg tablet 8/29/2023  No Yes   Sig: Take 1 tablet (80 mg total) by mouth daily      Facility-Administered Medications: None       Past Medical History:   Diagnosis Date   • DVT of lower extremity (deep venous thrombosis) ([address]) 2016    was on elequis x 3 months   • GERD (gastroesophageal reflux disease) 2016    Endoscopy dilation 22   • Hyperlipidemia    • Hypertension 2017    Dizziness and hard to walk steady   • Inflammatory bowel disease     Colonoscopy 9/15/22. .microscopic colitis   • Kidney stone     2mm stone found on left side. .fluoroscopy 23   • Raynaud disease        Past Surgical History:   Procedure Laterality Date   • HYSTERECTOMY     • VARICOSE VEIN SURGERY Right        Family History   Problem Relation Age of Onset   • Hypertension Mother    • Hypertension Father    • Hypertension Maternal Grandmother      I have reviewed and agree with the history as documented. E-Cigarette/Vaping   • E-Cigarette Use Never User      E-Cigarette/Vaping Substances   • Nicotine No    • THC No    • CBD No    • Flavoring No    • Other No    • Unknown No      Social History     Tobacco Use   • Smoking status: Former     Packs/day: 1.00     Years: 20.00     Total pack years: 20.00     Types: Cigarettes     Start date: 1984     Quit date: 2001     Years since quittin.6   • Smokeless tobacco: Never   Vaping Use   • Vaping Use: Never used   Substance Use Topics   • Alcohol use: No   • Drug use: No       Review of Systems   Constitutional: Negative. HENT: Negative. Eyes: Negative. Respiratory: Negative. Cardiovascular: Negative. Gastrointestinal: Positive for abdominal pain, diarrhea and nausea. Negative for vomiting. Endocrine: Negative. Genitourinary: Negative. Musculoskeletal: Negative. Skin: Negative. Allergic/Immunologic: Negative. Neurological: Negative. Hematological: Negative. Psychiatric/Behavioral: Negative. All other systems reviewed and are negative. Physical Exam  Physical Exam  Vitals and nursing note reviewed. Constitutional:       Appearance: She is well-developed and normal weight. HENT:      Head: Normocephalic. Mouth/Throat:      Mouth: Mucous membranes are moist.   Eyes:      Extraocular Movements: Extraocular movements intact. Pupils: Pupils are equal, round, and reactive to light. Cardiovascular:      Rate and Rhythm: Normal rate and regular rhythm. Heart sounds: Normal heart sounds. Pulmonary:      Effort: Pulmonary effort is normal. No respiratory distress. Breath sounds: Normal breath sounds. No stridor. No wheezing, rhonchi or rales. Chest:      Chest wall: No tenderness. Abdominal:      General: Abdomen is flat. Bowel sounds are normal.      Palpations: Abdomen is soft. Tenderness: There is abdominal tenderness in the right upper quadrant and left upper quadrant. Skin:     General: Skin is warm. Capillary Refill: Capillary refill takes less than 2 seconds. Neurological:      Mental Status: She is alert.          Vital Signs  ED Triage Vitals [08/30/23 1019]   Temperature Pulse Respirations Blood Pressure SpO2   98.3 °F (36.8 °C) 93 17 128/73 98 %      Temp src Heart Rate Source Patient Position - Orthostatic VS BP Location FiO2 (%)   -- Monitor Sitting Left arm --      Pain Score       7           Vitals:    08/30/23 1130 08/30/23 1355 08/30/23 1400 08/30/23 1500   BP: 128/73 109/55 110/56 124/60   Pulse: 80 84 83 90   Patient Position - Orthostatic VS: Lying Lying Lying          Visual Acuity  Visual Acuity    Flowsheet Row Most Recent Value   L Pupil Size (mm) 2   R Pupil Size (mm) 2          ED Medications  Medications   sodium chloride 0.9 % bolus 1,000 mL (0 mL Intravenous Stopped 8/30/23 1356)   iohexol (OMNIPAQUE) 350 MG/ML injection (MULTI-DOSE) 100 mL (100 mL Intravenous Given 8/30/23 1110)   ondansetron (ZOFRAN) injection 4 mg (4 mg Intravenous Given 8/30/23 1153)   potassium chloride (K-DUR,KLOR-CON) CR tablet 20 mEq (20 mEq Oral Given 8/30/23 1353)       Diagnostic Studies  Results Reviewed     Procedure Component Value Units Date/Time    CMP [[id number]]  (Abnormal) Collected: 08/30/23 1034    Lab Status: Final result Specimen: Blood from Arm, Left Updated: 08/30/23 1103     Sodium 136 mmol/L      Potassium 3.4 mmol/L      Chloride 102 mmol/L      CO2 24 mmol/L      ANION GAP 10 mmol/L      BUN 15 mg/dL      Creatinine 0.89 mg/dL      Glucose 102 mg/dL      Calcium 9.7 mg/dL      AST 21 U/L      ALT 24 U/L      Alkaline Phosphatase 63 U/L      Total Protein 7.2 g/dL      Albumin 4.7 g/dL      Total Bilirubin 0.58 mg/dL      eGFR 73 ml/min/1.73sq m     Narrative:      [place] guidelines for Chronic Kidney Disease (CKD):   •  Stage 1 with normal or high GFR (GFR > 90 mL/min/1.73 square meters)  •  Stage 2 Mild CKD (GFR = 60-89 mL/min/1.73 square meters)  •  Stage 3A Moderate CKD (GFR = 45-59 mL/min/1.73 square meters)  •  Stage 3B Moderate CKD (GFR = 30-44 mL/min/1.73 square meters)  •  Stage 4 Severe CKD (GFR = 15-29 mL/min/1.73 square meters)  •  Stage 5 End Stage CKD (GFR <15 mL/min/1.73 square meters)  Note: GFR calculation is accurate only with a steady state creatinine    Lipase [[id number]]  (Normal) Collected: 08/30/23 1034    Lab Status: Final result Specimen: Blood from Arm, Left Updated: 08/30/23 1103     Lipase 43 u/L     Magnesium [[id number]]  (Normal) Collected: 08/30/23 1034    Lab Status: Final result Specimen: Blood from Arm, Left Updated: 08/30/23 1103     Magnesium 1.9 mg/dL     Urine Microscopic [[id number]]  (Abnormal) Collected: 08/30/23 1049    Lab Status: Final result Specimen: Urine, Clean Catch Updated: 08/30/23 1102     RBC, UA 0-1 /hpf      WBC, UA 1-2 /hpf      Epithelial Cells Occasional /hpf      Bacteria, UA Occasional /hpf      MUCUS THREADS Occasional    UA w Reflex to Microscopic w Reflex to Culture [[id number]]  (Abnormal) Collected: 08/30/23 1049    Lab Status: Final result Specimen: Urine, Clean Catch Updated: 08/30/23 1054     Color, UA Yellow     Clarity, UA Hazy     Specific Gravity, UA >=1.030     pH, UA 5.5 Leukocytes, UA Negative     Nitrite, UA Negative     Protein, UA 2+ mg/dl      Glucose, UA Negative mg/dl      Ketones, UA Negative mg/dl      Urobilinogen, UA 0.2 E.U./dl      Bilirubin, UA Negative     Occult Blood, UA Negative    CBC and differential [[id number]]  (Abnormal) Collected: 08/30/23 1034    Lab Status: Final result Specimen: Blood from Arm, Left Updated: 08/30/23 1049     WBC 7.83 Thousand/uL      RBC 4.74 Million/uL      Hemoglobin 14.5 g/dL      Hematocrit 45.9 %      MCV 97 fL      MCH 30.6 pg      MCHC 31.6 g/dL      RDW 12.1 %      MPV 10.4 fL      Platelets 470 Thousands/uL      nRBC 0 /100 WBCs      Neutrophils Relative 90 %      Immat GRANS % 0 %      Lymphocytes Relative 3 %      Monocytes Relative 6 %      Eosinophils Relative 1 %      Basophils Relative 0 %      Neutrophils Absolute 7.05 Thousands/µL      Immature Grans Absolute 0.02 Thousand/uL      Lymphocytes Absolute 0.25 Thousands/µL      Monocytes Absolute 0.43 Thousand/µL      Eosinophils Absolute 0.06 Thousand/µL      Basophils Absolute 0.02 Thousands/µL     Narrative: This is an appended report. These results have been appended to a previously verified report. CT Abdomen pelvis with contrast   Final Result by Tera Sosa MD (08/30 3384)         1. No acute abnormality identified in the abdomen or pelvis. 2. Borderline decreased aortomesenteric angle of 22 degrees and decreased aortomesenteric distance 5 mm without evidence of obstruction which may suggest SMA syndrome in the appropriate clinical context. 3. Relatively dilated proximal left renal vein with distal narrowing beyond the aortomesenteric space consistent with nutcracker anatomy. Correlate with symptoms such as left leg pain, pelvic pain, and hematuria. 4. 3 mm nonobstructing left lower pole renal collecting system calculus. 5. Additional findings as noted.          Resident: Nura Rios, the attending radiologist, have reviewed the images and agree with the final report above. Workstation performed: [id number]                    Procedures  ECG 12 Lead Documentation Only    Date/Time: 8/30/2023 10:42 AM    Performed by: MICHA Jackson  Authorized by: MICHA Jackson    Indications / Diagnosis:  The nurse did an ekg, upper abdominal pain   ECG reviewed by me, the ED Provider: yes    Patient location:  ED  Interpretation:     Interpretation: normal    Rate:     ECG rate:  87    ECG rate assessment: normal    Rhythm:     Rhythm: sinus rhythm    Ectopy:     Ectopy: none    QRS:     QRS axis:  Normal  Conduction:     Conduction: normal    ST segments:     ST segments:  Normal  T waves:     T waves: normal               ED Course  ED Course as of 08/30/23 1816   Wed Aug 30, 2023   1045 CBC and differential  No leukocytosis, no anemia. 1105 CMP(!)  No elevated LFTs, no ZACH, no electrolyte abnormalities. 1105 UA w Reflex to Microscopic w Reflex to Culture(!)  No indication of UTI.   1329 Messaged AP from vascular surgery regarding patient's findings on CT scan and her exam. States will discuss with her attending and reach back out. SBIRT 20yo+    Flowsheet Row Most Recent Value   Initial Alcohol Screen: US AUDIT-C     1. How often do you have a drink containing alcohol? 0 Filed at: 08/30/2023 1036   2. How many drinks containing alcohol do you have on a typical day you are drinking? 0 Filed at: 08/30/2023 1036   3a. Male UNDER 65: How often do you have five or more drinks on one occasion? 0 Filed at: 08/30/2023 1036   3b. FEMALE Any Age, or MALE 65+: How often do you have 4 or more drinks on one occassion? 0 Filed at: 08/30/2023 1036   Audit-C Score 0 Filed at: 08/30/2023 1036   DYAN: How many times in the past year have you. .. Used an illegal drug or used a prescription medication for non-medical reasons?  Never Filed at: 08/30/2023 1036                    Medical Decision Making  Differential diagnosis including pancreatitis, small bowel obstruction, cholecystitis, cholangitis  Patient reporting that she started with bilateral upper abdominal pain a few days ago, but this is also been intermittent over the past few months. Patient reports that she has only had a hysterectomy for past surgical history. Patient states that she has no history of small bowel obstruction, no history of gallbladder disease. Patient reports that she feels bloated and uncomfortable after eating intermittently. Patient states that this is more so over the past few days. Patient reports that she is here with "yellow mucousy diarrhea since last night at 10 PM."  Patient denies any blood in her stool, any blood in urine. Blood work for abdominal pain ordered, fluids ordered, will order CT abdomen pelvis due to upper abdominal pain. No acute findings on blood work, no acute findings on UA. Lipase WNL to r/o pancreatitis. Patient offered pain medication multiple times and declines. Patient offered anti-emetic medication for nausea which she reports resolved after fluids. Discussed CT findings were initially TT to Dr. Tana Schmitz who recommended to discuss with vascular. Discussed with Vascular BROOKE Kaba. "SMA syndrome is not a vascular issue . ..it's more of decreased angulation of the angle between aorta and SMA due to paucity of retroperitoneal fat pad . ..typical management would be increased caloric intake and weight gain which is something's difficult given patients inability to eat . ..if conservative measures don't work then gen surgery rarely needs to re route the small intestine" Discussed that patient will follow up with Dr. Tana Schmitz. Patient provided follow up with vascular for further evaluation of "nutcracker anatomy finding." Discussed kidney stone finding, that is likely not the cause of her pain as it is in renal collecting system.  Discussed to follow up with Dr. Usama Beatty of urology for this. Patient is well aware of this kidney stone. Discussed can take tylenol for pain as she is on an ARB. Discussed recommendations of vascular BROOKE William. Patient stable for discharge as pain has been controlled and not required to have pain medication throughout stay. Patient tolerating PO at time of discharge. Discussed strict return precautions with patient. Reviewed reasons to return to ed. Patient verbalized understanding of diagnosis and agreement with discharge plan of care as well as understanding of reasons to return to ed. Amount and/or Complexity of Data Reviewed  Labs: ordered. Decision-making details documented in ED Course. Radiology: ordered. Risk  Prescription drug management. Disposition  Final diagnoses:   Abdominal pain   Diarrhea   Abnormal CT scan   Renal stone     Time reflects when diagnosis was documented in both MDM as applicable and the Disposition within this note     Time User Action Codes Description Comment    8/30/2023  3:06 PM Basilio Canales Add [R10.9] Abdominal pain     8/30/2023  3:06 PM Basilio Canales Add [R19.7] Diarrhea     8/30/2023  3:07 PM Bsailio Canales Add [R93.89] Abnormal CT scan     8/30/2023  3:07 PM Basilio Canales Add [N20.0] Renal stone       ED Disposition     ED Disposition   Discharge    Condition   Stable    Date/Time   Wed Aug 30, 2023  3:19 PM    [address] discharge to home/self care.                Follow-up Information     Follow up With Specialties Details Why Contact Info Additional [address] [facility], [address] [facility] Internal Medicine, Nurse Practitioner Schedule an appointment as soon as possible for a visit in 2 days For further evaluation of symptoms [address] [facility] [address]  [facility] [place] [address]  [address] General Surgery Schedule an appointment as soon as possible for a visit in 2 days For further evaluation of symptoms [address] [facility] [address] [facility],[address]  [address] [place], [address] [place], [facility], [place], [address] [facility]    Yuniel Beatty MD Urology Schedule an appointment as soon as possible for a visit in 2 days For further evaluation of symptoms [address]  Vascular Surgery  abnormal CT findings [address] [facility] [place] [address]  [phone] [facility], [address] , [place], [place], [address], [phone]          Discharge Medication List as of 8/30/2023  3:19 PM      CONTINUE these medications which have NOT CHANGED    Details   diphenhydrAMINE (BENADRYL) 50 mg capsule Take 50 mg by mouth every 6 (six) hours as needed for itching, Historical Med      Multiple Vitamins-Minerals (MULTIVITAMIN WITH MINERALS) tablet Take 1 tablet by mouth daily, Historical Med      Omega-3 Fatty Acids (fish oil) 1,000 mg Take 2 g by mouth 2 (two) times a day, Historical Med      omeprazole (PriLOSEC) 20 mg delayed release capsule Starting Sat 2/11/2023, Historical Med      traZODone (DESYREL) 50 mg tablet TAKE 1 TABLET BY MOUTH DAILY AT BEDTIME, Normal      valsartan (DIOVAN) 80 mg tablet Take 1 tablet (80 mg total) by mouth daily, Starting Tue 6/13/2023, Normal                 PDMP Review       Value Time User    PDMP Reviewed  Yes 4/5/2022 11:16 AM Aroldo Cifuentes, [address] [facility]          ED Provider  Electronically Signed by           MICHA Burnett  08/30/23 3151

## 2023-08-30 NOTE — DISCHARGE INSTRUCTIONS
Follow up with Dr. Hans Rincon in office for further evaluation of potential SMA syndrome. Take tylenol as needed for pain. Return for worsening of symptoms. A kidney stone was seen, follow up with Dr. Kristopher Ramirez from urology as necessary. Follow up with vascular for further evaluation of CT scan findings.

## 2023-09-08 NOTE — PROGRESS NOTES
Assessment/Plan:    Upper abdominal pain of unknown etiology  Patient presents to the office referred from the emergency department with the abnormal CT suggesting a possible SMA syndrome for general surgery consultation. Patient provides no history to support the diagnosis of SMA syndrome. Her the only exception being chronic intermittent upper abdominal pain of uncertain etiology and early satiety    She has a relationship with Dr. Lore Dubin and has been appropriately and thoroughly evaluated including small bowel follow-through in February of this year which was a normal study. On physical examination she is well-nourished well-appearing female. She is pleasant competent reliable as a historian. Abdomen soft flat with mild tenderness to deep palpation limited to the right upper quadrant. No true guarding rebound or peritoneal signs. After thorough history, physical examination and review the imaging studies the patient does not appear to have clinical or radiographic evidence to support the diagnosis of SMA syndrome. With regards to the patient's radiographic diagnosis of nutcracker syndrome I have recommended consultation with vascular surgery for additional diagnostic and therapeutic treatment recommendations. All questions answered to the satisfaction of the patient. She has been encouraged to follow-up the practice at any point in the future with questions or concerns. Subjective:      Patient ID: Vasiliy George is a 56 y.o. female. Patient is here for a f/u on abnormal CT Scan 08/30/23. 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 Patient states she suffers from discomfort on upper abd, under both ribs. Patient takes nothing more than tylenol  for the pain. Patient suffers from diarrhea but that can possibly be from chron's. Her providers are still looking into that. Patient feels better when she doesn't eat or drink.  Eating or drinking makes the discomfort feel worst. Patient denies constipation or fevers/chills. Jose LOPEZ MA       The following portions of the patient's history were reviewed and updated as appropriate: allergies, current medications, past family history, past medical history, past social history, past surgical history and problem list.    Review of Systems   Constitutional: Negative for chills and fever. HENT: Negative for ear pain and sore throat. Eyes: Negative for pain and visual disturbance. Respiratory: Negative for cough and shortness of breath. Cardiovascular: Negative for chest pain and palpitations. Gastrointestinal: Positive for abdominal pain. Negative for vomiting. Genitourinary: Negative for dysuria and hematuria. Musculoskeletal: Negative for arthralgias and back pain. Skin: Negative for color change and rash. Neurological: Negative for seizures and syncope. All other systems reviewed and are negative. Objective:      /80 (BP Location: Left arm, Patient Position: Sitting, Cuff Size: Standard)   Pulse 75   Temp 97.6 °F (36.4 °C) (Temporal)   Ht 5' 5" (1.651 m)   Wt 83.5 kg (184 lb)   SpO2 97%   BMI 30.62 kg/m²          Physical Exam  Constitutional:       Appearance: She is well-developed. HENT:      Head: Normocephalic and atraumatic. Eyes:      Conjunctiva/sclera: Conjunctivae normal.      Pupils: Pupils are equal, round, and reactive to light. Cardiovascular:      Rate and Rhythm: Normal rate and regular rhythm. Pulmonary:      Effort: Pulmonary effort is normal.      Breath sounds: Normal breath sounds. Abdominal:      General: Bowel sounds are normal.      Palpations: Abdomen is soft. Comments: Benign abdominal examination   Musculoskeletal:         General: Normal range of motion. Cervical back: Normal range of motion and neck supple. Skin:     General: Skin is warm and dry.    Neurological:      Mental Status: She is alert and oriented to person, place, and time.   Psychiatric:         Behavior: Behavior normal.         Thought Content:  Thought content normal.         Judgment: Judgment normal.

## 2023-09-11 ENCOUNTER — CONSULT (OUTPATIENT)
Dept: SURGERY | Facility: CLINIC | Age: 58
End: 2023-09-11
Payer: COMMERCIAL

## 2023-09-11 VITALS
OXYGEN SATURATION: 97 % | SYSTOLIC BLOOD PRESSURE: 142 MMHG | BODY MASS INDEX: 30.66 KG/M2 | HEIGHT: 65 IN | DIASTOLIC BLOOD PRESSURE: 80 MMHG | HEART RATE: 75 BPM | WEIGHT: 184 LBS | TEMPERATURE: 97.6 F

## 2023-09-11 DIAGNOSIS — R10.10 UPPER ABDOMINAL PAIN OF UNKNOWN ETIOLOGY: Primary | ICD-10-CM

## 2023-09-11 DIAGNOSIS — R93.89 ABNORMAL CT SCAN: ICD-10-CM

## 2023-09-11 PROCEDURE — 99203 OFFICE O/P NEW LOW 30 MIN: CPT | Performed by: SURGERY

## 2023-09-11 NOTE — ASSESSMENT & PLAN NOTE
Patient presents to the office referred from the emergency department with the abnormal CT suggesting a possible SMA syndrome for general surgery consultation. Patient provides no history to support the diagnosis of SMA syndrome. Her the only exception being chronic intermittent upper abdominal pain of uncertain etiology and early satiety    She has a relationship with Dr. Angelika Bauer and has been appropriately and thoroughly evaluated including small bowel follow-through in February of this year which was a normal study. On physical examination she is well-nourished well-appearing female. She is pleasant competent reliable as a historian. Abdomen soft flat with mild tenderness to deep palpation limited to the right upper quadrant. No true guarding rebound or peritoneal signs. After thorough history, physical examination and review the imaging studies the patient does not appear to have clinical or radiographic evidence to support the diagnosis of SMA syndrome. With regards to the patient's radiographic diagnosis of nutcracker syndrome I have recommended consultation with vascular surgery for additional diagnostic and therapeutic treatment recommendations. All questions answered to the satisfaction of the patient. She has been encouraged to follow-up the practice at any point in the future with questions or concerns.

## 2023-09-13 NOTE — ED PROVIDER NOTES
History  Chief Complaint   Patient presents with    Dizziness     Pt presents to ED from home after feeling dizzy for days  Pt evaluated for dizziness three days ago, xray with labs which were benign at [facility]   Pt stated feeling "disconnected"  Pt (-) health conditions  Patient presents to the emergency department for evaluation of dizziness that has been present for approximately 4-5 days  She states she feels disconnected  She was seen at [place] emergency department and had labs done and a workup which she stated was unremarkable  She denies head injury  She denies headache or visual complaints  She denies nausea vomiting diarrhea  She denies fever chills cough for chest pain  She denies recent URI  Prior to Admission Medications   Prescriptions Last Dose Informant Patient Reported? Taking? DIPHENHYDRAMINE HCL PO   Yes No   Sig: Take 50 mg by mouth   metroNIDAZOLE (FLAGYL) 500 mg tablet 11/11/2017 at Unknown time  Yes Yes   Sig: Take 500 mg by mouth 2 (two) times a day      Facility-Administered Medications: None       History reviewed  No pertinent past medical history  Past Surgical History:   Procedure Laterality Date    HYSTERECTOMY  1997       History reviewed  No pertinent family history  I have reviewed and agree with the history as documented  Social History   Substance Use Topics    Smoking status: Former Smoker     Types: Cigarettes     Quit date: 1/12/2001    Smokeless tobacco: Never Used    Alcohol use No        Review of Systems   Constitutional: Negative  Negative for activity change, appetite change, chills, diaphoresis, fatigue and fever  HENT: Negative  Negative for congestion, drooling, rhinorrhea, sinus pressure, sneezing, sore throat, trouble swallowing and voice change  Eyes: Negative  Negative for photophobia and visual disturbance  Respiratory: Negative    Negative for cough, chest tightness, shortness of breath, wheezing Norris Augustin MD     This patient doesn't meet criteria for Lung Cancer Screening or associated short term follow up (Never Smoker), she is therefore not a part of the lung screening program.    Please discontinue the order for \"CT Lung Cancer Screening Low Dose WO Contrast ([id number])\".     Please instead order  \"CT Chest w/o contrast [id number]\".     If the imaging has already been scheduled, you will need to contact centralized scheduling [phone] to link the new order to the scheduled exam.        Thank you for your assistance.    Please feel free to contact me with any questions.    Chloe Smith  [facility]  [phone]   and stridor  Cardiovascular: Negative  Negative for chest pain, palpitations and leg swelling  Gastrointestinal: Negative  Negative for abdominal pain, anal bleeding, blood in stool, constipation, diarrhea, nausea, rectal pain and vomiting  Endocrine: Negative  Genitourinary: Negative  Negative for dysuria, flank pain, frequency, hematuria and urgency  Musculoskeletal: Negative  Negative for back pain, neck pain and neck stiffness  Skin: Negative  Negative for rash and wound  Allergic/Immunologic: Negative  Neurological: Positive for dizziness and light-headedness  Negative for tremors, seizures, syncope, speech difficulty, weakness, numbness and headaches  Hematological: Negative  Does not bruise/bleed easily  Psychiatric/Behavioral: Negative  Physical Exam  ED Triage Vitals [11/12/17 1713]   Temperature Pulse Respirations Blood Pressure SpO2   98 8 °F (37 1 °C) 77 16 (!) 176/79 100 %      Temp Source Heart Rate Source Patient Position - Orthostatic VS BP Location FiO2 (%)   Oral Monitor Sitting Right arm --      Pain Score       No Pain           Orthostatic Vital Signs  Vitals:    11/12/17 1713 11/12/17 1814   BP: (!) 176/79 143/76   Pulse: 77 84   Patient Position - Orthostatic VS: Sitting Sitting       Physical Exam   Constitutional: She is oriented to person, place, and time  She appears well-developed and well-nourished  Nontoxic appearance without respiratory distress  Patient is able to engage in normal conversation and answer simple questions appropriately  HENT:   Head: Normocephalic and atraumatic  Right Ear: External ear normal    Left Ear: External ear normal    Mouth/Throat: Oropharynx is clear and moist    Eyes: Conjunctivae and EOM are normal  Pupils are equal, round, and reactive to light  Cardiovascular: Normal rate, regular rhythm, normal heart sounds and intact distal pulses      Pulmonary/Chest: Effort normal and breath sounds normal    Abdominal: Soft  Bowel sounds are normal  There is no tenderness  There is no rebound and no guarding  Musculoskeletal: Normal range of motion  Neurological: She is alert and oriented to person, place, and time  She has normal reflexes  She displays normal reflexes  No cranial nerve deficit or sensory deficit  She exhibits normal muscle tone  Coordination normal    Skin: Skin is warm and dry  No rash noted  No erythema  No pallor  Psychiatric: She has a normal mood and affect  Her behavior is normal  Judgment and thought content normal    Nursing note and vitals reviewed  ED Medications  Medications - No data to display    Diagnostic Studies  Results Reviewed     None                 CT head without contrast   Final Result by Gilford Heap, MD (11/12 2794)      No acute intracranial abnormality  Workstation performed: [id number]                    Procedures  ECG 12 Lead Documentation  Date/Time: 11/12/2017 5:52 PM  Performed by: Aparna Fritz by: Ela Herrera     ECG reviewed by me, the ED Provider: yes    Patient location:  ED  Previous ECG:     Previous ECG:  Compared to current    Similarity:  Changes noted  Interpretation:     Interpretation: abnormal    Rate:     ECG rate:  71    ECG rate assessment: normal    Rhythm:     Rhythm: sinus rhythm    Ectopy:     Ectopy: none    QRS:     QRS axis:  Normal    QRS intervals:  Normal  Conduction:     Conduction: normal    ST segments:     ST segments:  Non-specific  T waves:     T waves: non-specific             Phone Contacts  ED Phone Contact    ED Course  ED Course as of Nov 12 1856   Ken Zhou Nov 12, 2017   [address] Patient is stable for discharge  Her EKG and head CT are unremarkable along with an unremarkable examination  Reviewed her visit to [facility] and the labs that were done there  I will refer the patient to Neurology  I discussed signs and symptoms that would require return to the emergency department sooner  MDM  CritCare Time    Disposition  Final diagnoses:   Dizziness     Time reflects when diagnosis was documented in both MDM as applicable and the Disposition within this note     Time User Action Codes Description Comment    11/12/2017  6:41 PM Kyara Caba Add [R42] Dizziness       ED Disposition     ED Disposition Condition Comment    Discharge  Doc [place] discharge to home/self care  Condition at discharge: Stable        Follow-up Information     Follow up With Specialties Details Why [facility], DO Neurology Schedule an appointment as soon as possible for a visit  [facility] [address] [facility]  [phone]          Discharge Medication List as of 11/12/2017  6:43 PM      CONTINUE these medications which have NOT CHANGED    Details   metroNIDAZOLE (FLAGYL) 500 mg tablet Take 500 mg by mouth 2 (two) times a day, Starting Thu 11/9/2017, Until Thu 11/16/2017, Historical Med      DIPHENHYDRAMINE HCL PO Take 50 mg by mouth, Historical Med           No discharge procedures on file      ED Provider  Electronically Signed by           Melba Holter, MD  11/12/17 9200

## 2023-10-10 ENCOUNTER — CONSULT (OUTPATIENT)
Dept: VASCULAR SURGERY | Facility: CLINIC | Age: 58
End: 2023-10-10
Payer: COMMERCIAL

## 2023-10-10 VITALS
TEMPERATURE: 97.2 F | OXYGEN SATURATION: 99 % | WEIGHT: 184 LBS | BODY MASS INDEX: 30.66 KG/M2 | SYSTOLIC BLOOD PRESSURE: 138 MMHG | DIASTOLIC BLOOD PRESSURE: 74 MMHG | HEART RATE: 70 BPM | HEIGHT: 65 IN

## 2023-10-10 DIAGNOSIS — K44.9 HIATAL HERNIA WITH GERD: ICD-10-CM

## 2023-10-10 DIAGNOSIS — R13.19 ESOPHAGEAL DYSPHAGIA: ICD-10-CM

## 2023-10-10 DIAGNOSIS — K58.2 IRRITABLE BOWEL SYNDROME WITH BOTH CONSTIPATION AND DIARRHEA: Primary | ICD-10-CM

## 2023-10-10 DIAGNOSIS — I87.1 NUTCRACKER PHENOMENON OF RENAL VEIN: ICD-10-CM

## 2023-10-10 DIAGNOSIS — K21.9 HIATAL HERNIA WITH GERD: ICD-10-CM

## 2023-10-10 DIAGNOSIS — I87.2 VENOUS INSUFFICIENCY OF RIGHT LOWER EXTREMITY: ICD-10-CM

## 2023-10-10 PROCEDURE — 99204 OFFICE O/P NEW MOD 45 MIN: CPT | Performed by: SURGERY

## 2023-10-10 NOTE — PROGRESS NOTES
Assessment/Plan:    Pt is a 55 yo F w/ GERD, hiatal hernia esophageal dysphagia, IBS, HTN, HLD, abdominal bloating, diarrhea    Nutcracker phenomenon of renal vein  -     Ambulatory Referral to Vascular Surgery  -     Urinalysis with microscopic  -reviewed CT scan which shows compression of the L renal vein. This remains quit narrow near the insertion to the IVC even not at an area of compression; mild dilitation peripheral to the compression; no significant enlargement of the ovarian vein  -although there is clearly renal vein compression, her L side back pain is vague, could be explained by kidney stones and her UA has been negative for even microscopic hematurria x 2 (in Jan [facility] and Aug [facility])  -will repeat UA one more time (asked patient to do this when flank pain is present), but Nutcracker syndrome seems unlikely given these findings    SMA phenomenonon  -reviewed CT scan;  Ao/SMA angle is marginally normal but it follows very closely along the aorta for a long segment causing compression of the duodenum  -however, sm bowel follow did not note any compression, symptoms not in the midepigastric area and no weight loss; will still consider this diagnosis but seems less likely tahn intrinsic GI tract disease    Irritable bowel syndrome with both constipation and diarrhea  Hiatal hernia with GERD  Esophageal dysphagia  -continues workup with GI, Dr. Suzy Sher; has already had small bowel followthru, colonoscopy, EGD?, and planning for capsule endoscopy  -per patient, suspected crohns although biopsies showed only nonspecific microscopic colitis  -can see bx and fluro procedures but not office notes or procedure notes; independent GI practice    RLE varicose veins and venous insufficienty  Hx of DVT  -hx of single episode provoked (after fall) R calf DVT in '16 treated with 3mo course Eliquis  -has RLE SVT about 3x/year  -evaluated at [facility] for venous insufficiency  -will discuss/investigate this further after GI symptoms fully worked up  -discussed medical management including daily compression, leg elevation, healthy weight, exercise    Followup in 3-4 months once GI workup complete      Subjective:      Patient ID: Miesha Nobles is a 56 y.o. female. New patient referred by DR. Nba Hatfield for nutcracker anatomy of renal vein had CTA of the abdomen and pelvis done 8/30/23. Pt reports having a eposide of sever abdominal pain which sent her to the ED since then pain has been coming and going also reports fullness after eating for a few hours after. . Pt is a former smoker. HPI:    Patient presents for evaluation of abdominal symptoms. She has bloating, pain, and early satiety. She reports feeling full >10yrs after a small meal more than 50% of the time. She has some intermittent L back pain. This is worse when lying down and feels like someone punched her. It can sometimes be more of a nagging feeling. She has been having these for about a year, slowly worsening. She has days with no symptoms and days with severe symptoms. She estimates about 12/30 days with symptoms. She also reports severe diarrhea for several years. She thinks that smaller meals reduce her symptoms. She notes 30lb weight loss over the past 3 years since stopping Atkins. She notes severe fatigue for many years. She is seeing GI, Dr. Jana Moncada, and so far has had EGD (can't see), small bowel follow through (care everywhere: normal other than slightly rapid transit time to colon), colonoscopy (microscopic colitis, can see path but not procedure note), over the last year. She reports esophageal stricture s/p dilatation, kidney stone, hiatal hernia, and suspected Crohn's although not biopsy proven. She was on a 3 mos course of steroid which helped her diarrhea.   She reports a large bowel obstruction in Jan.    She has hx of R calf vein DVT, provoked after fall (works at [employer]) in '13.  Was treated with 3 mos Eliquis. No other episodes. She gets RLE SVT several times a year. She has large varicosities on the RLE. She does not wear compression. She elevates her legs sometimes. The following portions of the patient's history were reviewed and updated as appropriate: allergies, current medications, past family history, past medical history, past social history, past surgical history and problem list.    Review of Systems   Constitutional: Positive for fatigue. HENT: Negative. Eyes: Negative. Respiratory: Negative. Cardiovascular: Positive for leg swelling. Gastrointestinal: Positive for abdominal distention and abdominal pain. Endocrine: Negative. Genitourinary: Negative. Musculoskeletal: Negative. Skin: Negative. Negative for color change and wound. Allergic/Immunologic: Negative. Neurological: Positive for dizziness and light-headedness. Hematological: Bruises/bleeds easily. Psychiatric/Behavioral: Negative. Objective:      /74 (BP Location: Left arm, Patient Position: Sitting, Cuff Size: Standard)   Pulse 70   Temp (!) 97.2 °F (36.2 °C) (Temporal)   Ht 5' 5" (1.651 m)   Wt 83.5 kg (184 lb)   SpO2 99%   BMI 30.62 kg/m²          Physical Exam  Cardiovascular:      Rate and Rhythm: Normal rate and regular rhythm. Pulses:           Radial pulses are 2+ on the right side and 2+ on the left side. Dorsalis pedis pulses are 0 on the right side and 0 on the left side. Posterior tibial pulses are 2+ on the right side and 2+ on the left side. Heart sounds: No murmur heard. Pulmonary:      Effort: No respiratory distress. Breath sounds: No wheezing or rales. Abdominal:      Palpations: Abdomen is soft. Tenderness: There is no abdominal tenderness. There is no guarding or rebound. Musculoskeletal:      Right lower le+ Edema present. Left lower leg: No edema.    Skin:     Comments: Very large varicosities over the R thigh and leg with no stasis changes or sounds           I have reviewed and made appropriate changes to the review of systems input by the medical assistant.     Vitals:    10/10/23 0850   BP: 138/74   BP Location: Left arm   Patient Position: Sitting   Cuff Size: Standard   Pulse: 70   Temp: (!) 97.2 °F (36.2 °C)   TempSrc: Temporal   SpO2: 99%   Weight: 83.5 kg (184 lb)   Height: 5' 5" (1.651 m)       Patient Active Problem List   Diagnosis   • Sleep disorder   • Palpitations   • Hyperlipidemia   • Esophageal dysphagia   • Hiatal hernia with GERD   • Irritable bowel syndrome with both constipation and diarrhea   • Leukopenia   • Occipital neuralgia of right side   • Primary hypertension   • Upper abdominal pain of unknown etiology       Past Surgical History:   Procedure Laterality Date   • HYSTERECTOMY     • VARICOSE VEIN SURGERY Right        Family History   Problem Relation Age of Onset   • Hypertension Mother    • Hypertension Father    • Hypertension Maternal Grandmother        Social History     Socioeconomic History   • Marital status: /Civil Union     Spouse name: Not on file   • Number of children: Not on file   • Years of education: Not on file   • Highest education level: Not on file   Occupational History   • Not on file   Tobacco Use   • Smoking status: Former     Packs/day: 1.00     Years: 20.00     Total pack years: 20.00     Types: Cigarettes     Start date: 1984     Quit date: 2001     Years since quittin.7   • Smokeless tobacco: Never   Vaping Use   • Vaping Use: Never used   Substance and Sexual Activity   • Alcohol use: No   • Drug use: No   • Sexual activity: Yes     Partners: Male     Birth control/protection: Surgical   Other Topics Concern   • Not on file   Social History Narrative   • Not on file     Social Determinants of Health     Financial Resource Strain: Not on file   Food Insecurity: Not on file   Transportation Needs: Not on file   Physical Activity: Not on file Stress: Not on file   Social Connections: Not on file   Intimate Partner Violence: Not on file   Housing Stability: Not on file       Allergies   Allergen Reactions   • Metronidazole Other (See Comments)     disoriented         Current Outpatient Medications:   •  diphenhydrAMINE (BENADRYL) 50 mg capsule, Take 50 mg by mouth every 6 (six) hours as needed for itching, Disp: , Rfl:   •  Multiple Vitamins-Minerals (MULTIVITAMIN WITH MINERALS) tablet, Take 1 tablet by mouth daily, Disp: , Rfl:   •  Omega-3 Fatty Acids (fish oil) 1,000 mg, Take 2 g by mouth 2 (two) times a day, Disp: , Rfl:   •  omeprazole (PriLOSEC) 20 mg delayed release capsule, , Disp: , Rfl:   •  valsartan (DIOVAN) 80 mg tablet, Take 1 tablet (80 mg total) by mouth daily, Disp: 90 tablet, Rfl: 1  •  traZODone (DESYREL) 50 mg tablet, TAKE 1 TABLET BY MOUTH DAILY AT BEDTIME (Patient not taking: Reported on 8/30/2023), Disp: 90 tablet, Rfl: 1

## 2023-10-18 ENCOUNTER — VBI (OUTPATIENT)
Dept: ADMINISTRATIVE | Facility: OTHER | Age: 58
End: 2023-10-18

## 2023-11-01 ENCOUNTER — HOSPITAL ENCOUNTER (OUTPATIENT)
Dept: RADIOLOGY | Facility: HOSPITAL | Age: 58
Discharge: HOME | End: 2023-11-01
Payer: COMMERCIAL

## 2023-11-01 DIAGNOSIS — R10.9 ABDOMINAL PAIN, UNSPECIFIED ABDOMINAL LOCATION: ICD-10-CM

## 2023-11-01 PROCEDURE — 74022 RADEX COMPL AQT ABD SERIES: CPT

## 2023-11-21 ENCOUNTER — APPOINTMENT (OUTPATIENT)
Dept: LAB | Facility: CLINIC | Age: 58
End: 2023-11-21
Payer: COMMERCIAL

## 2023-11-21 ENCOUNTER — RA CDI HCC (OUTPATIENT)
Dept: OTHER | Facility: HOSPITAL | Age: 58
End: 2023-11-21

## 2023-11-21 DIAGNOSIS — R19.5 ABNORMAL FECES: ICD-10-CM

## 2023-11-21 DIAGNOSIS — K21.9 GASTROESOPHAGEAL REFLUX DISEASE WITHOUT ESOPHAGITIS: ICD-10-CM

## 2023-11-21 DIAGNOSIS — R19.7 DIARRHEA OF PRESUMED INFECTIOUS ORIGIN: ICD-10-CM

## 2023-11-21 DIAGNOSIS — R14.0 GASTRIC TYMPANY: ICD-10-CM

## 2023-11-21 DIAGNOSIS — R10.9 ABDOMINAL PAIN, UNSPECIFIED ABDOMINAL LOCATION: ICD-10-CM

## 2023-11-21 DIAGNOSIS — K52.839 MICROSCOPIC COLITIS, UNSPECIFIED MICROSCOPIC COLITIS TYPE: ICD-10-CM

## 2023-11-21 LAB
CRP SERPL QL: 4.5 MG/L
ERYTHROCYTE [SEDIMENTATION RATE] IN BLOOD: 10 MM/HOUR (ref 0–29)

## 2023-11-21 PROCEDURE — 36415 COLL VENOUS BLD VENIPUNCTURE: CPT

## 2023-11-21 PROCEDURE — 85652 RBC SED RATE AUTOMATED: CPT

## 2023-11-21 PROCEDURE — 86037 ANCA TITER EACH ANTIBODY: CPT

## 2023-11-21 PROCEDURE — 83520 IMMUNOASSAY QUANT NOS NONAB: CPT

## 2023-11-21 PROCEDURE — 86140 C-REACTIVE PROTEIN: CPT

## 2023-11-21 PROCEDURE — 83993 ASSAY FOR CALPROTECTIN FECAL: CPT

## 2023-11-21 NOTE — PROGRESS NOTES
[address] [facility] coding opportunities       Chart reviewed, no opportunity found: CHART REVIEWED, NO OPPORTUNITY FOUND        Patients Insurance        Commercial Insurance: [employer]

## 2023-11-24 LAB
C-ANCA TITR SER IF: NORMAL TITER
MYELOPEROXIDASE AB SER IA-ACNC: <0.2 UNITS (ref 0–0.9)
P-ANCA ATYPICAL TITR SER IF: NORMAL TITER
P-ANCA TITR SER IF: NORMAL TITER
PROTEINASE3 AB SER IA-ACNC: <0.2 UNITS (ref 0–0.9)

## 2023-11-27 LAB — CALPROTECTIN STL-MCNT: 35 UG/G (ref 0–120)

## 2023-11-28 ENCOUNTER — APPOINTMENT (OUTPATIENT)
Dept: LAB | Facility: CLINIC | Age: 58
End: 2023-11-28
Payer: COMMERCIAL

## 2023-11-28 DIAGNOSIS — R19.5 ABNORMAL FECES: ICD-10-CM

## 2023-11-28 DIAGNOSIS — R19.7 DIARRHEA OF PRESUMED INFECTIOUS ORIGIN: ICD-10-CM

## 2023-11-28 DIAGNOSIS — K52.9 INFLAMMATORY BOWEL DISEASE: ICD-10-CM

## 2023-11-28 DIAGNOSIS — R79.82 ELEVATED C-REACTIVE PROTEIN (CRP): ICD-10-CM

## 2023-11-28 DIAGNOSIS — K52.839 MICROSCOPIC COLITIS, UNSPECIFIED MICROSCOPIC COLITIS TYPE: ICD-10-CM

## 2023-11-28 DIAGNOSIS — K21.9 GASTROESOPHAGEAL REFLUX DISEASE WITHOUT ESOPHAGITIS: ICD-10-CM

## 2023-11-28 DIAGNOSIS — R10.9 ABDOMINAL PAIN, UNSPECIFIED ABDOMINAL LOCATION: ICD-10-CM

## 2023-11-28 DIAGNOSIS — R14.0 GASTRIC TYMPANY: ICD-10-CM

## 2023-11-28 LAB
HAV AB SER QL IA: NORMAL
HAV IGM SER QL: NORMAL
HBV CORE IGM SER QL: NORMAL
HBV SURFACE AB SER-ACNC: 163 MIU/ML
HBV SURFACE AG SER QL: NORMAL
HCV AB SER QL: NORMAL

## 2023-11-28 PROCEDURE — 86706 HEP B SURFACE ANTIBODY: CPT

## 2023-11-28 PROCEDURE — 36415 COLL VENOUS BLD VENIPUNCTURE: CPT

## 2023-11-28 PROCEDURE — 86480 TB TEST CELL IMMUN MEASURE: CPT

## 2023-11-28 PROCEDURE — 86708 HEPATITIS A ANTIBODY: CPT

## 2023-11-28 PROCEDURE — 80074 ACUTE HEPATITIS PANEL: CPT

## 2023-11-29 ENCOUNTER — OFFICE VISIT (OUTPATIENT)
Dept: FAMILY MEDICINE CLINIC | Facility: CLINIC | Age: 58
End: 2023-11-29
Payer: COMMERCIAL

## 2023-11-29 VITALS
BODY MASS INDEX: 30.66 KG/M2 | DIASTOLIC BLOOD PRESSURE: 100 MMHG | HEIGHT: 65 IN | OXYGEN SATURATION: 99 % | HEART RATE: 69 BPM | SYSTOLIC BLOOD PRESSURE: 152 MMHG | WEIGHT: 184 LBS | TEMPERATURE: 97.8 F

## 2023-11-29 DIAGNOSIS — I10 PRIMARY HYPERTENSION: ICD-10-CM

## 2023-11-29 DIAGNOSIS — Z13.31 DEPRESSION SCREENING NEGATIVE: ICD-10-CM

## 2023-11-29 DIAGNOSIS — Z13.29 SCREENING FOR THYROID DISORDER: ICD-10-CM

## 2023-11-29 DIAGNOSIS — Z11.4 SCREENING FOR HIV (HUMAN IMMUNODEFICIENCY VIRUS): Primary | ICD-10-CM

## 2023-11-29 DIAGNOSIS — E78.2 MIXED HYPERLIPIDEMIA: ICD-10-CM

## 2023-11-29 DIAGNOSIS — Z13.1 SCREENING FOR DIABETES MELLITUS (DM): ICD-10-CM

## 2023-11-29 DIAGNOSIS — Z13.220 SCREENING FOR LIPID DISORDERS: ICD-10-CM

## 2023-11-29 DIAGNOSIS — Z23 ENCOUNTER FOR VACCINATION: ICD-10-CM

## 2023-11-29 DIAGNOSIS — Z13.0 SCREENING FOR DEFICIENCY ANEMIA: ICD-10-CM

## 2023-11-29 DIAGNOSIS — Z23 ENCOUNTER FOR IMMUNIZATION: ICD-10-CM

## 2023-11-29 LAB
GAMMA INTERFERON BACKGROUND BLD IA-ACNC: <0 IU/ML
M TB IFN-G BLD-IMP: NEGATIVE
M TB IFN-G CD4+ BCKGRND COR BLD-ACNC: 0.01 IU/ML
M TB IFN-G CD4+ BCKGRND COR BLD-ACNC: 0.01 IU/ML
MITOGEN IGNF BCKGRD COR BLD-ACNC: 10 IU/ML

## 2023-11-29 PROCEDURE — 90471 IMMUNIZATION ADMIN: CPT

## 2023-11-29 PROCEDURE — 90686 IIV4 VACC NO PRSV 0.5 ML IM: CPT

## 2023-11-29 PROCEDURE — 99213 OFFICE O/P EST LOW 20 MIN: CPT | Performed by: NURSE PRACTITIONER

## 2023-11-29 RX ORDER — VALSARTAN 160 MG/1
160 TABLET ORAL DAILY
Qty: 30 TABLET | Refills: 1 | Status: SHIPPED | OUTPATIENT
Start: 2023-12-06

## 2023-11-29 NOTE — PROGRESS NOTES
Assessment/Plan:    Problem List Items Addressed This Visit     Hyperlipidemia    Primary hypertension    Relevant Medications    valsartan (DIOVAN) 160 mg tablet (Start on 12/6/2023)   Other Visit Diagnoses     Screening for HIV (human immunodeficiency virus)    -  Primary    Relevant Orders    HIV 1/2 AG/AB w Reflex [facility] for 2 yr old and above    Encounter for immunization        Depression screening negative        Screening for deficiency anemia        Relevant Orders    CBC and differential    Screening for diabetes mellitus (DM)        Relevant Orders    Comprehensive metabolic panel    Screening for lipid disorders        Relevant Orders    Lipid Panel with Direct LDL reflex    Screening for thyroid disorder        Relevant Orders    TSH, 3rd generation with Free T4 reflex    Encounter for vaccination        Relevant Orders    influenza vaccine, quadrivalent, 0.5 mL, preservative-free, for adult and pediatric patients 6 mos+ (AFLURIA, FLUARIX, FLULAVAL, FLUZONE) (Completed)           Diagnoses and all orders for this visit:    Screening for HIV (human immunodeficiency virus)  -     HIV 1/2 AG/AB w Reflex [facility] for 2 yr old and above; Future    Encounter for immunization    Depression screening negative    Mixed hyperlipidemia    Primary hypertension  -     valsartan (DIOVAN) 160 mg tablet; Take 1 tablet (160 mg total) by mouth daily Do not start before December 6, 2023. Screening for deficiency anemia  -     CBC and differential; Future    Screening for diabetes mellitus (DM)  -     Comprehensive metabolic panel; Future    Screening for lipid disorders  -     Lipid Panel with Direct LDL reflex; Future    Screening for thyroid disorder  -     TSH, 3rd generation with Free T4 reflex;  Future    Encounter for vaccination  -     influenza vaccine, quadrivalent, 0.5 mL, preservative-free, for adult and pediatric patients 6 mos+ (AFLURIA, FLUARIX, FLULAVAL, FLUZONE)        No problem-specific Assessment & Plan notes found for this encounter. Subjective:      Patient ID: Gerardo Eaton is a 66 y.o. female. Patient presents with: Follow-up: Pt states her bp has been running high. Average BP upper 140's/80's    Hypertension  This is a chronic problem. The problem is uncontrolled. There are no associated agents to hypertension. Risk factors for coronary artery disease include post-menopausal state. Past treatments include angiotensin blockers. The current treatment provides no improvement. There are no compliance problems. There is no history of angina, kidney disease or CAD/MI. There is no history of chronic renal disease. The following portions of the patient's history were reviewed and updated as appropriate:   She has a past medical history of DVT of lower extremity (deep venous thrombosis) ([address]) (06/01/2016), GERD (gastroesophageal reflux disease) (2016), Hyperlipidemia, Hypertension (2017), Inflammatory bowel disease (2022), Kidney stone, and Raynaud disease. ,  does not have any pertinent problems on file. ,   has a past surgical history that includes Hysterectomy (1997) and Varicose vein surgery (Right). ,  family history includes Hypertension in her father, maternal grandmother, and mother. ,   reports that she quit smoking about 22 years ago. Her smoking use included cigarettes. She started smoking about 39 years ago. She has a 20.00 pack-year smoking history. She has never used smokeless tobacco. She reports that she does not drink alcohol and does not use drugs. ,  is allergic to metronidazole. .  Current Outpatient Medications   Medication Sig Dispense Refill   • diphenhydrAMINE (BENADRYL) 50 mg capsule Take 50 mg by mouth every 6 (six) hours as needed for itching     • Multiple Vitamins-Minerals (MULTIVITAMIN WITH MINERALS) tablet Take 1 tablet by mouth daily     • Omega-3 Fatty Acids (fish oil) 1,000 mg Take 2 g by mouth 2 (two) times a day     • omeprazole (PriLOSEC) 20 mg delayed release capsule • [START ON 12/6/2023] valsartan (DIOVAN) 160 mg tablet Take 1 tablet (160 mg total) by mouth daily Do not start before December 6, 2023. 30 tablet 1   • traZODone (DESYREL) 50 mg tablet TAKE 1 TABLET BY MOUTH DAILY AT BEDTIME (Patient not taking: Reported on 8/30/2023) 90 tablet 1     No current facility-administered medications for this visit. Depression Screening and Follow-up Plan: Patient was screened for depression during today's encounter. They screened negative with a PHQ-2 score of 0. Review of Systems   All other systems reviewed and are negative. Objective:  Vitals:    11/29/23 0653   BP: 152/100   Pulse: 69   Temp: 97.8 °F (36.6 °C)   TempSrc: Tympanic   SpO2: 99%   Weight: 83.5 kg (184 lb)   Height: 5' 5" (1.651 m)     Body mass index is 30.62 kg/m². Physical Exam  Vitals and nursing note reviewed. Constitutional:       Appearance: Normal appearance. She is well-developed. HENT:      Head: Normocephalic and atraumatic. Right Ear: Tympanic membrane, ear canal and external ear normal.      Left Ear: Tympanic membrane, ear canal and external ear normal.      Nose: Nose normal.      Mouth/Throat:      Mouth: Mucous membranes are moist.      Pharynx: Uvula midline. Eyes:      General: Lids are normal.      Conjunctiva/sclera: Conjunctivae normal.      Pupils: Pupils are equal, round, and reactive to light. Neck:      Thyroid: No thyroid mass or thyromegaly. Vascular: No JVD. Trachea: Trachea and phonation normal.   Cardiovascular:      Rate and Rhythm: Normal rate and regular rhythm. Pulses: Normal pulses. Heart sounds: Normal heart sounds, S1 normal and S2 normal. No murmur heard. No friction rub. No gallop. Pulmonary:      Effort: Pulmonary effort is normal.      Breath sounds: Normal breath sounds. Abdominal:      General: Bowel sounds are normal.      Palpations: Abdomen is soft. Tenderness: There is no abdominal tenderness. Genitourinary:     Comments: Deferred   Musculoskeletal:         General: Normal range of motion. Cervical back: Full passive range of motion without pain, normal range of motion and neck supple. Right lower leg: No edema. Left lower leg: No edema. Lymphadenopathy:      Head:      Right side of head: No submental, submandibular, tonsillar, preauricular, posterior auricular or occipital adenopathy. Left side of head: No submental, submandibular, tonsillar, preauricular, posterior auricular or occipital adenopathy. Cervical: No cervical adenopathy. Skin:     General: Skin is warm and dry. Capillary Refill: Capillary refill takes less than 2 seconds. Neurological:      General: No focal deficit present. Mental Status: She is alert and oriented to person, place, and time. Cranial Nerves: No cranial nerve deficit. Sensory: Sensation is intact. Motor: Motor function is intact. Coordination: Coordination is intact. Gait: Gait is intact. Deep Tendon Reflexes: Reflexes are normal and symmetric. Psychiatric:         Attention and Perception: Attention and perception normal.         Mood and Affect: Mood and affect normal.         Speech: Speech normal.         Behavior: Behavior normal. Behavior is cooperative. Thought Content: Thought content normal.         Cognition and Memory: Cognition normal.         Judgment: Judgment normal.           Portions of the record may have been created with voice recognition software. Occasional wrong word or "sound a like" substitutions may have occurred due to the inherent limitations of voice recognition software. Read the chart carefully and recognize, using context, where substitutions have occurred. Contact me with any questions.

## 2023-12-21 ENCOUNTER — OFFICE VISIT (OUTPATIENT)
Dept: FAMILY MEDICINE CLINIC | Facility: CLINIC | Age: 58
End: 2023-12-21
Payer: COMMERCIAL

## 2023-12-21 VITALS
OXYGEN SATURATION: 100 % | BODY MASS INDEX: 30.99 KG/M2 | TEMPERATURE: 98.6 F | SYSTOLIC BLOOD PRESSURE: 162 MMHG | HEIGHT: 65 IN | WEIGHT: 186 LBS | DIASTOLIC BLOOD PRESSURE: 96 MMHG | HEART RATE: 64 BPM

## 2023-12-21 DIAGNOSIS — I10 PRIMARY HYPERTENSION: Primary | ICD-10-CM

## 2023-12-21 DIAGNOSIS — Z13.31 DEPRESSION SCREENING NEGATIVE: ICD-10-CM

## 2023-12-21 PROCEDURE — 99213 OFFICE O/P EST LOW 20 MIN: CPT | Performed by: NURSE PRACTITIONER

## 2023-12-21 RX ORDER — VALSARTAN 160 MG/1
160 TABLET ORAL DAILY
Qty: 30 TABLET | Refills: 1 | Status: SHIPPED | OUTPATIENT
Start: 2023-12-21

## 2023-12-21 RX ORDER — AMLODIPINE BESYLATE 2.5 MG/1
2.5 TABLET ORAL DAILY
Qty: 30 TABLET | Refills: 1 | Status: SHIPPED | OUTPATIENT
Start: 2023-12-21

## 2023-12-21 NOTE — PROGRESS NOTES
Assessment/Plan:    Problem List Items Addressed This Visit     Primary hypertension - Primary    Relevant Medications    amLODIPine (NORVASC) 2.5 mg tablet    valsartan (DIOVAN) 160 mg tablet   Other Visit Diagnoses     Depression screening negative               Diagnoses and all orders for this visit:    Primary hypertension  -     amLODIPine (NORVASC) 2.5 mg tablet; Take 1 tablet (2.5 mg total) by mouth daily  -     valsartan (DIOVAN) 160 mg tablet; Take 1 tablet (160 mg total) by mouth daily    Depression screening negative        No problem-specific Assessment & Plan notes found for this encounter.        Subjective:      Patient ID: Luana Byers is a 58 y.o. female.    Pt presetns for HTN F/U.    Valsartan was increased from 80 mg QD to 160 mg QD. Initial BP is elevated and unexpected. Pt reports she is taking the valsartan in the evening but inquires if time of administration as she notes that her blood pressure seems to progressively increase throughout the day.  Per patient's home blood pressure records, she has been trending in the 150 systolic over 100s diastolic.  Will add on 2.5 mg of amlodipine, continue valsartan 160 mg daily and follow-up 1 month.    Hypertension  This is a chronic problem. The problem is uncontrolled. There are no associated agents to hypertension. There are no known risk factors for coronary artery disease. Past treatments include angiotensin blockers. The current treatment provides mild improvement. There are no compliance problems.  There is no history of angina, kidney disease or CAD/MI. There is no history of chronic renal disease.       The following portions of the patient's history were reviewed and updated as appropriate:   She has a past medical history of DVT of lower extremity (deep venous thrombosis) ([facility]) (06/01/2016), GERD (gastroesophageal reflux disease) (2016), Hyperlipidemia, Hypertension (2017), Inflammatory bowel disease (2022), Kidney stone, and Raynaud  "disease.,  does not have any pertinent problems on file.,   has a past surgical history that includes Hysterectomy (1997) and Varicose vein surgery (Right).,  family history includes Hypertension in her father, maternal grandmother, and mother.,   reports that she quit smoking about 22 years ago. Her smoking use included cigarettes. She started smoking about 39 years ago. She has a 20.0 pack-year smoking history. She has never used smokeless tobacco. She reports that she does not drink alcohol and does not use drugs.,  is allergic to metronidazole..  Current Outpatient Medications   Medication Sig Dispense Refill   • amLODIPine (NORVASC) 2.5 mg tablet Take 1 tablet (2.5 mg total) by mouth daily 30 tablet 1   • Multiple Vitamins-Minerals (MULTIVITAMIN WITH MINERALS) tablet Take 1 tablet by mouth daily     • Omega-3 Fatty Acids (fish oil) 1,000 mg Take 2 g by mouth 2 (two) times a day     • omeprazole (PriLOSEC) 20 mg delayed release capsule      • valsartan (DIOVAN) 160 mg tablet Take 1 tablet (160 mg total) by mouth daily 30 tablet 1   • diphenhydrAMINE (BENADRYL) 50 mg capsule Take 50 mg by mouth every 6 (six) hours as needed for itching (Patient not taking: Reported on 12/21/2023)     • traZODone (DESYREL) 50 mg tablet TAKE 1 TABLET BY MOUTH DAILY AT BEDTIME (Patient not taking: Reported on 8/30/2023) 90 tablet 1     No current facility-administered medications for this visit.       Depression Screening and Follow-up Plan: Patient was screened for depression during today's encounter. They screened negative with a PHQ-2 score of 0.          Review of Systems   All other systems reviewed and are negative.        Objective:  Vitals:    12/21/23 0655   BP: 162/96   Pulse: 64   Temp: 98.6 °F (37 °C)   TempSrc: Tympanic   SpO2: 100%   Weight: 84.4 kg (186 lb)   Height: 5' 5\" (1.651 m)     Body mass index is 30.95 kg/m².     Physical Exam  Vitals and nursing note reviewed.   HENT:      Head: Normocephalic. " "  Cardiovascular:      Rate and Rhythm: Normal rate.   Pulmonary:      Effort: Pulmonary effort is normal.   Skin:     General: Skin is warm and dry.   Neurological:      General: No focal deficit present.      Mental Status: She is alert.   Psychiatric:         Mood and Affect: Mood normal.           Portions of the record may have been created with voice recognition software. Occasional wrong word or \"sound a like\" substitutions may have occurred due to the inherent limitations of voice recognition software. Read the chart carefully and recognize, using context, where substitutions have occurred. Contact me with any questions.  "

## 2023-12-28 LAB — MISCELLANEOUS LAB TEST RESULT: NORMAL

## 2024-01-12 DIAGNOSIS — I10 PRIMARY HYPERTENSION: ICD-10-CM

## 2024-01-12 RX ORDER — AMLODIPINE BESYLATE 2.5 MG/1
2.5 TABLET ORAL DAILY
Qty: 90 TABLET | Refills: 1 | Status: SHIPPED | OUTPATIENT
Start: 2024-01-12

## 2024-01-19 DIAGNOSIS — I10 PRIMARY HYPERTENSION: ICD-10-CM

## 2024-01-19 RX ORDER — VALSARTAN 160 MG/1
160 TABLET ORAL DAILY
Qty: 90 TABLET | Refills: 1 | Status: SHIPPED | OUTPATIENT
Start: 2024-01-19

## 2024-01-22 DIAGNOSIS — I10 PRIMARY HYPERTENSION: ICD-10-CM

## 2024-01-22 RX ORDER — VALSARTAN 160 MG/1
160 TABLET ORAL DAILY
Qty: 90 TABLET | Refills: 1 | OUTPATIENT
Start: 2024-01-22

## 2024-01-22 RX ORDER — AMLODIPINE BESYLATE 2.5 MG/1
2.5 TABLET ORAL DAILY
Qty: 90 TABLET | Refills: 1 | OUTPATIENT
Start: 2024-01-22

## 2024-01-30 ENCOUNTER — OFFICE VISIT (OUTPATIENT)
Dept: FAMILY MEDICINE CLINIC | Facility: CLINIC | Age: 59
End: 2024-01-30
Payer: COMMERCIAL

## 2024-01-30 VITALS
DIASTOLIC BLOOD PRESSURE: 72 MMHG | SYSTOLIC BLOOD PRESSURE: 124 MMHG | HEIGHT: 65 IN | WEIGHT: 185.5 LBS | TEMPERATURE: 97.8 F | BODY MASS INDEX: 30.91 KG/M2 | OXYGEN SATURATION: 98 % | HEART RATE: 67 BPM

## 2024-01-30 DIAGNOSIS — I10 PRIMARY HYPERTENSION: Primary | ICD-10-CM

## 2024-01-30 PROCEDURE — 99213 OFFICE O/P EST LOW 20 MIN: CPT | Performed by: NURSE PRACTITIONER

## 2024-01-30 NOTE — PROGRESS NOTES
Assessment/Plan:    Problem List Items Addressed This Visit     Primary hypertension - Primary        Diagnoses and all orders for this visit:    Primary hypertension        No problem-specific Assessment & Plan notes found for this encounter.        Subjective:      Patient ID: Luana Byers is a 58 y.o. female.    Patient presents with:  Follow-up: Follow up on BP medication.    Pt HTN regimen was changed 1 month ago 2/2 uncontrolled BP. BP is better maintained, Pt denies HTN symptomology.    Hypertension  This is a chronic problem. The problem is controlled. There are no associated agents to hypertension. Risk factors for coronary artery disease include post-menopausal state. Past treatments include calcium channel blockers and angiotensin blockers. The current treatment provides significant improvement. There are no compliance problems.  There is no history of angina, kidney disease or CAD/MI. There is no history of chronic renal disease.       The following portions of the patient's history were reviewed and updated as appropriate:   She has a past medical history of DVT of lower extremity (deep venous thrombosis) ([facility]) (06/01/2016), GERD (gastroesophageal reflux disease) (2016), Hyperlipidemia, Hypertension (2017), Inflammatory bowel disease (2022), Kidney stone, and Raynaud disease.,  does not have any pertinent problems on file.,   has a past surgical history that includes Hysterectomy (1997) and Varicose vein surgery (Right).,  family history includes Hypertension in her father, maternal grandmother, and mother.,   reports that she quit smoking about 23 years ago. Her smoking use included cigarettes. She started smoking about 40 years ago. She has a 20.0 pack-year smoking history. She has never used smokeless tobacco. She reports that she does not drink alcohol and does not use drugs.,  is allergic to metronidazole..  Current Outpatient Medications   Medication Sig Dispense Refill   • amLODIPine (NORVASC)  "2.5 mg tablet TAKE 1 TABLET BY MOUTH EVERY DAY 90 tablet 1   • Multiple Vitamins-Minerals (MULTIVITAMIN WITH MINERALS) tablet Take 1 tablet by mouth daily     • Omega-3 Fatty Acids (fish oil) 1,000 mg Take 2 g by mouth 2 (two) times a day     • omeprazole (PriLOSEC) 20 mg delayed release capsule      • valsartan (DIOVAN) 160 mg tablet TAKE 1 TABLET BY MOUTH EVERY DAY 90 tablet 1     No current facility-administered medications for this visit.       Depression Screening and Follow-up Plan: Patient was screened for depression during today's encounter. They screened negative with a PHQ-2 score of 0.          Review of Systems   All other systems reviewed and are negative.        Objective:  Vitals:    01/30/24 0710   BP: 124/72   Pulse: 67   Temp: 97.8 °F (36.6 °C)   TempSrc: Tympanic   SpO2: 98%   Weight: 84.1 kg (185 lb 8 oz)   Height: 5' 5\" (1.651 m)     Body mass index is 30.87 kg/m².     Physical Exam  Vitals and nursing note reviewed.   Constitutional:       Appearance: Normal appearance. She is well-developed.   HENT:      Head: Normocephalic and atraumatic.      Right Ear: Tympanic membrane, ear canal and external ear normal.      Left Ear: Tympanic membrane, ear canal and external ear normal.      Nose: Nose normal.      Mouth/Throat:      Mouth: Mucous membranes are moist.      Pharynx: Uvula midline.   Eyes:      General: Lids are normal.      Conjunctiva/sclera: Conjunctivae normal.      Pupils: Pupils are equal, round, and reactive to light.   Neck:      Thyroid: No thyroid mass or thyromegaly.      Vascular: No JVD.      Trachea: Trachea and phonation normal.   Cardiovascular:      Rate and Rhythm: Normal rate and regular rhythm.      Pulses: Normal pulses.      Heart sounds: Normal heart sounds, S1 normal and S2 normal. No murmur heard.     No friction rub. No gallop.   Pulmonary:      Effort: Pulmonary effort is normal.      Breath sounds: Normal breath sounds.   Abdominal:      General: Bowel sounds " "are normal.      Palpations: Abdomen is soft.      Tenderness: There is no abdominal tenderness.   Genitourinary:     Comments: Deferred   Musculoskeletal:         General: Normal range of motion.      Cervical back: Full passive range of motion without pain, normal range of motion and neck supple.      Right lower leg: No edema.      Left lower leg: No edema.   Lymphadenopathy:      Head:      Right side of head: No submental, submandibular, tonsillar, preauricular, posterior auricular or occipital adenopathy.      Left side of head: No submental, submandibular, tonsillar, preauricular, posterior auricular or occipital adenopathy.      Cervical: No cervical adenopathy.   Skin:     General: Skin is warm and dry.      Capillary Refill: Capillary refill takes less than 2 seconds.   Neurological:      General: No focal deficit present.      Mental Status: She is alert and oriented to person, place, and time.      Cranial Nerves: No cranial nerve deficit.      Sensory: Sensation is intact.      Motor: Motor function is intact.      Coordination: Coordination is intact.      Gait: Gait is intact.      Deep Tendon Reflexes: Reflexes are normal and symmetric.   Psychiatric:         Attention and Perception: Attention and perception normal.         Mood and Affect: Mood and affect normal.         Speech: Speech normal.         Behavior: Behavior normal. Behavior is cooperative.         Thought Content: Thought content normal.         Cognition and Memory: Cognition normal.         Judgment: Judgment normal.           Portions of the record may have been created with voice recognition software. Occasional wrong word or \"sound a like\" substitutions may have occurred due to the inherent limitations of voice recognition software. Read the chart carefully and recognize, using context, where substitutions have occurred. Contact me with any questions.  "

## 2024-03-11 ENCOUNTER — OFFICE VISIT (OUTPATIENT)
Dept: FAMILY MEDICINE CLINIC | Facility: CLINIC | Age: 59
End: 2024-03-11
Payer: COMMERCIAL

## 2024-03-11 VITALS
BODY MASS INDEX: 30.99 KG/M2 | HEART RATE: 66 BPM | WEIGHT: 186 LBS | DIASTOLIC BLOOD PRESSURE: 84 MMHG | HEIGHT: 65 IN | OXYGEN SATURATION: 99 % | SYSTOLIC BLOOD PRESSURE: 132 MMHG | TEMPERATURE: 96.7 F

## 2024-03-11 DIAGNOSIS — R10.10 UPPER ABDOMINAL PAIN OF UNKNOWN ETIOLOGY: ICD-10-CM

## 2024-03-11 DIAGNOSIS — K58.2 IRRITABLE BOWEL SYNDROME WITH BOTH CONSTIPATION AND DIARRHEA: ICD-10-CM

## 2024-03-11 DIAGNOSIS — K21.9 HIATAL HERNIA WITH GERD: ICD-10-CM

## 2024-03-11 DIAGNOSIS — R10.9 RECURRENT ABDOMINAL PAIN: Primary | ICD-10-CM

## 2024-03-11 DIAGNOSIS — R13.19 ESOPHAGEAL DYSPHAGIA: ICD-10-CM

## 2024-03-11 DIAGNOSIS — K44.9 HIATAL HERNIA WITH GERD: ICD-10-CM

## 2024-03-11 PROCEDURE — 99213 OFFICE O/P EST LOW 20 MIN: CPT | Performed by: NURSE PRACTITIONER

## 2024-03-11 NOTE — PROGRESS NOTES
Assessment/Plan:    Problem List Items Addressed This Visit     Esophageal dysphagia    Hiatal hernia with GERD    Irritable bowel syndrome with both constipation and diarrhea    Upper abdominal pain of unknown etiology   Other Visit Diagnoses     Recurrent abdominal pain    -  Primary             Diagnoses and all orders for this visit:    Recurrent abdominal pain    Esophageal dysphagia    Hiatal hernia with GERD    Irritable bowel syndrome with both constipation and diarrhea    Upper abdominal pain of unknown etiology    Other orders  -     Vedolizumab (ENTYVIO IV)        No problem-specific Assessment & Plan notes found for this encounter.        Subjective:      Patient ID: Luana Byers is a 58 y.o. female.    Patient presents with:  Pain: Upper R Rib pain.  Had a CT scan at the ER in Aug.  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 Recent diagnosis of Chron's. Pain worsens after eating,Pt is having protein shakes. Frustrated, feeling more questions than answers.    Pt has seen GI, Vascular, and General surgery r/t findings in CT of A/P dating August 2023 which revealed possible SMA and nutcracker anatomy. Pt saw Gen Surg whom did not feel that surgical intervention was indicate. Pt saw Vascular whom advised to delay possible Tx until Pt saw GI for suspected crohn's. Pt has since seen GI and started on Entyvio 1 week ago. Pt continues to report discomfort about the RUQ and LUQ approximately  30-60 minutes post postprandial. Pt will need to F/U with Vascular, recommending that she schedule this after a few more round os Entyvio as sequela of crohn's may contribute to Pt reported symptoms.     Abdominal Pain  This is a chronic problem. The current episode started more than 1 year ago. The onset quality is gradual. The problem occurs constantly. The most recent episode lasted 24 hours. The problem has been gradually worsening. The pain is located in the RUQ. The pain is at a severity of 7/10. The quality of the pain is  aching, dull and a sensation of fullness. The abdominal pain does not radiate. Associated symptoms include anorexia, diarrhea and frequency. Pertinent negatives include no arthralgias, belching, constipation, dysuria, fever, flatus, headaches, hematochezia, hematuria, melena, myalgias, nausea, vomiting or weight loss. The pain is aggravated by eating. The pain is relieved by Nothing. Prior diagnostic workup includes CT scan, GI consult, lower endoscopy and upper endoscopy.       The following portions of the patient's history were reviewed and updated as appropriate:   She has a past medical history of DVT of lower extremity (deep venous thrombosis) (HCC) (06/01/2016), GERD (gastroesophageal reflux disease) (2016), Hyperlipidemia, Hypertension (2017), Inflammatory bowel disease (2022), Kidney stone, and Raynaud disease.,  does not have any pertinent problems on file.,   has a past surgical history that includes Hysterectomy (1997) and Varicose vein surgery (Right).,  family history includes Hypertension in her father, maternal grandmother, and mother.,   reports that she quit smoking about 23 years ago. Her smoking use included cigarettes. She started smoking about 40 years ago. She has a 20 pack-year smoking history. She has never used smokeless tobacco. She reports that she does not drink alcohol and does not use drugs.,  is allergic to metronidazole..  Current Outpatient Medications   Medication Sig Dispense Refill   • amLODIPine (NORVASC) 2.5 mg tablet TAKE 1 TABLET BY MOUTH EVERY DAY 90 tablet 1   • Multiple Vitamins-Minerals (MULTIVITAMIN WITH MINERALS) tablet Take 1 tablet by mouth daily     • Omega-3 Fatty Acids (fish oil) 1,000 mg Take 2 g by mouth 2 (two) times a day     • omeprazole (PriLOSEC) 20 mg delayed release capsule      • valsartan (DIOVAN) 160 mg tablet TAKE 1 TABLET BY MOUTH EVERY DAY 90 tablet 1   • Vedolizumab (ENTYVIO IV)        No current facility-administered medications for this visit.  "           Review of Systems   Constitutional:  Negative for fever and weight loss.   Gastrointestinal:  Positive for abdominal pain, anorexia and diarrhea. Negative for constipation, flatus, hematochezia, melena, nausea and vomiting.   Genitourinary:  Positive for frequency. Negative for dysuria and hematuria.   Musculoskeletal:  Negative for arthralgias and myalgias.   Neurological:  Negative for headaches.   All other systems reviewed and are negative.        Objective:  Vitals:    03/11/24 0855   BP: 132/84   Pulse: 66   Temp: (!) 96.7 °F (35.9 °C)   TempSrc: Tympanic   SpO2: 99%   Weight: 84.4 kg (186 lb)   Height: 5' 5\" (1.651 m)     Body mass index is 30.95 kg/m².     Physical Exam  Vitals and nursing note reviewed.   Constitutional:       Appearance: Normal appearance. She is well-developed.   HENT:      Head: Normocephalic and atraumatic.      Right Ear: Tympanic membrane, ear canal and external ear normal.      Left Ear: Tympanic membrane, ear canal and external ear normal.      Nose: Nose normal.      Mouth/Throat:      Mouth: Mucous membranes are moist.      Pharynx: Uvula midline.   Eyes:      General: Lids are normal.      Conjunctiva/sclera: Conjunctivae normal.      Pupils: Pupils are equal, round, and reactive to light.   Neck:      Thyroid: No thyroid mass or thyromegaly.      Vascular: No JVD.      Trachea: Trachea and phonation normal.   Cardiovascular:      Rate and Rhythm: Normal rate and regular rhythm.      Pulses: Normal pulses.      Heart sounds: Normal heart sounds, S1 normal and S2 normal. No murmur heard.     No friction rub. No gallop.   Pulmonary:      Effort: Pulmonary effort is normal.      Breath sounds: Normal breath sounds.   Abdominal:      General: Bowel sounds are normal.      Palpations: Abdomen is soft.      Tenderness: There is no abdominal tenderness.       Genitourinary:     Comments: Deferred   Musculoskeletal:         General: Normal range of motion.      Cervical back: " "Full passive range of motion without pain, normal range of motion and neck supple.      Right lower leg: No edema.      Left lower leg: No edema.   Lymphadenopathy:      Head:      Right side of head: No submental, submandibular, tonsillar, preauricular, posterior auricular or occipital adenopathy.      Left side of head: No submental, submandibular, tonsillar, preauricular, posterior auricular or occipital adenopathy.      Cervical: No cervical adenopathy.   Skin:     General: Skin is warm and dry.      Capillary Refill: Capillary refill takes less than 2 seconds.   Neurological:      General: No focal deficit present.      Mental Status: She is alert and oriented to person, place, and time.      Cranial Nerves: No cranial nerve deficit.      Sensory: Sensation is intact.      Motor: Motor function is intact.      Coordination: Coordination is intact.      Gait: Gait is intact.      Deep Tendon Reflexes: Reflexes are normal and symmetric.   Psychiatric:         Attention and Perception: Attention and perception normal.         Mood and Affect: Mood and affect normal.         Speech: Speech normal.         Behavior: Behavior normal. Behavior is cooperative.         Thought Content: Thought content normal.         Cognition and Memory: Cognition normal.         Judgment: Judgment normal.           Portions of the record may have been created with voice recognition software. Occasional wrong word or \"sound a like\" substitutions may have occurred due to the inherent limitations of voice recognition software. Read the chart carefully and recognize, using context, where substitutions have occurred. Contact me with any questions.  "

## 2024-03-13 ENCOUNTER — HOSPITAL ENCOUNTER (EMERGENCY)
Facility: HOSPITAL | Age: 59
Discharge: HOME | End: 2024-03-13
Attending: FAMILY MEDICINE
Payer: COMMERCIAL

## 2024-03-13 ENCOUNTER — APPOINTMENT (EMERGENCY)
Dept: CT IMAGING | Facility: HOSPITAL | Age: 59
End: 2024-03-13
Payer: COMMERCIAL

## 2024-03-13 ENCOUNTER — APPOINTMENT (EMERGENCY)
Dept: ULTRASOUND IMAGING | Facility: HOSPITAL | Age: 59
End: 2024-03-13
Payer: COMMERCIAL

## 2024-03-13 VITALS
HEART RATE: 70 BPM | RESPIRATION RATE: 17 BRPM | SYSTOLIC BLOOD PRESSURE: 125 MMHG | TEMPERATURE: 98.6 F | OXYGEN SATURATION: 98 % | DIASTOLIC BLOOD PRESSURE: 57 MMHG

## 2024-03-13 DIAGNOSIS — R10.9 ABDOMINAL PAIN: Primary | ICD-10-CM

## 2024-03-13 LAB
ALBUMIN SERPL BCP-MCNC: 4.4 G/DL (ref 3.5–5)
ALP SERPL-CCNC: 54 U/L (ref 34–104)
ALT SERPL W P-5'-P-CCNC: 23 U/L (ref 7–52)
ANION GAP SERPL CALCULATED.3IONS-SCNC: 10 MMOL/L (ref 4–13)
AST SERPL W P-5'-P-CCNC: 21 U/L (ref 13–39)
BASOPHILS # BLD AUTO: 0.05 THOUSANDS/ÂΜL (ref 0–0.1)
BASOPHILS NFR BLD AUTO: 1 % (ref 0–1)
BILIRUB SERPL-MCNC: 0.4 MG/DL (ref 0.2–1)
BILIRUB UR QL STRIP: NEGATIVE
BUN SERPL-MCNC: 18 MG/DL (ref 5–25)
CALCIUM SERPL-MCNC: 9.6 MG/DL (ref 8.4–10.2)
CHLORIDE SERPL-SCNC: 103 MMOL/L (ref 96–108)
CLARITY UR: CLEAR
CO2 SERPL-SCNC: 26 MMOL/L (ref 21–32)
COLOR UR: YELLOW
CREAT SERPL-MCNC: 0.72 MG/DL (ref 0.6–1.3)
EOSINOPHIL # BLD AUTO: 0.15 THOUSAND/ÂΜL (ref 0–0.61)
EOSINOPHIL NFR BLD AUTO: 3 % (ref 0–6)
ERYTHROCYTE [DISTWIDTH] IN BLOOD BY AUTOMATED COUNT: 12.3 % (ref 11.6–15.1)
GFR SERPL CREATININE-BSD FRML MDRD: 93 ML/MIN/1.73SQ M
GLUCOSE SERPL-MCNC: 93 MG/DL (ref 65–140)
GLUCOSE UR STRIP-MCNC: NEGATIVE MG/DL
HCT VFR BLD AUTO: 41 % (ref 34.8–46.1)
HGB BLD-MCNC: 13.3 G/DL (ref 11.5–15.4)
HGB UR QL STRIP.AUTO: NEGATIVE
IMM GRANULOCYTES # BLD AUTO: 0.01 THOUSAND/UL (ref 0–0.2)
IMM GRANULOCYTES NFR BLD AUTO: 0 % (ref 0–2)
KETONES UR STRIP-MCNC: NEGATIVE MG/DL
LEUKOCYTE ESTERASE UR QL STRIP: NEGATIVE
LIPASE SERPL-CCNC: 50 U/L (ref 11–82)
LYMPHOCYTES # BLD AUTO: 1.34 THOUSANDS/ÂΜL (ref 0.6–4.47)
LYMPHOCYTES NFR BLD AUTO: 25 % (ref 14–44)
MCH RBC QN AUTO: 30.7 PG (ref 26.8–34.3)
MCHC RBC AUTO-ENTMCNC: 32.4 G/DL (ref 31.4–37.4)
MCV RBC AUTO: 95 FL (ref 82–98)
MONOCYTES # BLD AUTO: 0.44 THOUSAND/ÂΜL (ref 0.17–1.22)
MONOCYTES NFR BLD AUTO: 8 % (ref 4–12)
NEUTROPHILS # BLD AUTO: 3.3 THOUSANDS/ÂΜL (ref 1.85–7.62)
NEUTS SEG NFR BLD AUTO: 63 % (ref 43–75)
NITRITE UR QL STRIP: NEGATIVE
NRBC BLD AUTO-RTO: 0 /100 WBCS
PH UR STRIP.AUTO: 5.5 [PH]
PLATELET # BLD AUTO: 225 THOUSANDS/UL (ref 149–390)
PMV BLD AUTO: 10.7 FL (ref 8.9–12.7)
POTASSIUM SERPL-SCNC: 3.6 MMOL/L (ref 3.5–5.3)
PROT SERPL-MCNC: 7 G/DL (ref 6.4–8.4)
PROT UR STRIP-MCNC: NEGATIVE MG/DL
RBC # BLD AUTO: 4.33 MILLION/UL (ref 3.81–5.12)
SODIUM SERPL-SCNC: 139 MMOL/L (ref 135–147)
SP GR UR STRIP.AUTO: <=1.005
UROBILINOGEN UR QL STRIP.AUTO: 0.2 E.U./DL
WBC # BLD AUTO: 5.29 THOUSAND/UL (ref 4.31–10.16)

## 2024-03-13 PROCEDURE — 74177 CT ABD & PELVIS W/CONTRAST: CPT

## 2024-03-13 PROCEDURE — 80053 COMPREHEN METABOLIC PANEL: CPT | Performed by: FAMILY MEDICINE

## 2024-03-13 PROCEDURE — 76705 ECHO EXAM OF ABDOMEN: CPT

## 2024-03-13 PROCEDURE — 93005 ELECTROCARDIOGRAM TRACING: CPT

## 2024-03-13 PROCEDURE — 99284 EMERGENCY DEPT VISIT MOD MDM: CPT

## 2024-03-13 PROCEDURE — 85025 COMPLETE CBC W/AUTO DIFF WBC: CPT | Performed by: FAMILY MEDICINE

## 2024-03-13 PROCEDURE — 99285 EMERGENCY DEPT VISIT HI MDM: CPT | Performed by: FAMILY MEDICINE

## 2024-03-13 PROCEDURE — 83690 ASSAY OF LIPASE: CPT | Performed by: FAMILY MEDICINE

## 2024-03-13 PROCEDURE — 99214 OFFICE O/P EST MOD 30 MIN: CPT | Performed by: SURGERY

## 2024-03-13 PROCEDURE — 81003 URINALYSIS AUTO W/O SCOPE: CPT

## 2024-03-13 PROCEDURE — 96374 THER/PROPH/DIAG INJ IV PUSH: CPT

## 2024-03-13 PROCEDURE — 36415 COLL VENOUS BLD VENIPUNCTURE: CPT | Performed by: FAMILY MEDICINE

## 2024-03-13 RX ORDER — KETOROLAC TROMETHAMINE 30 MG/ML
15 INJECTION, SOLUTION INTRAMUSCULAR; INTRAVENOUS ONCE
Status: COMPLETED | OUTPATIENT
Start: 2024-03-13 | End: 2024-03-13

## 2024-03-13 RX ADMIN — IOHEXOL 100 ML: 350 INJECTION, SOLUTION INTRAVENOUS at 12:16

## 2024-03-13 RX ADMIN — KETOROLAC TROMETHAMINE 15 MG: 30 INJECTION, SOLUTION INTRAMUSCULAR; INTRAVENOUS at 13:52

## 2024-03-13 NOTE — CONSULTS
Consultation - General Surgery   Luana Byers 58 y.o. female MRN: [id number]  Unit/Bed#: ED 26 Encounter: [id number]    ASSESSMENT:  58-year-old female PMH GERD s/p endoscopic dilation, hypertension, hyperlipidemia, recent diagnosis of Crohn's disease, history of kidney stone, Raynaud's disease, history of DVT on 3 months of anticoagulation (2016) presents with right upper quadrant pain.    Right upper quadrant pain  -Afebrile, vital signs stable  -No leukocytosis  -No liver function test abnormalities  -CTAP with no acute abdominal pelvic abnormality, gallbladder unremarkable  -Right upper quadrant ultrasound unremarkable, no gallstones or sludge noted  -Previous history and notes reviewed.  Previously evaluated September 2023 by general surgery for possible SMA syndrome, however this was not found to be the case.  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  Could consider additional testing including HIDA scan for rare etiology of gallbladder disease.  Recommend outpatient follow-up with her GI team so they can see all her GI factors at play and whether or not this additional testing would be beneficial; this could get ordered as an outpatient.    SUBJECTIVE:    Chief Complaint: Right upper quadrant pain    HPI:    Pain: Abdominal pain  Duration/timing: Constant, intermittently worsens with severity  Location/radiation: Localized to the right upper quadrant, without radiation  Quality: Dull and aching when it is not severe, sharp when it severe  Severity: 6 out of 10 normally, worse 10 out of 10 with meals  Modifying Factors: Worsens after a meal  Associated signs/symptoms: Associated nausea at this time, no vomiting.  No change in bowel habits.  No urinary complaints.  Prior related hx: No prior history of gallstone disease.      ROS:  Review of Systems   Constitutional:  Negative for chills and fever.   HENT: Negative.     Eyes: Negative.    Respiratory: Negative.     Gastrointestinal:  Positive for abdominal pain, diarrhea (baseline) and nausea. Negative for abdominal distention, blood in stool, constipation and vomiting.   Genitourinary: Negative.    Musculoskeletal: Negative.    Neurological: Negative.    Psychiatric/Behavioral: Negative.         Historical Information   Past Medical History:   Diagnosis Date    DVT of lower extremity (deep venous thrombosis) ([facility]) 2016    was on elequis x 3 months    GERD (gastroesophageal reflux disease)     Endoscopy dilation 24    Hyperlipidemia     Hypertension 2017    Dizziness and hard to walk steady    Inflammatory bowel disease     Colonoscopy 9/15/22..microscopic colitis    Kidney stone     2mm stone found on left side..fluoroscopy 23    Raynaud disease      Past Surgical History:   Procedure Laterality Date    HYSTERECTOMY      VARICOSE VEIN SURGERY Right      Social History   Social History     Substance and Sexual Activity   Alcohol Use No     Social History     Substance and Sexual Activity   Drug Use No     Social History     Tobacco Use   Smoking Status Former    Current packs/day: 0.00    Average packs/day: 1 pack/day for 20.0 years (20.0 ttl pk-yrs)    Types: Cigarettes    Start date: 1984    Quit date: 2001    Years since quittin.2   Smokeless Tobacco Never       Family History:   Family History   Problem Relation Age of Onset    Hypertension Mother     Hypertension Father     Hypertension Maternal Grandmother        Meds/Allergies   all medications and allergies reviewed  Allergies   Allergen Reactions    Metronidazole Other (See Comments)     disoriented         OBJECTIVE:  First Vitals:   Blood Pressure: 167/79 (24 1128)  Pulse: 80 (24 1128)  Temperature: 98.6 °F (37 °C) (24 1128)  Respirations: 18 (24 1128)  SpO2:  98 % (03/13/24 1128) There is no height or weight on file to calculate BMI.  Current Vitals:   Blood Pressure: 125/57 (03/13/24 1230)  Pulse: 70 (03/13/24 1230)  Temperature: 98.6 °F (37 °C) (03/13/24 1128)  Respirations: 17 (03/13/24 1230)  SpO2: 98 % (03/13/24 1230)   I/Os:  No intake or output data in the 24 hours ending 03/13/24 1502  Lines/Drains:  Invasive Devices       Peripheral Intravenous Line  Duration             Peripheral IV 03/13/24 Left;Ventral (anterior) Forearm <1 day                    Physical Exam      Lab Results: I have personally reviewed pertinent lab results.    Recent Results (from the past 36 hour(s))   ECG 12 lead    Collection Time: 03/13/24 11:33 AM   Result Value Ref Range    Ventricular Rate 76 BPM    Atrial Rate 76 BPM    MD Interval 154 ms    QRSD Interval 84 ms    QT Interval 396 ms    QTC Interval 445 ms    P [place] 67 degrees    QRS Axis 8 degrees    T Wave Axis 69 degrees   CBC and differential    Collection Time: 03/13/24 11:38 AM   Result Value Ref Range    WBC 5.29 4.31 - 10.16 Thousand/uL    RBC 4.33 3.81 - 5.12 Million/uL    Hemoglobin 13.3 11.5 - 15.4 g/dL    Hematocrit 41.0 34.8 - 46.1 %    MCV 95 82 - 98 fL    MCH 30.7 26.8 - 34.3 pg    MCHC 32.4 31.4 - 37.4 g/dL    RDW 12.3 11.6 - 15.1 %    MPV 10.7 8.9 - 12.7 fL    Platelets 225 149 - 390 Thousands/uL    nRBC 0 /100 WBCs    Neutrophils Relative 63 43 - 75 %    Immature Grans % 0 0 - 2 %    Lymphocytes Relative 25 14 - 44 %    Monocytes Relative 8 4 - 12 %    Eosinophils Relative 3 0 - 6 %    Basophils Relative 1 0 - 1 %    Neutrophils Absolute 3.30 1.85 - 7.62 Thousands/µL    Absolute Immature Grans 0.01 0.00 - 0.20 Thousand/uL    Absolute Lymphocytes 1.34 0.60 - 4.47 Thousands/µL    Absolute Monocytes 0.44 0.17 - 1.22 Thousand/µL    Eosinophils Absolute 0.15 0.00 - 0.61 Thousand/µL    Basophils Absolute 0.05 0.00 - 0.10 Thousands/µL   Comprehensive metabolic panel    Collection Time: 03/13/24 11:38 AM   Result Value  Ref Range    Sodium 139 135 - 147 mmol/L    Potassium 3.6 3.5 - 5.3 mmol/L    Chloride 103 96 - 108 mmol/L    CO2 26 21 - 32 mmol/L    ANION GAP 10 4 - 13 mmol/L    BUN 18 5 - 25 mg/dL    Creatinine 0.72 0.60 - 1.30 mg/dL    Glucose 93 65 - 140 mg/dL    Calcium 9.6 8.4 - 10.2 mg/dL    AST 21 13 - 39 U/L    ALT 23 7 - 52 U/L    Alkaline Phosphatase 54 34 - 104 U/L    Total Protein 7.0 6.4 - 8.4 g/dL    Albumin 4.4 3.5 - 5.0 g/dL    Total Bilirubin 0.40 0.20 - 1.00 mg/dL    eGFR 93 ml/min/1.73sq m   Lipase    Collection Time: 03/13/24 11:38 AM   Result Value Ref Range    Lipase 50 11 - 82 u/L   UA w Reflex to Microscopic w Reflex to Culture    Collection Time: 03/13/24 12:49 PM    Specimen: Urine, Clean Catch   Result Value Ref Range    Color, UA Yellow Yellow, Straw    Clarity, UA Clear Hazy, Clear    Specific Gravity, UA <=1.005 (L) >1.005 - <1.030    pH, UA 5.5 5.0, 5.5, 6.0, 6.5, 7.0, 7.5    Leukocytes, UA Negative Negative    Nitrite, UA Negative Negative    Protein, UA Negative Negative, Interference- unable to analyze mg/dl    Glucose, UA Negative Negative mg/dl    Ketones, UA Negative Negative mg/dl    Urobilinogen, UA 0.2 0.2, 1.0 E.U./dl E.U./dl    Bilirubin, UA Negative Negative    Occult Blood, UA Negative Negative     Imaging: I have personally reviewed pertinent reports.    US right upper quadrant    Result Date: 3/13/2024  Impression: Normal. Workstation performed: [id number]     CT abdomen pelvis with contrast    Result Date: 3/13/2024  Impression: No acute abdominopelvic abnormality. Study initially reviewed and reported by Dr. Galvez. Workstation performed: [id number]     EKG, Pathology, and Other Studies: I have personally reviewed pertinent reports.        Trish Valenzuela PA-C  3/13/2024

## 2024-03-13 NOTE — ED PROVIDER NOTES
"History  Chief Complaint   Patient presents with    Rib Pain     Pt report right lower rib pain. Pt reports  \"it has been off and on for a while ,but is constant now.\" Pt reports some nausea with this.     Patient is a 58-year-old female past medical history of hyperlipidemia, hypertension, recent diagnosis of Crohn's, arriving for evaluation of right upper quadrant pain.  Patient states that this has been ongoing for months, and she follows with GI, and has follow-up with general surgery.  Patient denies any shortness of breath or chest pain.  Patient denies any fevers or chills.  Patient reports will become nauseous, no vomiting.  Patient states that food makes the pain worse.  Patient states that the pain is constant.  Patient states that she is diagnosed with Crohn's, was recently started on a therapy that she reports is helping with her discomfort.  Patient denies any diarrhea, change to her stool.  Patient denies any urinary symptoms.        Prior to Admission Medications   Prescriptions Last Dose Informant Patient Reported? Taking?   Multiple Vitamins-Minerals (MULTIVITAMIN WITH MINERALS) tablet 3/13/2024 Self Yes Yes   Sig: Take 1 tablet by mouth daily   Omega-3 Fatty Acids (fish oil) 1,000 mg 3/13/2024 Self Yes Yes   Sig: Take 2 g by mouth 2 (two) times a day   Vedolizumab (ENTYVIO IV) Past Week  Yes Yes   amLODIPine (NORVASC) 2.5 mg tablet 3/13/2024  No Yes   Sig: TAKE 1 TABLET BY MOUTH EVERY DAY   omeprazole (PriLOSEC) 20 mg delayed release capsule 3/13/2024 Self Yes Yes   valsartan (DIOVAN) 160 mg tablet 3/12/2024  No Yes   Sig: TAKE 1 TABLET BY MOUTH EVERY DAY      Facility-Administered Medications: None       Past Medical History:   Diagnosis Date    DVT of lower extremity (deep venous thrombosis) (HCC) 06/01/2016    was on elequis x 3 months    GERD (gastroesophageal reflux disease) 2016    Endoscopy dilation 8/2/22    Hyperlipidemia     Hypertension 2017    Dizziness and hard to walk steady    " Inflammatory bowel disease     Colonoscopy 9/15/22..microscopic colitis    Kidney stone     2mm stone found on left side..fluoroscopy 23    Raynaud disease        Past Surgical History:   Procedure Laterality Date    HYSTERECTOMY      VARICOSE VEIN SURGERY Right        Family History   Problem Relation Age of Onset    Hypertension Mother     Hypertension Father     Hypertension Maternal Grandmother      I have reviewed and agree with the history as documented.    E-Cigarette/Vaping    E-Cigarette Use Never User      E-Cigarette/Vaping Substances    Nicotine No     THC No     CBD No     Flavoring No     Other No     Unknown No      Social History     Tobacco Use    Smoking status: Former     Current packs/day: 0.00     Average packs/day: 1 pack/day for 20.0 years (20.0 ttl pk-yrs)     Types: Cigarettes     Start date: 1984     Quit date: 2001     Years since quittin.2    Smokeless tobacco: Never   Vaping Use    Vaping status: Never Used   Substance Use Topics    Alcohol use: No    Drug use: No       Review of Systems   Constitutional: Negative.    HENT: Negative.     Eyes: Negative.    Respiratory: Negative.     Cardiovascular: Negative.    Gastrointestinal:  Positive for abdominal pain and nausea.   Endocrine: Negative.    Genitourinary: Negative.    Musculoskeletal: Negative.    Skin: Negative.    Allergic/Immunologic: Negative.    Neurological: Negative.    Hematological: Negative.    Psychiatric/Behavioral: Negative.     All other systems reviewed and are negative.      Physical Exam  Physical Exam  Vitals and nursing note reviewed.   Constitutional:       Appearance: Normal appearance. She is normal weight.   HENT:      Head: Normocephalic.      Right Ear: External ear normal.      Left Ear: External ear normal.      Nose: Nose normal.      Mouth/Throat:      Mouth: Mucous membranes are moist.   Eyes:      Extraocular Movements: Extraocular movements intact.      Pupils: Pupils are  equal, round, and reactive to light.   Cardiovascular:      Rate and Rhythm: Normal rate and regular rhythm.      Pulses: Normal pulses.      Heart sounds: Normal heart sounds.   Pulmonary:      Effort: Pulmonary effort is normal.      Breath sounds: Normal breath sounds.   Abdominal:      General: Abdomen is flat. Bowel sounds are normal. There is no distension.      Palpations: Abdomen is soft. There is no mass.      Tenderness: There is abdominal tenderness. There is no right CVA tenderness, left CVA tenderness, guarding or rebound.      Hernia: No hernia is present.   Musculoskeletal:         General: Normal range of motion.      Cervical back: Normal range of motion and neck supple.   Skin:     General: Skin is warm.      Capillary Refill: Capillary refill takes less than 2 seconds.   Neurological:      General: No focal deficit present.      Mental Status: She is alert and oriented to person, place, and time. Mental status is at baseline.      GCS: GCS eye subscore is 4. GCS verbal subscore is 5. GCS motor subscore is 6.   Psychiatric:         Mood and Affect: Mood normal.         Behavior: Behavior normal.         Thought Content: Thought content normal.         Judgment: Judgment normal.         Vital Signs  ED Triage Vitals   Temperature Pulse Respirations Blood Pressure SpO2   03/13/24 1128 03/13/24 1128 03/13/24 1128 03/13/24 1128 03/13/24 1128   98.6 °F (37 °C) 80 18 167/79 98 %      Temp src Heart Rate Source Patient Position - Orthostatic VS BP Location FiO2 (%)   -- 03/13/24 1230 03/13/24 1230 03/13/24 1230 --    Monitor Sitting Right arm       Pain Score       03/13/24 1128       7           Vitals:    03/13/24 1128 03/13/24 1230   BP: 167/79 125/57   Pulse: 80 70   Patient Position - Orthostatic VS:  Sitting         Visual Acuity      ED Medications  Medications   iohexol (OMNIPAQUE) 350 MG/ML injection (MULTI-DOSE) 100 mL (100 mL Intravenous Given 3/13/24 1216)   ketorolac (TORADOL) injection 15 mg  (15 mg Intravenous Given 3/13/24 1352)       Diagnostic Studies  Results Reviewed       Procedure Component Value Units Date/Time    UA w Reflex to Microscopic w Reflex to Culture [[id number]]  (Abnormal) Collected: 03/13/24 1249    Lab Status: Final result Specimen: Urine, Clean Catch Updated: 03/13/24 1255     Color, UA Yellow     Clarity, UA Clear     Specific Gravity, UA <=1.005     pH, UA 5.5     Leukocytes, UA Negative     Nitrite, UA Negative     Protein, UA Negative mg/dl      Glucose, UA Negative mg/dl      Ketones, UA Negative mg/dl      Urobilinogen, UA 0.2 E.U./dl      Bilirubin, UA Negative     Occult Blood, UA Negative    Comprehensive metabolic panel [[id number]] Collected: 03/13/24 1138    Lab Status: Final result Specimen: Blood from Arm, Left Updated: 03/13/24 1202     Sodium 139 mmol/L      Potassium 3.6 mmol/L      Chloride 103 mmol/L      CO2 26 mmol/L      ANION GAP 10 mmol/L      BUN 18 mg/dL      Creatinine 0.72 mg/dL      Glucose 93 mg/dL      Calcium 9.6 mg/dL      AST 21 U/L      ALT 23 U/L      Alkaline Phosphatase 54 U/L      Total Protein 7.0 g/dL      Albumin 4.4 g/dL      Total Bilirubin 0.40 mg/dL      eGFR 93 ml/min/1.73sq m     Narrative:      National Kid[employer] Foundation guidelines for Chronic Kidney Disease (CKD):     Stage 1 with normal or high GFR (GFR > 90 mL/min/1.73 square meters)    Stage 2 Mild CKD (GFR = 60-89 mL/min/1.73 square meters)    Stage 3A Moderate CKD (GFR = 45-59 mL/min/1.73 square meters)    Stage 3B Moderate CKD (GFR = 30-44 mL/min/1.73 square meters)    Stage 4 Severe CKD (GFR = 15-29 mL/min/1.73 square meters)    Stage 5 End Stage CKD (GFR <15 mL/min/1.73 square meters)  Note: GFR calculation is accurate only with a steady state creatinine    Lipase [[id number]]  (Normal) Collected: 03/13/24 1138    Lab Status: Final result Specimen: Blood from Arm, Left Updated: 03/13/24 1202     Lipase 50 u/L     CBC and differential [[id number]] Collected: 03/13/24  1138    Lab Status: Final result Specimen: Blood from Arm, Left Updated: 03/13/24 1143     WBC 5.29 Thousand/uL      RBC 4.33 Million/uL      Hemoglobin 13.3 g/dL      Hematocrit 41.0 %      MCV 95 fL      MCH 30.7 pg      MCHC 32.4 g/dL      RDW 12.3 %      MPV 10.7 fL      Platelets 225 Thousands/uL      nRBC 0 /100 WBCs      Neutrophils Relative 63 %      Immature Grans % 0 %      Lymphocytes Relative 25 %      Monocytes Relative 8 %      Eosinophils Relative 3 %      Basophils Relative 1 %      Neutrophils Absolute 3.30 Thousands/µL      Absolute Immature Grans 0.01 Thousand/uL      Absolute Lymphocytes 1.34 Thousands/µL      Absolute Monocytes 0.44 Thousand/µL      Eosinophils Absolute 0.15 Thousand/µL      Basophils Absolute 0.05 Thousands/µL                    US right upper quadrant   Final Result by Paresh Doty MD (03/13 1434)      Normal.      Workstation performed: [id number]         CT abdomen pelvis with contrast   Final Result by Colt Koch MD (03/13 1314)      No acute abdominopelvic abnormality.         Study initially reviewed and reported by Dr. Galvez.         Workstation performed: [id number]                    Procedures  ECG 12 Lead Documentation Only    Date/Time: 3/13/2024 8:28 PM    Performed by: MICHA Mcdaniels  Authorized by: MICHA Mcdaniels    Indications / Diagnosis:  Because a nurse did one  ECG reviewed by me, the ED Provider: yes    Patient location:  ED  Interpretation:     Interpretation: normal    Rate:     ECG rate:  76    ECG rate assessment: normal    Rhythm:     Rhythm: sinus rhythm    Ectopy:     Ectopy: none    QRS:     QRS axis:  Normal  Conduction:     Conduction: normal    ST segments:     ST segments:  Normal  T waves:     T waves: normal             ED Course                               SBIRT 22yo+      Flowsheet Row Most Recent Value   Initial Alcohol Screen: US AUDIT-C     1. How often do you have a drink containing alcohol? 0 Filed at:  03/13/2024 1130   2. How many drinks containing alcohol do you have on a typical day you are drinking?  0 Filed at: 03/13/2024 1130   3b. FEMALE Any Age, or MALE 65+: How often do you have 4 or more drinks on one occassion? 0 Filed at: 03/13/2024 1130   Audit-C Score 0 Filed at: 03/13/2024 1130   DYAN: How many times in the past year have you...    Used an illegal drug or used a prescription medication for non-medical reasons? Never Filed at: 03/13/2024 1130                      Medical Decision Making  DDx: Acute cholelithiasis, acute cholecystitis, ZACH, electrolyte abnormality, small bowel obstruction  Patient is a 58-year-old female who has been following up in the outpatient setting after her initial ER visit for abdominal pain.  Patient states that she is follow-up with general surgery, GI.  Patient was recently diagnosed with Crohn's, reports that she has had some improvement after starting Crohn's therapy.  Patient states that she has had continuous right upper quadrant pain, that is worsened with eating.  Patient denies any urinary symptoms.  Patient is otherwise well-appearing. Patient denies chest pain or shortness of breath.  Will obtain CT abdomen pelvis given Crohn's history.   Blood work today is reassuring and she has no leukocytosis, no anemia, no ZACH, no gross electrolyte abnormality.  Patient urine not convincing for UTI.  Patient has no urinary symptoms.  CT scan has no acute abnormalities. Discussed case with Dr. Ochoa and concern for acute cholecystis.  Recommendation for right upper quadrant ultrasound.  Made patient aware of this additional diagnostic.  Patient in agreement with this treatment plan.  AP from surgery came to evaluate the patient with recommendation for outpatient follow-up with GI.  Ultrasound within normal limits.  Patient reports that Toradol today improved pain. Patient stable to continue her outpatient work up.   Reviewed reasons to return to ed.  Patient verbalized  understanding of diagnosis and agreement with discharge plan of care as well as understanding of reasons to return to ed.        Amount and/or Complexity of Data Reviewed  Labs: ordered.  Radiology: ordered.    Risk  Prescription drug management.             Disposition  Final diagnoses:   Abdominal pain     Time reflects when diagnosis was documented in both MDM as applicable and the Disposition within this note       Time User Action Codes Description Comment    3/13/2024  4:31 PM Luiza Radha M Add [R10.9] Abdominal pain           ED Disposition       ED Disposition   Discharge    Condition   Stable    Date/Time   Wed Mar 13, 2024 1631    Comment   Luana Byers discharge to home/self care.                   Follow-up Information       Follow up With Specialties Details Why Contact Info Additional Information    [facility] Emergency Medicine Go to  If symptoms worsen [address] [address] [phone] [facility], [address], [place], [place] [address]    GI    Please keep follow up appointment.             Discharge Medication List as of 3/13/2024  4:32 PM        CONTINUE these medications which have NOT CHANGED    Details   amLODIPine (NORVASC) 2.5 mg tablet TAKE 1 TABLET BY MOUTH EVERY DAY, Starting Fri 1/12/2024, Normal      Multiple Vitamins-Minerals (MULTIVITAMIN WITH MINERALS) tablet Take 1 tablet by mouth daily, Historical Med      Omega-3 Fatty Acids (fish oil) 1,000 mg Take 2 g by mouth 2 (two) times a day, Historical Med      omeprazole (PriLOSEC) 20 mg delayed release capsule Starting Sat 2/11/2023, Historical Med      valsartan (DIOVAN) 160 mg tablet TAKE 1 TABLET BY MOUTH EVERY DAY, Starting Fri 1/19/2024, Normal      Vedolizumab (ENTYVIO IV) Historical Med             No discharge procedures on file.    PDMP Review         Value Time User    PDMP Reviewed  Yes 4/5/2022 11:16 AM  MICHA Narvaez            ED Provider  Electronically Signed by             MICHA Mcdaniels  03/17/24 9330

## 2024-03-14 LAB
ATRIAL RATE: 76 BPM
P AXIS: 67 DEGREES
PR INTERVAL: 154 MS
QRS AXIS: 8 DEGREES
QRSD INTERVAL: 84 MS
QT INTERVAL: 396 MS
QTC INTERVAL: 445 MS
T WAVE AXIS: 69 DEGREES
VENTRICULAR RATE: 76 BPM

## 2024-03-14 PROCEDURE — 93010 ELECTROCARDIOGRAM REPORT: CPT | Performed by: INTERNAL MEDICINE

## 2024-05-17 ENCOUNTER — APPOINTMENT (OUTPATIENT)
Dept: LAB | Facility: CLINIC | Age: 59
End: 2024-05-17
Payer: COMMERCIAL

## 2024-05-17 DIAGNOSIS — K50.819 CROHN'S DISEASE OF SMALL AND LARGE INTESTINES WITH COMPLICATION (HCC): ICD-10-CM

## 2024-05-17 LAB
ALBUMIN SERPL BCP-MCNC: 4.4 G/DL (ref 3.5–5)
ALP SERPL-CCNC: 52 U/L (ref 34–104)
ALT SERPL W P-5'-P-CCNC: 21 U/L (ref 7–52)
ANION GAP SERPL CALCULATED.3IONS-SCNC: 10 MMOL/L (ref 4–13)
AST SERPL W P-5'-P-CCNC: 18 U/L (ref 13–39)
BASOPHILS # BLD AUTO: 0.05 THOUSANDS/ÂΜL (ref 0–0.1)
BASOPHILS NFR BLD AUTO: 1 % (ref 0–1)
BILIRUB SERPL-MCNC: 0.36 MG/DL (ref 0.2–1)
BUN SERPL-MCNC: 13 MG/DL (ref 5–25)
CALCIUM SERPL-MCNC: 9.3 MG/DL (ref 8.4–10.2)
CHLORIDE SERPL-SCNC: 105 MMOL/L (ref 96–108)
CO2 SERPL-SCNC: 26 MMOL/L (ref 21–32)
CREAT SERPL-MCNC: 0.61 MG/DL (ref 0.6–1.3)
CRP SERPL QL: 3.2 MG/L
EOSINOPHIL # BLD AUTO: 0.17 THOUSAND/ÂΜL (ref 0–0.61)
EOSINOPHIL NFR BLD AUTO: 3 % (ref 0–6)
ERYTHROCYTE [DISTWIDTH] IN BLOOD BY AUTOMATED COUNT: 12.3 % (ref 11.6–15.1)
GFR SERPL CREATININE-BSD FRML MDRD: 101 ML/MIN/1.73SQ M
GLUCOSE SERPL-MCNC: 76 MG/DL (ref 65–140)
HCT VFR BLD AUTO: 42.1 % (ref 34.8–46.1)
HGB BLD-MCNC: 13.4 G/DL (ref 11.5–15.4)
IMM GRANULOCYTES # BLD AUTO: 0.01 THOUSAND/UL (ref 0–0.2)
IMM GRANULOCYTES NFR BLD AUTO: 0 % (ref 0–2)
LYMPHOCYTES # BLD AUTO: 1.48 THOUSANDS/ÂΜL (ref 0.6–4.47)
LYMPHOCYTES NFR BLD AUTO: 26 % (ref 14–44)
MCH RBC QN AUTO: 30.7 PG (ref 26.8–34.3)
MCHC RBC AUTO-ENTMCNC: 31.8 G/DL (ref 31.4–37.4)
MCV RBC AUTO: 96 FL (ref 82–98)
MONOCYTES # BLD AUTO: 0.44 THOUSAND/ÂΜL (ref 0.17–1.22)
MONOCYTES NFR BLD AUTO: 8 % (ref 4–12)
NEUTROPHILS # BLD AUTO: 3.6 THOUSANDS/ÂΜL (ref 1.85–7.62)
NEUTS SEG NFR BLD AUTO: 62 % (ref 43–75)
NRBC BLD AUTO-RTO: 0 /100 WBCS
PLATELET # BLD AUTO: 259 THOUSANDS/UL (ref 149–390)
PMV BLD AUTO: 11.8 FL (ref 8.9–12.7)
POTASSIUM SERPL-SCNC: 3.9 MMOL/L (ref 3.5–5.3)
PROT SERPL-MCNC: 6.7 G/DL (ref 6.4–8.4)
RBC # BLD AUTO: 4.37 MILLION/UL (ref 3.81–5.12)
SODIUM SERPL-SCNC: 141 MMOL/L (ref 135–147)
WBC # BLD AUTO: 5.75 THOUSAND/UL (ref 4.31–10.16)

## 2024-05-17 PROCEDURE — 80053 COMPREHEN METABOLIC PANEL: CPT

## 2024-05-17 PROCEDURE — 85025 COMPLETE CBC W/AUTO DIFF WBC: CPT

## 2024-05-17 PROCEDURE — 82397 CHEMILUMINESCENT ASSAY: CPT

## 2024-05-17 PROCEDURE — 36415 COLL VENOUS BLD VENIPUNCTURE: CPT

## 2024-05-17 PROCEDURE — 86140 C-REACTIVE PROTEIN: CPT

## 2024-05-17 PROCEDURE — 80280 DRUG ASSAY VEDOLIZUMAB: CPT

## 2024-05-21 ENCOUNTER — LAB (OUTPATIENT)
Dept: LAB | Facility: CLINIC | Age: 59
End: 2024-05-21
Payer: COMMERCIAL

## 2024-05-21 DIAGNOSIS — Z13.220 SCREENING FOR LIPID DISORDERS: ICD-10-CM

## 2024-05-21 DIAGNOSIS — Z13.0 SCREENING FOR DEFICIENCY ANEMIA: ICD-10-CM

## 2024-05-21 DIAGNOSIS — Z11.4 SCREENING FOR HIV (HUMAN IMMUNODEFICIENCY VIRUS): ICD-10-CM

## 2024-05-21 DIAGNOSIS — Z13.220 SCREENING FOR LIPOID DISORDERS: Primary | ICD-10-CM

## 2024-05-21 DIAGNOSIS — Z13.1 SCREENING FOR DIABETES MELLITUS (DM): ICD-10-CM

## 2024-05-21 DIAGNOSIS — Z13.29 SCREENING FOR THYROID DISORDER: ICD-10-CM

## 2024-05-21 DIAGNOSIS — K50.819 CROHN'S DISEASE OF SMALL AND LARGE INTESTINES WITH COMPLICATION (HCC): ICD-10-CM

## 2024-05-21 LAB
CHOLEST SERPL-MCNC: 195 MG/DL
HDLC SERPL-MCNC: 76 MG/DL
LDLC SERPL CALC-MCNC: 108 MG/DL (ref 0–100)
TRIGL SERPL-MCNC: 56 MG/DL
TSH SERPL DL<=0.05 MIU/L-ACNC: 0.75 UIU/ML (ref 0.45–4.5)

## 2024-05-21 PROCEDURE — 83993 ASSAY FOR CALPROTECTIN FECAL: CPT

## 2024-05-21 PROCEDURE — 87505 NFCT AGENT DETECTION GI: CPT

## 2024-05-21 PROCEDURE — 84443 ASSAY THYROID STIM HORMONE: CPT

## 2024-05-21 PROCEDURE — 36415 COLL VENOUS BLD VENIPUNCTURE: CPT

## 2024-05-21 PROCEDURE — 87493 C DIFF AMPLIFIED PROBE: CPT

## 2024-05-21 PROCEDURE — 80061 LIPID PANEL: CPT

## 2024-05-22 ENCOUNTER — TELEPHONE (OUTPATIENT)
Dept: INTERNAL MEDICINE CLINIC | Facility: CLINIC | Age: 59
End: 2024-05-22

## 2024-05-22 LAB
C COLI+JEJUNI TUF STL QL NAA+PROBE: NEGATIVE
C DIFF TOX GENS STL QL NAA+PROBE: NEGATIVE
EC STX1+STX2 GENES STL QL NAA+PROBE: NEGATIVE
SALMONELLA SP SPAO STL QL NAA+PROBE: NEGATIVE
SHIGELLA SP+EIEC IPAH STL QL NAA+PROBE: NEGATIVE

## 2024-05-22 NOTE — TELEPHONE ENCOUNTER
Left detailed msg for pt regarding normal results. Pt is established elsewhere now and we are no longer her pcp

## 2024-05-22 NOTE — TELEPHONE ENCOUNTER
----- Message from Adrien Ch DO sent at 5/22/2024  7:34 AM EDT -----  TSH noted to be in normal range no change in medication a this time

## 2024-05-28 LAB
CALPROTECTIN STL-MCNT: 73 UG/G (ref 0–120)
VEDOLIZUMAB AB SERPL IA-MCNC: <25 NG/ML
VEDOLIZUMAB SERPL IA-MCNC: 51 UG/ML

## 2024-06-04 ENCOUNTER — OFFICE VISIT (OUTPATIENT)
Dept: FAMILY MEDICINE CLINIC | Facility: CLINIC | Age: 59
End: 2024-06-04
Payer: COMMERCIAL

## 2024-06-04 VITALS
WEIGHT: 190.6 LBS | BODY MASS INDEX: 31.75 KG/M2 | TEMPERATURE: 97.7 F | SYSTOLIC BLOOD PRESSURE: 138 MMHG | HEART RATE: 70 BPM | OXYGEN SATURATION: 97 % | HEIGHT: 65 IN | DIASTOLIC BLOOD PRESSURE: 82 MMHG

## 2024-06-04 DIAGNOSIS — Z23 ENCOUNTER FOR IMMUNIZATION: ICD-10-CM

## 2024-06-04 DIAGNOSIS — R13.19 ESOPHAGEAL DYSPHAGIA: ICD-10-CM

## 2024-06-04 DIAGNOSIS — I10 PRIMARY HYPERTENSION: ICD-10-CM

## 2024-06-04 DIAGNOSIS — E78.2 MIXED HYPERLIPIDEMIA: ICD-10-CM

## 2024-06-04 DIAGNOSIS — Z00.00 ANNUAL PHYSICAL EXAM: Primary | ICD-10-CM

## 2024-06-04 DIAGNOSIS — Z13.31 DEPRESSION SCREENING NEGATIVE: ICD-10-CM

## 2024-06-04 PROCEDURE — 99396 PREV VISIT EST AGE 40-64: CPT | Performed by: NURSE PRACTITIONER

## 2024-06-04 RX ORDER — AMLODIPINE BESYLATE 2.5 MG/1
2.5 TABLET ORAL DAILY
Qty: 90 TABLET | Refills: 1 | Status: SHIPPED | OUTPATIENT
Start: 2024-06-04

## 2024-06-04 RX ORDER — VALSARTAN 160 MG/1
160 TABLET ORAL DAILY
Qty: 90 TABLET | Refills: 1 | Status: SHIPPED | OUTPATIENT
Start: 2024-06-04

## 2024-06-04 NOTE — PROGRESS NOTES
Adult Annual Physical  Name: Luana Byers      : 1965      MRN: [id number]  Encounter Provider: MICHA Edwards  Encounter Date: 2024   Encounter department: [facility]    Assessment & Plan   1. Annual physical exam  2. Encounter for immunization  3. Primary hypertension  -     amLODIPine (NORVASC) 2.5 mg tablet; Take 1 tablet (2.5 mg total) by mouth daily  -     valsartan (DIOVAN) 160 mg tablet; Take 1 tablet (160 mg total) by mouth daily  4. Mixed hyperlipidemia  5. Esophageal dysphagia  6. Depression screening negative    Immunizations and preventive care screenings were discussed with patient today. Appropriate education was printed on patient's after visit summary.    Counseling:  Alcohol/drug use: discussed moderation in alcohol intake, the recommendations for healthy alcohol use, and avoidance of illicit drug use.  Dental Health: discussed importance of regular tooth brushing, flossing, and dental visits.  Injury prevention: discussed safety/seat belts, safety helmets, smoke detectors, carbon dioxide detectors, and smoking near bedding or upholstery.  Sexual health: discussed sexually transmitted diseases, partner selection, use of condoms, avoidance of unintended pregnancy, and contraceptive alternatives.  Exercise: the importance of regular exercise/physical activity was discussed. Recommend exercise 3-5 times per week for at least 30 minutes.       Depression Screening and Follow-up Plan: Patient was screened for depression during today's encounter. They screened negative with a PHQ-2 score of 0.        History of Present Illness   {Disappearing Hyperlinks I Encounters * My Last Note * Since Last Visit * History :[id number]}  Adult Annual Physical:  Patient presents for annual physical.     Diet and Physical Activity:  - Diet/Nutrition: adequate fiber intake. can be better  - Exercise: no formal exercise.    Depression Screening:  - PHQ-2 Score: 0  - PHQ-9 Score:  0    General Health:  - Sleep: sleeps poorly. up every 2 hours - has difficult staying asleep  - Hearing: normal hearing bilateral ears.  - Vision: most recent eye exam < 1 year ago, wears contacts and wears glasses.  - Dental: no dental visits for > 1 year and brushes teeth twice daily.    /GYN Health:    - Menopause: postmenopausal.     Advanced Care Planning:  - Has an advanced directive?: no    - Has a durable medical POA?: no    - ACP document given to patient?: yes      Review of Systems   Constitutional:  Negative for fatigue and weight loss.   Gastrointestinal:  Negative for melena.   Musculoskeletal:  Negative for muscle weakness.   All other systems reviewed and are negative.    Pertinent Medical History   Hypertension  This is a chronic problem. The problem is controlled. There are no associated agents to hypertension. Risk factors for coronary artery disease include obesity and post-menopausal state. Past treatments include calcium channel blockers and angiotensin blockers. The current treatment provides moderate improvement. There are no compliance problems.  There is no history of angina, kidney disease or CAD/MI. There is no history of chronic renal disease.   Heartburn  This is a chronic problem. Nothing aggravates the symptoms. Pertinent negatives include no anemia, fatigue, melena, muscle weakness, orthopnea or weight loss. Risk factors include obesity. She has tried a PPI for the symptoms. The treatment provided moderate relief.           Medical History Reviewed by provider this encounter:  Tobacco  Allergies  Meds  Problems  Med Hx  Surg Hx  Fam Hx       Past Medical History   Past Medical History:   Diagnosis Date   • DVT of lower extremity (deep venous thrombosis) (HCC) 06/01/2016    was on elequis x 3 months   • GERD (gastroesophageal reflux disease) 2016    Endoscopy dilation 8/2/22   • Hyperlipidemia    • Hypertension 2017    Dizziness and hard to walk steady   • Inflammatory bowel  disease 2022    Colonoscopy 9/15/22..microscopic colitis   • Kidney stone     2mm stone found on left side..fluoroscopy 1/2/23   • Raynaud disease      Past Surgical History:   Procedure Laterality Date   • HYSTERECTOMY  1997   • VARICOSE VEIN SURGERY Right      Family History   Problem Relation Age of Onset   • Hypertension Mother    • Hypertension Father    • Hypertension Maternal Grandmother      Current Outpatient Medications on File Prior to Visit   Medication Sig Dispense Refill   • Multiple Vitamins-Minerals (MULTIVITAMIN WITH MINERALS) tablet Take 1 tablet by mouth daily     • Omega-3 Fatty Acids (fish oil) 1,000 mg Take 2 g by mouth 2 (two) times a day     • omeprazole (PriLOSEC) 20 mg delayed release capsule      • Vedolizumab (ENTYVIO IV)      • [DISCONTINUED] amLODIPine (NORVASC) 2.5 mg tablet TAKE 1 TABLET BY MOUTH EVERY DAY 90 tablet 1   • [DISCONTINUED] valsartan (DIOVAN) 160 mg tablet TAKE 1 TABLET BY MOUTH EVERY DAY 90 tablet 1     No current facility-administered medications on file prior to visit.     Allergies   Allergen Reactions   • Metronidazole Other (See Comments)     disoriented      Current Outpatient Medications on File Prior to Visit   Medication Sig Dispense Refill   • Multiple Vitamins-Minerals (MULTIVITAMIN WITH MINERALS) tablet Take 1 tablet by mouth daily     • Omega-3 Fatty Acids (fish oil) 1,000 mg Take 2 g by mouth 2 (two) times a day     • omeprazole (PriLOSEC) 20 mg delayed release capsule      • Vedolizumab (ENTYVIO IV)      • [DISCONTINUED] amLODIPine (NORVASC) 2.5 mg tablet TAKE 1 TABLET BY MOUTH EVERY DAY 90 tablet 1   • [DISCONTINUED] valsartan (DIOVAN) 160 mg tablet TAKE 1 TABLET BY MOUTH EVERY DAY 90 tablet 1     No current facility-administered medications on file prior to visit.      Social History     Tobacco Use   • Smoking status: Former     Current packs/day: 0.00     Average packs/day: 1 pack/day for 20.0 years (20.0 ttl pk-yrs)     Types: Cigarettes     Start  "date: 1984     Quit date: 2001     Years since quittin.4   • Smokeless tobacco: Never   Vaping Use   • Vaping status: Never Used   Substance and Sexual Activity   • Alcohol use: No   • Drug use: No   • Sexual activity: Yes     Partners: Male     Birth control/protection: Surgical       Objective   {Disappearing Hyperlinks   Review Vitals * Enter New Vitals * Results Review * Labs * Imaging * Cardiology * Procedures * Lung Cancer Screening :85809}  /82   Pulse 70   Temp 97.7 °F (36.5 °C) (Tympanic)   Ht 5' 5\" (1.651 m)   Wt 86.5 kg (190 lb 9.6 oz)   SpO2 97%   BMI 31.72 kg/m²     Physical Exam  Vitals and nursing note reviewed.   Constitutional:       Appearance: Normal appearance. She is well-developed.   HENT:      Head: Normocephalic and atraumatic.      Right Ear: Tympanic membrane, ear canal and external ear normal.      Left Ear: Tympanic membrane, ear canal and external ear normal.      Nose: Nose normal.      Mouth/Throat:      Mouth: Mucous membranes are moist.   Eyes:      General: Lids are normal. Vision grossly intact.      Conjunctiva/sclera: Conjunctivae normal.      Pupils: Pupils are equal, round, and reactive to light.   Cardiovascular:      Rate and Rhythm: Normal rate and regular rhythm.      Pulses: Normal pulses.      Heart sounds: Normal heart sounds, S1 normal and S2 normal.      No friction rub. No gallop. No S3 sounds.   Pulmonary:      Effort: Pulmonary effort is normal.      Breath sounds: Normal breath sounds.   Abdominal:      General: Bowel sounds are normal.      Palpations: Abdomen is soft.      Tenderness: There is no abdominal tenderness.   Genitourinary:     Comments: Deferred  Musculoskeletal:         General: Normal range of motion.      Cervical back: Normal range of motion and neck supple.      Right lower leg: No edema.      Left lower leg: No edema.   Lymphadenopathy:      Cervical: No cervical adenopathy.   Skin:     General: Skin is warm and dry.    "   Capillary Refill: Capillary refill takes less than 2 seconds.   Neurological:      General: No focal deficit present.      Mental Status: She is alert and oriented to person, place, and time.      Motor: Motor function is intact.      Gait: Gait is intact.   Psychiatric:         Attention and Perception: Attention and perception normal.         Mood and Affect: Mood and affect normal.         Speech: Speech normal.         Behavior: Behavior normal. Behavior is cooperative.         Thought Content: Thought content normal.         Cognition and Memory: Cognition and memory normal.         Judgment: Judgment normal.         Lab on 05/21/2024   Component Date Value Ref Range Status   • Salmonella sp PCR 05/21/2024 Negative  Negative Final        • Shigella sp/Enteroinvasive E. coli* 05/21/2024 Negative  Negative Final        • Campylobacter sp (jejuni and coli)* 05/21/2024 Negative  Negative Final        • Shiga toxin 1/Shiga toxin 2 genes * 05/21/2024 Negative  Negative Final        • Calprotectin 05/21/2024 73  0 - 120 ug/g Final    Concentration     Interpretation   Follow-Up  < 5 - 50 ug/g     Normal           None  >50 -120 ug/g     Borderline       Re-evaluate in 4-6 weeks      >120 ug/g     Abnormal         Repeat as clinically                                     indicated   • C.difficile toxin by PCR 05/21/2024 Negative  Negative Final        • TSH 3RD GENERATON 05/21/2024 0.749  0.450 - 4.500 uIU/mL Final    The recommended reference ranges for TSH during pregnancy are as follows:   First trimester 0.100 to 2.500 uIU/mL   Second trimester  0.200 to 3.000 uIU/mL   Third trimester 0.300 to 3.000 uIU/m    Note: Normal ranges may not apply to patients who are transgender, non-binary, or whose legal sex, sex at birth, and gender identity differ.  Adult TSH (3rd generation) reference range follows the recommended guidelines of the American Thyroid Association, January, 2020.   • Cholesterol 05/21/2024 195  See  Comment mg/dL Final    Cholesterol:         Pediatric <18 Years        Desirable          <170 mg/dL      Borderline High    170-199 mg/dL      High               >=200 mg/dL        Adult >=18 Years            Desirable         <200 mg/dL      Borderline High   200-239 mg/dL      High              >239 mg/dL     • Triglycerides 05/21/2024 56  See Comment mg/dL Final    Triglyceride:     0-9Y            <75mg/dL     10Y-17Y         <90 mg/dL       >=18Y     Normal          <150 mg/dL     Borderline High 150-199 mg/dL     High            200-499 mg/dL        Very High       >499 mg/dL    Specimen collection should occur prior to Metamizole administration due to the potential for falsely depressed results.   • HDL, Direct 05/21/2024 76  >=50 mg/dL Final   • LDL Calculated 05/21/2024 108 (H)  0 - 100 mg/dL Final    LDL Cholesterol:     Optimal           <100 mg/dl     Near Optimal      100-129 mg/dl     Above Optimal       Borderline High 130-159 mg/dl       High            160-189 mg/dl       Very High       >189 mg/dl         This screening LDL is a calculated result.   It does not have the accuracy of the Direct Measured LDL in the monitoring of patients with hyperlipidemia and/or statin therapy.   Direct Measure LDL ([id number]) must be ordered separately in these patients.   Appointment on 05/17/2024   Component Date Value Ref Range Status   • VEDOLIZUMAB 05/17/2024 51  ug/mL Final    Comment:   Quantitation Limit: <1.3 ug/mL  Results of 1.3 or higher indicate detection of vedolizumab.    COMMENTS:       - The optimal drug concentration depends upon patient-         specific factors including the disease and desired         therapeutic endpoint.       - The following vedolizumab trough concentration         targets have been proposed:             > 30.0 ug/mL at week 2 (1)             > 24.0 ug/mL at week 6 (1)             > 14 ug/mL during maintenance (1)         - Mucosal healing in UC was more common in patients          with higher week 6 trough levels (>30).(2)       - Highest quartile week 6 levels (>35.8) and lowest         quartile (<17.2) corresponded to week 52 remission         rates of 37% and 15%, respectively.(3)       - Patients with Crohn's Disease and Ulcerative Colitis         had similar vedolizumab pharmacokinetic data.(4)       - This assay measures the antibody-unbound (free)         fraction of vedolizumab when serum anti-vedolizumab         antibodies                            are present.     • ANTI-VEDOLIZUMAB ANTIBODY 05/17/2024 <25  ng/mL Final    Comment:   Quantitation Limit: < 25 ng/mL.  Results of 25 or higher indicate detection of anti-  vedolizumab antibodies.    COMMENTS:       - Anti-vedolizumab antibodies developed in about 13%         of IBD patients.(5)       - Patients with persistently positive anti-vedolizumab         antibodies had undetectable or reduced vedolizumab         levels.(5)       - Anti-drug antibody positivity should be interpreted         in the context of the concomitant free drug level.       - Serial measurements over time may be helpful.       - This anti-vedolizumab antibody assay is drug         tolerant, and all positive results are verified for         anti-drug antibody specificity by a confirmatory         test.    References:  1. Corby E, et al. Clin Gastroenterol Hepatol 2018;16:     1937-46.  2. Preethi M, et al. Inflamm Bowel Dis 2014;20:S1-S3.  3. Kristyn MT, et al. Aliment Pharmacol Ther 2019;49:     408-418.  4. Michael-Oscar KP, et al. [employer] 2015;29:57-67.  5. [employer]                            [employer], [place]: [place]     prescribing information. Accessed 21 Nov 2016.    These tests were developed and their performance  characteristics determined by [employer]. They have not been  cleared or approved by the Food and Drug Administration.    However, these electrochemiluminescence immunoassay (ECLIA)  measurements of vedolizumab and  anti-vedolizumab antibody  (constituting DoseASSURE VDZ) have been developed and  validated in accordance with FDA Guidance document, Assay  Development and Validation for Immunogenicity Testing of  Therapeutic Protein Products (2019).   • Sodium 05/17/2024 141  135 - 147 mmol/L Final   • Potassium 05/17/2024 3.9  3.5 - 5.3 mmol/L Final   • Chloride 05/17/2024 105  96 - 108 mmol/L Final   • CO2 05/17/2024 26  21 - 32 mmol/L Final   • ANION GAP 05/17/2024 10  4 - 13 mmol/L Final   • BUN 05/17/2024 13  5 - 25 mg/dL Final   • Creatinine 05/17/2024 0.61  0.60 - 1.30 mg/dL Final    Standardized to IDMS reference method   • Glucose 05/17/2024 76  65 - 140 mg/dL Final    If the patient is fasting, the ADA then defines impaired fasting glucose as > 100 mg/dL and diabetes as > or equal to 123 mg/dL.   • Calcium 05/17/2024 9.3  8.4 - 10.2 mg/dL Final   • AST 05/17/2024 18  13 - 39 U/L Final   • ALT 05/17/2024 21  7 - 52 U/L Final    Specimen collection should occur prior to Sulfasalazine administration due to the potential for falsely depressed results.    • Alkaline Phosphatase 05/17/2024 52  34 - 104 U/L Final   • Total Protein 05/17/2024 6.7  6.4 - 8.4 g/dL Final   • Albumin 05/17/2024 4.4  3.5 - 5.0 g/dL Final   • Total Bilirubin 05/17/2024 0.36  0.20 - 1.00 mg/dL Final    Use of this assay is not recommended for patients undergoing treatment with eltrombopag due to the potential for falsely elevated results.  N-acetyl-p-benzoquinone imine (metabolite of Acetaminophen) will generate erroneously low results in samples for patients that have taken an overdose of Acetaminophen.   • eGFR 05/17/2024 101  ml/min/1.73sq m Final   • CRP 05/17/2024 3.2 (H)  <3.0 mg/L Final   • WBC 05/17/2024 5.75  4.31 - 10.16 Thousand/uL Final   • RBC 05/17/2024 4.37  3.81 - 5.12 Million/uL Final   • Hemoglobin 05/17/2024 13.4  11.5 - 15.4 g/dL Final   • Hematocrit 05/17/2024 42.1  34.8 - 46.1 % Final   • MCV 05/17/2024 96  82 - 98 fL Final   •  MCH 05/17/2024 30.7  26.8 - 34.3 pg Final   • MCHC 05/17/2024 31.8  31.4 - 37.4 g/dL Final   • RDW 05/17/2024 12.3  11.6 - 15.1 % Final   • MPV 05/17/2024 11.8  8.9 - 12.7 fL Final   • Platelets 05/17/2024 259  149 - 390 Thousands/uL Final   • nRBC 05/17/2024 0  /100 WBCs Final   • Segmented % 05/17/2024 62  43 - 75 % Final   • Immature Grans % 05/17/2024 0  0 - 2 % Final   • Lymphocytes % 05/17/2024 26  14 - 44 % Final   • Monocytes % 05/17/2024 8  4 - 12 % Final   • Eosinophils Relative 05/17/2024 3  0 - 6 % Final   • Basophils Relative 05/17/2024 1  0 - 1 % Final   • Absolute Neutrophils 05/17/2024 3.60  1.85 - 7.62 Thousands/µL Final   • Absolute Immature Grans 05/17/2024 0.01  0.00 - 0.20 Thousand/uL Final   • Absolute Lymphocytes 05/17/2024 1.48  0.60 - 4.47 Thousands/µL Final   • Absolute Monocytes 05/17/2024 0.44  0.17 - 1.22 Thousand/µL Final   • Eosinophils Absolute 05/17/2024 0.17  0.00 - 0.61 Thousand/µL Final   • Basophils Absolute 05/17/2024 0.05  0.00 - 0.10 Thousands/µL Final     I have reviewed all the lab results. There are some abnormalities that are not critical to the patient's health, I have discussed these in person at this office appointment.    Administrative Statements {Disappearing Hyperlinks I  Level of Service * [facility]:[id number]}

## 2024-06-11 ENCOUNTER — HOSPITAL ENCOUNTER (OUTPATIENT)
Dept: MAMMOGRAPHY | Facility: HOSPITAL | Age: 59
Discharge: HOME | End: 2024-06-11
Payer: COMMERCIAL

## 2024-06-11 DIAGNOSIS — Z12.31 ENCOUNTER FOR SCREENING MAMMOGRAM FOR MALIGNANT NEOPLASM OF BREAST: ICD-10-CM

## 2024-06-11 PROCEDURE — 77063 BREAST TOMOSYNTHESIS BI: CPT

## 2024-06-11 PROCEDURE — 77067 SCR MAMMO BI INCL CAD: CPT

## 2024-06-12 NOTE — RESULT ENCOUNTER NOTE
Sent message to Pt via [employer] regarding her mammo result.    No mammographic evidence of malignancy.     Routine screening mammogram in 1 year

## 2024-11-08 ENCOUNTER — HOSPITAL ENCOUNTER (OUTPATIENT)
Dept: CT IMAGING | Facility: HOSPITAL | Age: 59
End: 2024-11-08
Payer: COMMERCIAL

## 2024-11-08 ENCOUNTER — HOSPITAL ENCOUNTER (OUTPATIENT)
Dept: RADIOLOGY | Facility: HOSPITAL | Age: 59
End: 2024-11-08
Payer: COMMERCIAL

## 2024-11-08 ENCOUNTER — APPOINTMENT (OUTPATIENT)
Dept: LAB | Facility: HOSPITAL | Age: 59
End: 2024-11-08
Payer: COMMERCIAL

## 2024-11-08 DIAGNOSIS — R10.816 EPIGASTRIC ABDOMINAL TENDERNESS, REBOUND TENDERNESS PRESENCE NOT SPECIFIED: ICD-10-CM

## 2024-11-08 DIAGNOSIS — R10.811 RIGHT UPPER QUADRANT ABDOMINAL TENDERNESS, REBOUND TENDERNESS PRESENCE NOT SPECIFIED: ICD-10-CM

## 2024-11-08 DIAGNOSIS — K59.00 CONSTIPATION, UNSPECIFIED: ICD-10-CM

## 2024-11-08 DIAGNOSIS — R10.811 RIGHT UPPER QUADRANT ABDOMINAL TENDERNESS: ICD-10-CM

## 2024-11-08 DIAGNOSIS — R14.0 ABDOMINAL DISTENSION (GASEOUS): ICD-10-CM

## 2024-11-08 DIAGNOSIS — R79.82 ELEVATED C-REACTIVE PROTEIN (CRP): ICD-10-CM

## 2024-11-08 DIAGNOSIS — R10.816 EPIGASTRIC ABDOMINAL TENDERNESS: ICD-10-CM

## 2024-11-08 DIAGNOSIS — K59.00 CONSTIPATION, UNSPECIFIED CONSTIPATION TYPE: ICD-10-CM

## 2024-11-08 DIAGNOSIS — K50.819 CROHN'S DISEASE OF SMALL AND LARGE INTESTINES WITH COMPLICATION (HCC): ICD-10-CM

## 2024-11-08 DIAGNOSIS — K50.80 CROHN'S DISEASE OF BOTH SMALL AND LARGE INTESTINE WITHOUT COMPLICATIONS (HCC): ICD-10-CM

## 2024-11-08 DIAGNOSIS — R14.0 ABDOMINAL DISTENSION: ICD-10-CM

## 2024-11-08 LAB
BASOPHILS # BLD AUTO: 0.03 THOUSANDS/ÂΜL (ref 0–0.1)
BASOPHILS NFR BLD AUTO: 1 % (ref 0–1)
BUN SERPL-MCNC: 14 MG/DL (ref 5–25)
CREAT SERPL-MCNC: 0.74 MG/DL (ref 0.6–1.3)
EOSINOPHIL # BLD AUTO: 0.08 THOUSAND/ÂΜL (ref 0–0.61)
EOSINOPHIL NFR BLD AUTO: 2 % (ref 0–6)
ERYTHROCYTE [DISTWIDTH] IN BLOOD BY AUTOMATED COUNT: 12.2 % (ref 11.6–15.1)
ERYTHROCYTE [SEDIMENTATION RATE] IN BLOOD: 6 MM/HOUR (ref 0–29)
GFR SERPL CREATININE-BSD FRML MDRD: 90 ML/MIN/1.73SQ M
HCT VFR BLD AUTO: 41.8 % (ref 34.8–46.1)
HGB BLD-MCNC: 13.4 G/DL (ref 11.5–15.4)
IMM GRANULOCYTES # BLD AUTO: 0.01 THOUSAND/UL (ref 0–0.2)
IMM GRANULOCYTES NFR BLD AUTO: 0 % (ref 0–2)
LYMPHOCYTES # BLD AUTO: 1.03 THOUSANDS/ÂΜL (ref 0.6–4.47)
LYMPHOCYTES NFR BLD AUTO: 23 % (ref 14–44)
MCH RBC QN AUTO: 30.2 PG (ref 26.8–34.3)
MCHC RBC AUTO-ENTMCNC: 32.1 G/DL (ref 31.4–37.4)
MCV RBC AUTO: 94 FL (ref 82–98)
MONOCYTES # BLD AUTO: 0.45 THOUSAND/ÂΜL (ref 0.17–1.22)
MONOCYTES NFR BLD AUTO: 10 % (ref 4–12)
NEUTROPHILS # BLD AUTO: 2.97 THOUSANDS/ÂΜL (ref 1.85–7.62)
NEUTS SEG NFR BLD AUTO: 64 % (ref 43–75)
NRBC BLD AUTO-RTO: 0 /100 WBCS
PLATELET # BLD AUTO: 219 THOUSANDS/UL (ref 149–390)
PMV BLD AUTO: 10.6 FL (ref 8.9–12.7)
RBC # BLD AUTO: 4.43 MILLION/UL (ref 3.81–5.12)
WBC # BLD AUTO: 4.57 THOUSAND/UL (ref 4.31–10.16)

## 2024-11-08 PROCEDURE — 36415 COLL VENOUS BLD VENIPUNCTURE: CPT

## 2024-11-08 PROCEDURE — 82565 ASSAY OF CREATININE: CPT

## 2024-11-08 PROCEDURE — 84520 ASSAY OF UREA NITROGEN: CPT

## 2024-11-08 PROCEDURE — 74177 CT ABD & PELVIS W/CONTRAST: CPT

## 2024-11-08 PROCEDURE — 74022 RADEX COMPL AQT ABD SERIES: CPT

## 2024-11-08 PROCEDURE — 85025 COMPLETE CBC W/AUTO DIFF WBC: CPT

## 2024-11-08 PROCEDURE — 85652 RBC SED RATE AUTOMATED: CPT

## 2024-11-08 RX ADMIN — IOHEXOL 50 ML: 240 INJECTION, SOLUTION INTRATHECAL; INTRAVASCULAR; INTRAVENOUS; ORAL at 11:45

## 2024-11-08 RX ADMIN — IOHEXOL 100 ML: 350 INJECTION, SOLUTION INTRAVENOUS at 13:33

## 2024-11-12 DIAGNOSIS — I10 PRIMARY HYPERTENSION: ICD-10-CM

## 2024-11-13 RX ORDER — VALSARTAN 160 MG/1
160 TABLET ORAL DAILY
Qty: 90 TABLET | Refills: 0 | Status: SHIPPED | OUTPATIENT
Start: 2024-11-13

## 2024-11-13 RX ORDER — AMLODIPINE BESYLATE 2.5 MG/1
2.5 TABLET ORAL DAILY
Qty: 90 TABLET | Refills: 0 | Status: SHIPPED | OUTPATIENT
Start: 2024-11-13

## 2024-11-14 ENCOUNTER — HOSPITAL ENCOUNTER (EMERGENCY)
Facility: HOSPITAL | Age: 59
Discharge: HOME | End: 2024-11-14
Attending: EMERGENCY MEDICINE
Payer: COMMERCIAL

## 2024-11-14 VITALS
SYSTOLIC BLOOD PRESSURE: 177 MMHG | OXYGEN SATURATION: 98 % | TEMPERATURE: 98.1 F | RESPIRATION RATE: 20 BRPM | HEART RATE: 78 BPM | WEIGHT: 190 LBS | HEIGHT: 65 IN | DIASTOLIC BLOOD PRESSURE: 82 MMHG | BODY MASS INDEX: 31.65 KG/M2

## 2024-11-14 DIAGNOSIS — K59.00 CONSTIPATION: Primary | ICD-10-CM

## 2024-11-14 DIAGNOSIS — Z87.19 HISTORY OF CROHN'S DISEASE: ICD-10-CM

## 2024-11-14 PROCEDURE — 99284 EMERGENCY DEPT VISIT MOD MDM: CPT | Performed by: EMERGENCY MEDICINE

## 2024-11-14 PROCEDURE — 99282 EMERGENCY DEPT VISIT SF MDM: CPT

## 2024-11-14 RX ADMIN — POLYETHYLENE GLYCOL 3350, SODIUM SULFATE ANHYDROUS, SODIUM BICARBONATE, SODIUM CHLORIDE, POTASSIUM CHLORIDE 4000 ML: 236; 22.74; 6.74; 5.86; 2.97 POWDER, FOR SOLUTION ORAL at 04:12

## 2024-11-14 NOTE — ED PROVIDER NOTES
Time reflects when diagnosis was documented in both MDM as applicable and the Disposition within this note       Time User Action Codes Description Comment    11/14/2024  4:02 AM Lawrence Barrios [K59.00] Constipation     11/14/2024  4:02 AM Lawrence Barrios [[email or web address]] History of Crohn's disease           ED Disposition       ED Disposition   Discharge    Condition   Stable    Date/Time   Thu Nov 14, 2024  4:02 AM    Comment   Luana Byers discharge to home/self care.                   Assessment & Plan       Medical Decision Making  59-year-old female with reported history of Crohn's.  Intermittent constipation and diarrhea.  Reviewed patient's recent CT scan, x-ray, blood work.  Did offer patient repeat CAT scan, blood work however patient states that as she is feeling similar to when those test were done just not improved she does not feel that she needs further testing and feels comfortable taking the GoLytely and going home at this time with strict return precautions.  Did stress to the patient the importance of staying well-hydrated.    Risk  Prescription drug management.             Medications   polyethylene glycol (GOLYTELY) bowel prep 4,000 mL (4,000 mL Oral Given 11/14/24 0412)       ED Risk Strat Scores                           SBIRT 20yo+      Flowsheet Row Most Recent Value   Initial Alcohol Screen: US AUDIT-C     1. How often do you have a drink containing alcohol? 0 Filed at: 11/14/2024 0340   2. How many drinks containing alcohol do you have on a typical day you are drinking?  0 Filed at: 11/14/2024 0340   3a. Male UNDER 65: How often do you have five or more drinks on one occasion? 0 Filed at: 11/14/2024 0340   3b. FEMALE Any Age, or MALE 65+: How often do you have 4 or more drinks on one occassion? 0 Filed at: 11/14/2024 0340   Audit-C Score 0 Filed at: 11/14/2024 0340   DYAN: How many times in the past year have you...    Used an illegal drug or used a prescription medication for  non-medical reasons? Never Filed at: 2024 0340                            History of Present Illness       Chief Complaint   Patient presents with    Constipation     Has Crohn's, saw her PCP twice. Has gone in one week. Pain and pressure is getting worse. Has tried magnesium citrate and enema. Still no relief.        Past Medical History:   Diagnosis Date    DVT of lower extremity (deep venous thrombosis) ([facility]) 2016    was on elequis x 3 months    GERD (gastroesophageal reflux disease)     Endoscopy dilation 22    Hyperlipidemia     Hypertension     Dizziness and hard to walk steady    Inflammatory bowel disease     Colonoscopy 9/15/22..microscopic colitis    Kidney stone     2mm stone found on left side..fluoroscopy 23    Raynaud disease       Past Surgical History:   Procedure Laterality Date    HYSTERECTOMY      VARICOSE VEIN SURGERY Right       Family History   Problem Relation Age of Onset    Ovarian cancer Mother         60s    Hypertension Mother     Hypertension Father     Hypertension Maternal Grandmother     No Known Problems Maternal Grandfather     No Known Problems Paternal Grandmother     No Known Problems Paternal Grandfather     No Known Problems Maternal Aunt     No Known Problems Paternal Aunt     No Known Problems Paternal Aunt       Social History     Tobacco Use    Smoking status: Former     Current packs/day: 0.00     Average packs/day: 1 pack/day for 20.0 years (20.0 ttl pk-yrs)     Types: Cigarettes     Start date: 1984     Quit date: 2001     Years since quittin.8    Smokeless tobacco: Never   Vaping Use    Vaping status: Never Used   Substance Use Topics    Alcohol use: No    Drug use: No      E-Cigarette/Vaping    E-Cigarette Use Never User       E-Cigarette/Vaping Substances    Nicotine No     THC No     CBD No     Flavoring No     Other No     Unknown No       I have reviewed and agree with the history as documented.     60 yo female with  hx of crohns' with intermittent diarrhea/constipation. Reports has not had a substantial Bm in about 8 days now. Has tried mag citrate and an enema about 2-3 days ago with only liquid but never had real relief. Consistently with R sided abdominal pain which is not worsening but is not improving otherwise.  Denies any other symptoms or issues at this time.       Constipation  Associated symptoms: abdominal pain        Review of Systems   Gastrointestinal:  Positive for abdominal pain and constipation.   All other systems reviewed and are negative.          Objective       ED Triage Vitals [11/14/24 0340]   Temperature Pulse Blood Pressure Respirations SpO2 Patient Position - Orthostatic VS   98.1 °F (36.7 °C) 78 (!) 177/82 20 98 % Sitting      Temp src Heart Rate Source BP Location FiO2 (%) Pain Score    -- Monitor Left arm -- 7      Vitals      Date and Time Temp Pulse SpO2 Resp BP Pain Score FACES Pain Rating User   11/14/24 0340 98.1 °F (36.7 °C) 78 98 % 20 177/82 7 -- KL            Physical Exam  Vitals and nursing note reviewed.   Constitutional:       General: She is not in acute distress.     Appearance: She is well-developed. She is not diaphoretic.   HENT:      Head: Normocephalic and atraumatic.      Right Ear: External ear normal.      Left Ear: External ear normal.      Nose: Nose normal.   Eyes:      General: No scleral icterus.        Right eye: No discharge.         Left eye: No discharge.      Conjunctiva/sclera: Conjunctivae normal.      Pupils: Pupils are equal, round, and reactive to light.   Neck:      Vascular: No JVD.      Trachea: No tracheal deviation.   Cardiovascular:      Rate and Rhythm: Normal rate and regular rhythm.      Heart sounds: Normal heart sounds. No murmur heard.     No friction rub. No gallop.   Pulmonary:      Effort: Pulmonary effort is normal. No respiratory distress.      Breath sounds: Normal breath sounds. No stridor. No wheezing or rales.   Abdominal:      General:  Bowel sounds are normal. There is no distension.      Palpations: Abdomen is soft. There is no mass.      Tenderness: There is no abdominal tenderness. There is no right CVA tenderness, left CVA tenderness or guarding.   Musculoskeletal:         General: No tenderness or deformity. Normal range of motion.      Cervical back: Normal range of motion and neck supple.   Skin:     General: Skin is warm and dry.      Coloration: Skin is not pale.      Findings: No erythema or rash.   Neurological:      Mental Status: She is alert and oriented to person, place, and time.      Cranial Nerves: No cranial nerve deficit.      Sensory: No sensory deficit.      Motor: No abnormal muscle tone.   Psychiatric:         Behavior: Behavior normal.         Thought Content: Thought content normal.         Judgment: Judgment normal.         Results Reviewed       None            No orders to display       Procedures    ED Medication and Procedure Management   Prior to Admission Medications   Prescriptions Last Dose Informant Patient Reported? Taking?   Multiple Vitamins-Minerals (MULTIVITAMIN WITH MINERALS) tablet  Self Yes No   Sig: Take 1 tablet by mouth daily   Omega-3 Fatty Acids (fish oil) 1,000 mg  Self Yes No   Sig: Take 2 g by mouth 2 (two) times a day   Vedolizumab (ENTYVIO IV)   Yes No   amLODIPine (NORVASC) 2.5 mg tablet   No No   Sig: Take 1 tablet by mouth daily.   omeprazole (PriLOSEC) 20 mg delayed release capsule  Self Yes No   valsartan (DIOVAN) 160 mg tablet   No No   Sig: Take 1 tablet by mouth daily.      Facility-Administered Medications: None     Discharge Medication List as of 11/14/2024  4:03 AM        CONTINUE these medications which have NOT CHANGED    Details   amLODIPine (NORVASC) 2.5 mg tablet Take 1 tablet by mouth daily., Starting Wed 11/13/2024, Normal      Multiple Vitamins-Minerals (MULTIVITAMIN WITH MINERALS) tablet Take 1 tablet by mouth daily, Historical Med      Omega-3 Fatty Acids (fish oil) 1,000  mg Take 2 g by mouth 2 (two) times a day, Historical Med      omeprazole (PriLOSEC) 20 mg delayed release capsule Starting Sat 2/11/2023, Historical Med      valsartan (DIOVAN) 160 mg tablet Take 1 tablet by mouth daily., Starting Wed 11/13/2024, Normal      Vedolizumab (ENTYVIO IV) Historical Med           No discharge procedures on file.  ED SEPSIS DOCUMENTATION   Time reflects when diagnosis was documented in both MDM as applicable and the Disposition within this note       Time User Action Codes Description Comment    11/14/2024  4:02 AM Lawrence Barrios [K59.00] Constipation     11/14/2024  4:02 AM Lawrence Barrios [Z87.19] History of Crohn's disease                  Lawrence Barrios, DO  11/14/24 0425

## 2024-11-27 DIAGNOSIS — I10 PRIMARY HYPERTENSION: ICD-10-CM

## 2024-11-27 RX ORDER — AMLODIPINE BESYLATE 2.5 MG/1
2.5 TABLET ORAL DAILY
Qty: 90 TABLET | Refills: 0 | Status: SHIPPED | OUTPATIENT
Start: 2024-11-27

## 2024-12-02 ENCOUNTER — HOSPITAL ENCOUNTER (OUTPATIENT)
Dept: RADIOLOGY | Facility: HOSPITAL | Age: 59
Discharge: HOME | End: 2024-12-02
Payer: COMMERCIAL

## 2024-12-02 DIAGNOSIS — K50.819 CROHN'S DISEASE OF SMALL AND LARGE INTESTINES WITH COMPLICATION (HCC): ICD-10-CM

## 2024-12-02 DIAGNOSIS — K59.00 CONSTIPATION, UNSPECIFIED CONSTIPATION TYPE: ICD-10-CM

## 2024-12-02 DIAGNOSIS — R10.9 STOMACH ACHE: ICD-10-CM

## 2024-12-02 DIAGNOSIS — R14.0 ABDOMINAL DISTENSION: ICD-10-CM

## 2024-12-02 PROCEDURE — 74018 RADEX ABDOMEN 1 VIEW: CPT

## 2024-12-04 ENCOUNTER — HOSPITAL ENCOUNTER (OUTPATIENT)
Dept: RADIOLOGY | Facility: HOSPITAL | Age: 59
Discharge: HOME | End: 2024-12-04
Payer: COMMERCIAL

## 2024-12-04 DIAGNOSIS — R14.0 ABDOMINAL DISTENSION, GASEOUS: ICD-10-CM

## 2024-12-04 DIAGNOSIS — K50.80 CROHN'S DISEASE OF BOTH SMALL AND LARGE INTESTINE WITHOUT COMPLICATION (HCC): ICD-10-CM

## 2024-12-04 DIAGNOSIS — R10.9 ABDOMINAL PAIN, UNSPECIFIED ABDOMINAL LOCATION: ICD-10-CM

## 2024-12-04 DIAGNOSIS — K59.00 CONSTIPATION, UNSPECIFIED CONSTIPATION TYPE: ICD-10-CM

## 2024-12-04 PROCEDURE — 74018 RADEX ABDOMEN 1 VIEW: CPT

## 2024-12-10 ENCOUNTER — OFFICE VISIT (OUTPATIENT)
Dept: FAMILY MEDICINE CLINIC | Facility: CLINIC | Age: 59
End: 2024-12-10
Payer: COMMERCIAL

## 2024-12-10 VITALS
TEMPERATURE: 97.8 F | HEART RATE: 65 BPM | WEIGHT: 189 LBS | BODY MASS INDEX: 31.49 KG/M2 | RESPIRATION RATE: 20 BRPM | DIASTOLIC BLOOD PRESSURE: 78 MMHG | SYSTOLIC BLOOD PRESSURE: 120 MMHG | HEIGHT: 65 IN | OXYGEN SATURATION: 100 %

## 2024-12-10 DIAGNOSIS — E66.09 CLASS 1 OBESITY DUE TO EXCESS CALORIES WITH SERIOUS COMORBIDITY AND BODY MASS INDEX (BMI) OF 31.0 TO 31.9 IN ADULT: Chronic | ICD-10-CM

## 2024-12-10 DIAGNOSIS — Z13.1 SCREENING FOR DIABETES MELLITUS: Chronic | ICD-10-CM

## 2024-12-10 DIAGNOSIS — E78.2 MIXED HYPERLIPIDEMIA: Chronic | ICD-10-CM

## 2024-12-10 DIAGNOSIS — K58.2 IRRITABLE BOWEL SYNDROME WITH BOTH CONSTIPATION AND DIARRHEA: Chronic | ICD-10-CM

## 2024-12-10 DIAGNOSIS — I10 PRIMARY HYPERTENSION: Primary | ICD-10-CM

## 2024-12-10 DIAGNOSIS — K52.9 COLITIS: ICD-10-CM

## 2024-12-10 DIAGNOSIS — E55.9 VITAMIN D DEFICIENCY: Chronic | ICD-10-CM

## 2024-12-10 DIAGNOSIS — I10 PRIMARY HYPERTENSION: ICD-10-CM

## 2024-12-10 DIAGNOSIS — Z13.820 OSTEOPOROSIS SCREENING: Chronic | ICD-10-CM

## 2024-12-10 DIAGNOSIS — Z11.4 SCREENING FOR HIV (HUMAN IMMUNODEFICIENCY VIRUS): ICD-10-CM

## 2024-12-10 DIAGNOSIS — E66.811 CLASS 1 OBESITY DUE TO EXCESS CALORIES WITH SERIOUS COMORBIDITY AND BODY MASS INDEX (BMI) OF 31.0 TO 31.9 IN ADULT: Chronic | ICD-10-CM

## 2024-12-10 DIAGNOSIS — Z23 ENCOUNTER FOR IMMUNIZATION: Primary | ICD-10-CM

## 2024-12-10 DIAGNOSIS — Z23 ENCOUNTER FOR IMMUNIZATION: ICD-10-CM

## 2024-12-10 PROBLEM — Z12.11 COLON CANCER SCREENING: Chronic | Status: ACTIVE | Noted: 2024-12-10

## 2024-12-10 PROBLEM — R00.2 PALPITATIONS: Status: RESOLVED | Noted: 2020-09-08 | Resolved: 2024-12-10

## 2024-12-10 PROBLEM — Z00.00 ANNUAL PHYSICAL EXAM: Status: ACTIVE | Noted: 2024-12-10

## 2024-12-10 PROBLEM — Z12.4 SCREENING FOR CERVICAL CANCER: Status: ACTIVE | Noted: 2024-12-10

## 2024-12-10 PROBLEM — R87.610 ASCUS OF CERVIX WITH NEGATIVE HIGH RISK HPV: Status: RESOLVED | Noted: 2022-07-29 | Resolved: 2024-12-10

## 2024-12-10 PROBLEM — Z12.4 SCREENING FOR CERVICAL CANCER: Chronic | Status: ACTIVE | Noted: 2024-12-10

## 2024-12-10 PROBLEM — N95.2 ATROPHIC VAGINITIS: Status: ACTIVE | Noted: 2022-07-29

## 2024-12-10 PROBLEM — Z12.31 BREAST CANCER SCREENING BY MAMMOGRAM: Status: ACTIVE | Noted: 2024-12-10

## 2024-12-10 PROBLEM — Z12.31 BREAST CANCER SCREENING BY MAMMOGRAM: Chronic | Status: ACTIVE | Noted: 2024-12-10

## 2024-12-10 PROBLEM — Z12.11 COLON CANCER SCREENING: Status: ACTIVE | Noted: 2024-12-10

## 2024-12-10 PROBLEM — Z00.00 ANNUAL PHYSICAL EXAM: Chronic | Status: ACTIVE | Noted: 2024-12-10

## 2024-12-10 PROBLEM — R87.610 ASCUS OF CERVIX WITH NEGATIVE HIGH RISK HPV: Status: ACTIVE | Noted: 2022-07-29

## 2024-12-10 PROBLEM — R10.10 UPPER ABDOMINAL PAIN OF UNKNOWN ETIOLOGY: Status: RESOLVED | Noted: 2023-09-11 | Resolved: 2024-12-10

## 2024-12-10 PROBLEM — D72.819 LEUKOPENIA: Status: RESOLVED | Noted: 2018-09-11 | Resolved: 2024-12-10

## 2024-12-10 PROCEDURE — 99215 OFFICE O/P EST HI 40 MIN: CPT | Performed by: NURSE PRACTITIONER

## 2024-12-10 PROCEDURE — 90471 IMMUNIZATION ADMIN: CPT

## 2024-12-10 PROCEDURE — 90673 RIV3 VACCINE NO PRESERV IM: CPT

## 2024-12-10 NOTE — ASSESSMENT & PLAN NOTE
This is a chronic disease process.   The patient is stable at this time.  We discussed diet and exercise.  Patient follows with GI  Continue  Remicade     Orders:  •  Comprehensive metabolic panel; Future  •  CBC and differential; Future  •  Comprehensive metabolic panel; Future  •  CBC and differential; Future

## 2024-12-10 NOTE — ASSESSMENT & PLAN NOTE
This is a chronic disease process.   The patient is stable at this time.  We discussed diet and exercise.  We discussed a low salt diet.  Will monitor labs.  Continue  Amlodipine  Valsartan  In the office: 144/72  Recheck in office: 120/78    Orders:  •  Comprehensive metabolic panel; Future  •  CBC and differential; Future  •  Comprehensive metabolic panel; Future  •  CBC and differential; Future

## 2024-12-10 NOTE — ASSESSMENT & PLAN NOTE
Lifestyle modification, diet and exercise discussed      OBESITY    For most healthy adults, the [place] Department of Health and Human Services recommends these exercise guidelines:  Aerobic activity.   Get at least 150 minutes of moderate aerobic activity or 75 minutes of vigorous aerobic activity a week, or a combination of moderate and vigorous activity.   The guidelines suggest that you spread out this exercise during the course of a week.   Greater amounts of exercise will provide even greater health benefit.   But even small amounts of physical activity are helpful.   Being active for short periods of time throughout the day can add up to provide health benefit.  Strength training.   Do strength training exercises for all major muscle groups at least two times a week.   Aim to do a single set of each exercise, using a weight or resistance level heavy enough to tire your muscles after about 12 to 15 repetitions.    Moderate aerobic exercise includes activities such as brisk walking, swimming and mowing the lawn.   Vigorous aerobic exercise includes activities such as running and aerobic dancing.   Strength training can include use of weight machines, your own body weight, resistance tubing or activities such as rock climbing.      Combined with healthy dietary choices and care with portion size, walking 10,000 steps per day will help you burn calories, which can result in weight loss.     Food tracker kacy like: [employer] (I am not affiliated with this or any other apps for diet or weight loss)    Exercise kacy like: [email or web address] (I am not affiliated with this or any other apps for diet or weight loss)    ______________________________________________________________________________    FDA guidelines recommend that weight-loss medicines may be considered for people who have tried lifestyle changes and meet one or more of the following conditions:   BMI equal or greater than 30.   BMI equal or greater than 27 with one  or more obesity-related conditions (like high blood pressure or diabetes).  Have not lost at least 5% of their total body weight in three to six months with lifestyle changes alone.    ______________________________________________________________________________    For even more health benefits, the guidelines suggest getting 300 minutes a week or more of moderate aerobic activity.   Exercising this much may help with weight loss or keeping off lost weight.   If you're trying to lose weight, you should aim for doing cardio at least five days per week for a total of at least 250 minutes (4 hours, 10 minutes) each week.   Contrary to what many believe, you can do aerobic exercise seven days per week.   If this goal seems daunting for you, start slow.     How do weight loss medications work?  There are many types of prescription weight-loss medicines that work differently to help with weight loss. Some are for short-term use (less than 12 weeks). Some can be used for longer periods of time. Many weight loss medications have side effects.  Prescription weight-loss medicines work in one or more of the following ways:  Decrease appetite  Increase feelings of fullness  Interfere with fat absorption    When should you start considering a weight loss medication?  FDA guidelines recommend that weight-loss medicines may be considered for people who have tried lifestyle changes and meet one or more of the following conditions:  BMI equal or greater than 30  BMI equal or greater than 27 with one or more obesity-related conditions (like high blood pressure or diabetes)  Have not lost at least 5% of their total body weight in three to six months with lifestyle changes alone  It's important for you to talk to your doctor about the risks and benefits of all weight-loss treatments. Weight-loss medications may not be the right treatment for everyone. Make sure your doctor knows your full medical history. Always talk to your doctor  before starting or stopping any medicine.    Any weight-loss medicine should be taken along with lifestyle changes, such as exercise and a healthy diet - not in place of them.

## 2024-12-10 NOTE — PATIENT INSTRUCTIONS
_________________________________________________________________  YOU ARE DUE FOR YOUR ANNUAL PHYSICAL EXAM AFTER 6/4/2025.  REPEAT LABS ORDERED, PLEASE HAVE THEM DRAWN THE WEEK PRIOR TO YOUR VISIT (APPROXIMATELY 5/28/2025).  LABS HAVE BEEN ORDERED FOR YOU.   THESE LABS SHOULD BE DRAWN PRIOR TO YOUR NEXT VISIT.  YOU CAN HAVE THEM DRAWN UP TO 1 WEEK PRIOR TO YOUR NEXT VISIT.  HAVING YOUR BLOOD WORK WHEN YOU COME TO YOUR NEXT VISIT WILL ALLOW ME TO PROVIDE THE BEST MEDICAL CARE FOR YOU WITHOUT HAVING EITHER OF US PERFORM MORE WORK THAN NECESSARY.  PLEASE BE COMPLIANT WITH THIS REQUEST.   _____________________________________________________________________

## 2024-12-10 NOTE — ASSESSMENT & PLAN NOTE
This is a chronic disease process.   The patient is stable at this time.  We discussed diet and exercise.  Was on atorvastatin in the past  Will monitor labs.  Not currently on medications     Orders:  •  Lipid panel; Future  •  Lipid panel; Future

## 2024-12-10 NOTE — ASSESSMENT & PLAN NOTE
Follows with GI   Will obtain records   Orders:  •  Comprehensive metabolic panel; Future  •  CBC and differential; Future  •  Comprehensive metabolic panel; Future  •  CBC and differential; Future

## 2024-12-10 NOTE — ASSESSMENT & PLAN NOTE
This is a chronic disease process.   The patient is stable at this time.  We discussed diet and exercise.  Continue  Omeprazole

## 2024-12-10 NOTE — PROGRESS NOTES
Name: Luana Byers      : 1965      MRN: [id number]  Encounter Provider: MICHA Youngblood  Encounter Date: 12/10/2024   Encounter department: [facility] CARE  :  Assessment & Plan  Screening for HIV (human immunodeficiency virus)    Orders:  •  HIV 1/2 AG/AB w Reflex [facility] for 2 yr old and above; Future    Encounter for immunization         Vitamin D deficiency    Orders:  •  Vitamin D 25 hydroxy; Future  •  Vitamin D 25 hydroxy; Future    Mixed hyperlipidemia  This is a chronic disease process.   The patient is stable at this time.  We discussed diet and exercise.  Was on atorvastatin in the past  Will monitor labs.  Not currently on medications     Orders:  •  Lipid panel; Future  •  Lipid panel; Future    Primary hypertension  This is a chronic disease process.   The patient is stable at this time.  We discussed diet and exercise.  We discussed a low salt diet.  Will monitor labs.  Continue  Amlodipine  Valsartan  In the office: 144/72  Recheck in office: 120/78    Orders:  •  Comprehensive metabolic panel; Future  •  CBC and differential; Future  •  Comprehensive metabolic panel; Future  •  CBC and differential; Future    Irritable bowel syndrome with both constipation and diarrhea  Follows with GI   Will obtain records   Orders:  •  Comprehensive metabolic panel; Future  •  CBC and differential; Future  •  Comprehensive metabolic panel; Future  •  CBC and differential; Future    Colitis  This is a chronic disease process.   The patient is stable at this time.  We discussed diet and exercise.  Patient follows with GI  Continue  Remicade     Orders:  •  Comprehensive metabolic panel; Future  •  CBC and differential; Future  •  Comprehensive metabolic panel; Future  •  CBC and differential; Future    Screening for diabetes mellitus    Orders:  •  Hemoglobin A1C; Future  •  Hemoglobin A1C; Future    Osteoporosis screening    Orders:  •  DXA bone density spine hip and pelvis;  Future    Class 1 obesity due to excess calories with serious comorbidity and body mass index (BMI) of 31.0 to 31.9 in adult  Lifestyle modification, diet and exercise discussed      OBESITY    For most healthy adults, the [place] Department of Health and Human Services recommends these exercise guidelines:  Aerobic activity.   Get at least 150 minutes of moderate aerobic activity or 75 minutes of vigorous aerobic activity a week, or a combination of moderate and vigorous activity.   The guidelines suggest that you spread out this exercise during the course of a week.   Greater amounts of exercise will provide even greater health benefit.   But even small amounts of physical activity are helpful.   Being active for short periods of time throughout the day can add up to provide health benefit.  Strength training.   Do strength training exercises for all major muscle groups at least two times a week.   Aim to do a single set of each exercise, using a weight or resistance level heavy enough to tire your muscles after about 12 to 15 repetitions.    Moderate aerobic exercise includes activities such as brisk walking, swimming and mowing the lawn.   Vigorous aerobic exercise includes activities such as running and aerobic dancing.   Strength training can include use of weight machines, your own body weight, resistance tubing or activities such as rock climbing.      Combined with healthy dietary choices and care with portion size, walking 10,000 steps per day will help you burn calories, which can result in weight loss.     Food tracker kacy like: MyFitnessPal (I am not affiliated with this or any other apps for diet or weight loss)    Exercise kacy like: [employer] (I am not affiliated with this or any other apps for diet or weight loss)    ______________________________________________________________________________    FDA guidelines recommend that weight-loss medicines may be considered for people who have tried lifestyle  changes and meet one or more of the following conditions:   BMI equal or greater than 30.   BMI equal or greater than 27 with one or more obesity-related conditions (like high blood pressure or diabetes).  Have not lost at least 5% of their total body weight in three to six months with lifestyle changes alone.    ______________________________________________________________________________    For even more health benefits, the guidelines suggest getting 300 minutes a week or more of moderate aerobic activity.   Exercising this much may help with weight loss or keeping off lost weight.   If you're trying to lose weight, you should aim for doing cardio at least five days per week for a total of at least 250 minutes (4 hours, 10 minutes) each week.   Contrary to what many believe, you can do aerobic exercise seven days per week.   If this goal seems daunting for you, start slow.     How do weight loss medications work?  There are many types of prescription weight-loss medicines that work differently to help with weight loss. Some are for short-term use (less than 12 weeks). Some can be used for longer periods of time. Many weight loss medications have side effects.  Prescription weight-loss medicines work in one or more of the following ways:  Decrease appetite  Increase feelings of fullness  Interfere with fat absorption    When should you start considering a weight loss medication?  FDA guidelines recommend that weight-loss medicines may be considered for people who have tried lifestyle changes and meet one or more of the following conditions:  BMI equal or greater than 30  BMI equal or greater than 27 with one or more obesity-related conditions (like high blood pressure or diabetes)  Have not lost at least 5% of their total body weight in three to six months with lifestyle changes alone  It's important for you to talk to your doctor about the risks and benefits of all weight-loss treatments. Weight-loss medications  may not be the right treatment for everyone. Make sure your doctor knows your full medical history. Always talk to your doctor before starting or stopping any medicine.    Any weight-loss medicine should be taken along with lifestyle changes, such as exercise and a healthy diet - not in place of them.              History of Present Illness     The patient is here today to establish care with this provider.   I reviewed their medical conditions, medications, laboratory and radiology studies.  I reviewed their scheduled future lab studies with the patient.   The patient's current treatment plan is effective.   There is no change in the current therapy.   I ask the patient to continue their current therapy.   The patient voiced their understanding and agreement.   Follow up after 6/4/2025 for her annual physical.  Please continue to the PORCH section of the note for details of today's visit.             Review of Systems   Constitutional:  Negative for activity change, chills, fatigue and fever.   HENT:  Negative for rhinorrhea and sore throat.    Eyes:  Negative for pain.   Respiratory:  Negative for cough and shortness of breath.    Cardiovascular:  Negative for chest pain, palpitations and leg swelling.   Gastrointestinal:  Negative for abdominal pain, constipation, diarrhea, nausea and vomiting.   Genitourinary:  Negative for difficulty urinating, flank pain, frequency and urgency.   Musculoskeletal:  Negative for gait problem, joint swelling and myalgias.   Skin:  Negative for color change.   Neurological:  Negative for dizziness, weakness, light-headedness and headaches.   Psychiatric/Behavioral:  Negative for sleep disturbance. The patient is not nervous/anxious.    All other systems reviewed and are negative.    Current Outpatient Medications on File Prior to Visit   Medication Sig Dispense Refill   • amLODIPine (NORVASC) 2.5 mg tablet Take 1 tablet (2.5 mg total) by mouth daily 90 tablet 0   • inFLIXimab  "(REMICADE IV)      • Multiple Vitamins-Minerals (MULTIVITAMIN WITH MINERALS) tablet Take 1 tablet by mouth daily     • Omega-3 Fatty Acids (fish oil) 1,000 mg Take 2 g by mouth 2 (two) times a day     • omeprazole (PriLOSEC) 20 mg delayed release capsule      • valsartan (DIOVAN) 160 mg tablet Take 1 tablet by mouth daily. 90 tablet 0   • [DISCONTINUED] Vedolizumab (ENTYVIO IV)        No current facility-administered medications on file prior to visit.         Objective   /78 Comment: left arm  Pulse 65   Temp 97.8 °F (36.6 °C) (Tympanic)   Resp 20   Ht 5' 5\" (1.651 m)   Wt 85.7 kg (189 lb)   SpO2 100%   BMI 31.45 kg/m²      Physical Exam  Vitals and nursing note reviewed.   Constitutional:       General: She is awake. She is not in acute distress.     Appearance: Normal appearance. She is well-developed.   HENT:      Head: Normocephalic and atraumatic.      Nose: Nose normal.      Mouth/Throat:      Mouth: Mucous membranes are moist.   Eyes:      Conjunctiva/sclera: Conjunctivae normal.   Cardiovascular:      Rate and Rhythm: Normal rate and regular rhythm.      Pulses: Normal pulses.      Heart sounds: Normal heart sounds. No murmur heard.  Pulmonary:      Effort: Pulmonary effort is normal. No respiratory distress.      Breath sounds: Normal breath sounds.   Abdominal:      General: Bowel sounds are normal.      Palpations: Abdomen is soft.      Tenderness: There is no abdominal tenderness.   Musculoskeletal:      Cervical back: Neck supple.      Right lower leg: No edema.      Left lower leg: No edema.   Skin:     General: Skin is warm and dry.   Neurological:      Mental Status: She is alert and oriented to person, place, and time.   Psychiatric:         Attention and Perception: Attention normal.         Mood and Affect: Mood normal.         Speech: Speech normal.         Behavior: Behavior normal. Behavior is cooperative.         Thought Content: Thought content normal.         Cognition and " Memory: Cognition normal.         Judgment: Judgment normal.       Administrative Statements   I have spent a total time of 45 minutes in caring for this patient on the day of the visit/encounter including Diagnostic results, Prognosis, Risks and benefits of tx options, Instructions for management, Patient and family education, Importance of tx compliance, Risk factor reductions, Impressions, Counseling / Coordination of care, Documenting in the medical record, Reviewing / ordering tests, medicine, procedures  , and Obtaining or reviewing history  . Topics discussed with the patient / family include symptom assessment and management and medication review.

## 2024-12-11 RX ORDER — VALSARTAN 160 MG/1
160 TABLET ORAL DAILY
Qty: 90 TABLET | Refills: 1 | Status: SHIPPED | OUTPATIENT
Start: 2024-12-11

## 2025-01-09 PROBLEM — Z11.4 SCREENING FOR HIV (HUMAN IMMUNODEFICIENCY VIRUS): Status: RESOLVED | Noted: 2024-12-10 | Resolved: 2025-01-09

## 2025-02-07 ENCOUNTER — OFFICE VISIT (OUTPATIENT)
Dept: FAMILY MEDICINE CLINIC | Facility: CLINIC | Age: 60
End: 2025-02-07
Payer: COMMERCIAL

## 2025-02-07 VITALS
HEART RATE: 72 BPM | WEIGHT: 177 LBS | HEIGHT: 65 IN | RESPIRATION RATE: 18 BRPM | OXYGEN SATURATION: 100 % | BODY MASS INDEX: 29.49 KG/M2 | DIASTOLIC BLOOD PRESSURE: 98 MMHG | TEMPERATURE: 96.5 F | SYSTOLIC BLOOD PRESSURE: 148 MMHG

## 2025-02-07 DIAGNOSIS — K50.80 CROHN'S DISEASE OF BOTH SMALL AND LARGE INTESTINE WITHOUT COMPLICATION (HCC): ICD-10-CM

## 2025-02-07 DIAGNOSIS — I10 PRIMARY HYPERTENSION: Chronic | ICD-10-CM

## 2025-02-07 DIAGNOSIS — T69.1XXS CHILBLAINS, SEQUELA: Primary | Chronic | ICD-10-CM

## 2025-02-07 PROBLEM — T69.1XXA CHILBLAINS: Chronic | Status: ACTIVE | Noted: 2025-02-07

## 2025-02-07 PROBLEM — T69.1XXA CHILBLAINS: Status: ACTIVE | Noted: 2025-02-07

## 2025-02-07 PROCEDURE — 99213 OFFICE O/P EST LOW 20 MIN: CPT | Performed by: NURSE PRACTITIONER

## 2025-02-07 NOTE — ASSESSMENT & PLAN NOTE
Use small amount of nitroglycerin ointment to the tip of her toes to see if it helps with increased blood flow and decreasing the pain.   Orders:  •  nitroglycerin (NITRO-BID) 2 % ointment; Apply 1 inch topically daily

## 2025-02-07 NOTE — PROGRESS NOTES
Name: Luana Byers      : 1965      MRN: [id number]  Encounter Provider: MICHA Youngblood  Encounter Date: 2025   Encounter department: [facility]  :  Assessment & Plan  Chilblains, sequela  Use small amount of nitroglycerin ointment to the tip of her toes to see if it helps with increased blood flow and decreasing the pain.   Orders:  •  nitroglycerin (NITRO-BID) 2 % ointment; Apply 1 inch topically daily    Crohn's disease of both small and large intestine without complication (HCC)         Primary hypertension                History of Present Illness   The patient is here today to discuss her painful toes and fingers.   I reviewed their medical conditions, medications, laboratory and radiology studies.  I reviewed their scheduled future lab studies with the patient.   The patient's current treatment plan is effective.   There is no change in the current therapy.   I ask the patient to continue their current therapy.   The patient voiced their understanding and agreement.   Follow up as scheduled .  Please continue to the PORCH section of the note for details of today's visit.             Review of Systems   Constitutional:  Negative for activity change, chills, fatigue and fever.   HENT:  Negative for rhinorrhea and sore throat.    Eyes:  Negative for pain.   Respiratory:  Negative for cough and shortness of breath.    Cardiovascular:  Negative for chest pain, palpitations and leg swelling.   Gastrointestinal:  Negative for abdominal pain, constipation, diarrhea, nausea and vomiting.   Genitourinary:  Negative for difficulty urinating, flank pain, frequency and urgency.   Musculoskeletal:  Positive for arthralgias and myalgias. Negative for gait problem and joint swelling.   Skin:  Negative for color change.   Neurological:  Negative for dizziness, weakness, light-headedness and headaches.   Psychiatric/Behavioral:  Negative for sleep disturbance. The patient is  "not nervous/anxious.    All other systems reviewed and are negative.      Objective   /98 (Patient Position: Sitting, Cuff Size: Standard)   Pulse 72   Temp (!) 96.5 °F (35.8 °C) (Tympanic)   Resp 18   Ht 5' 5\" (1.651 m)   Wt 80.3 kg (177 lb)   SpO2 100%   BMI 29.45 kg/m²      Physical Exam  Vitals and nursing note reviewed.   Constitutional:       General: She is awake. She is not in acute distress.     Appearance: Normal appearance. She is well-developed.   HENT:      Head: Normocephalic and atraumatic.      Nose: Nose normal.      Mouth/Throat:      Mouth: Mucous membranes are moist.   Eyes:      Conjunctiva/sclera: Conjunctivae normal.   Cardiovascular:      Rate and Rhythm: Normal rate and regular rhythm.      Pulses: Normal pulses.      Heart sounds: Normal heart sounds. No murmur heard.  Pulmonary:      Effort: Pulmonary effort is normal. No respiratory distress.      Breath sounds: Normal breath sounds.   Abdominal:      General: Bowel sounds are normal.      Palpations: Abdomen is soft.      Tenderness: There is no abdominal tenderness.   Musculoskeletal:      Cervical back: Neck supple.      Right lower leg: No edema.      Left lower leg: No edema.        Feet:    Skin:     General: Skin is warm and dry.   Neurological:      Mental Status: She is alert and oriented to person, place, and time.   Psychiatric:         Attention and Perception: Attention normal.         Mood and Affect: Mood normal.         Speech: Speech normal.         Behavior: Behavior normal. Behavior is cooperative.         Thought Content: Thought content normal.         Cognition and Memory: Cognition normal.         Judgment: Judgment normal.       Administrative Statements   I have spent a total time of 25 minutes in caring for this patient on the day of the visit/encounter including Diagnostic results, Prognosis, Risks and benefits of tx options, Instructions for management, Patient and family education, Importance of " tx compliance, Risk factor reductions, Impressions, Counseling / Coordination of care, Documenting in the medical record, Reviewing / ordering tests, medicine, procedures  , and Obtaining or reviewing history  . Topics discussed with the patient / family include symptom assessment and management, medication review, medication adjustment, psychosocial support, and supportive listening.

## 2025-02-13 DIAGNOSIS — I10 PRIMARY HYPERTENSION: ICD-10-CM

## 2025-02-13 RX ORDER — AMLODIPINE BESYLATE 2.5 MG/1
2.5 TABLET ORAL DAILY
Qty: 90 TABLET | Refills: 1 | Status: SHIPPED | OUTPATIENT
Start: 2025-02-13

## 2025-05-05 DIAGNOSIS — I10 PRIMARY HYPERTENSION: ICD-10-CM

## 2025-05-06 RX ORDER — VALSARTAN 160 MG/1
160 TABLET ORAL DAILY
Qty: 90 TABLET | Refills: 0 | Status: SHIPPED | OUTPATIENT
Start: 2025-05-06

## 2025-06-21 DIAGNOSIS — I10 PRIMARY HYPERTENSION: ICD-10-CM

## 2025-06-22 RX ORDER — AMLODIPINE BESYLATE 2.5 MG/1
2.5 TABLET ORAL DAILY
Qty: 90 TABLET | Refills: 1 | Status: SHIPPED | OUTPATIENT
Start: 2025-06-22

## 2025-06-22 RX ORDER — VALSARTAN 160 MG/1
160 TABLET ORAL DAILY
Qty: 90 TABLET | Refills: 1 | Status: SHIPPED | OUTPATIENT
Start: 2025-06-22